# Patient Record
Sex: MALE | Race: BLACK OR AFRICAN AMERICAN | NOT HISPANIC OR LATINO | URBAN - METROPOLITAN AREA
[De-identification: names, ages, dates, MRNs, and addresses within clinical notes are randomized per-mention and may not be internally consistent; named-entity substitution may affect disease eponyms.]

---

## 2023-01-01 ENCOUNTER — APPOINTMENT (OUTPATIENT)
Dept: GENERAL RADIOLOGY | Facility: HOSPITAL | Age: 53
DRG: 917 | End: 2023-01-01
Payer: MEDICAID

## 2023-01-01 ENCOUNTER — APPOINTMENT (OUTPATIENT)
Dept: CARDIOLOGY | Facility: HOSPITAL | Age: 53
DRG: 917 | End: 2023-01-01
Payer: MEDICAID

## 2023-01-01 ENCOUNTER — APPOINTMENT (OUTPATIENT)
Dept: NUCLEAR MEDICINE | Facility: HOSPITAL | Age: 53
DRG: 917 | End: 2023-01-01
Payer: MEDICAID

## 2023-01-01 ENCOUNTER — APPOINTMENT (OUTPATIENT)
Dept: MRI IMAGING | Facility: HOSPITAL | Age: 53
DRG: 917 | End: 2023-01-01
Payer: MEDICAID

## 2023-01-01 ENCOUNTER — APPOINTMENT (OUTPATIENT)
Dept: ULTRASOUND IMAGING | Facility: HOSPITAL | Age: 53
DRG: 917 | End: 2023-01-01
Payer: MEDICAID

## 2023-01-01 ENCOUNTER — ANESTHESIA EVENT (OUTPATIENT)
Dept: PERIOP | Facility: HOSPITAL | Age: 53
DRG: 917 | End: 2023-01-01
Payer: MEDICAID

## 2023-01-01 ENCOUNTER — APPOINTMENT (OUTPATIENT)
Dept: CT IMAGING | Facility: HOSPITAL | Age: 53
DRG: 917 | End: 2023-01-01
Payer: MEDICAID

## 2023-01-01 ENCOUNTER — APPOINTMENT (OUTPATIENT)
Dept: INTERVENTIONAL RADIOLOGY/VASCULAR | Facility: HOSPITAL | Age: 53
DRG: 917 | End: 2023-01-01
Payer: MEDICAID

## 2023-01-01 ENCOUNTER — HOSPITAL ENCOUNTER (INPATIENT)
Facility: HOSPITAL | Age: 53
DRG: 917 | End: 2023-01-01
Attending: EMERGENCY MEDICINE | Admitting: INTERNAL MEDICINE
Payer: MEDICAID

## 2023-01-01 ENCOUNTER — ANESTHESIA (OUTPATIENT)
Dept: PERIOP | Facility: HOSPITAL | Age: 53
DRG: 917 | End: 2023-01-01
Payer: MEDICAID

## 2023-01-01 DIAGNOSIS — G93.1 ANOXIC BRAIN INJURY: ICD-10-CM

## 2023-01-01 DIAGNOSIS — Z86.74 HISTORY OF SUDDEN CARDIAC ARREST SUCCESSFULLY RESUSCITATED: Primary | ICD-10-CM

## 2023-01-01 DIAGNOSIS — I46.9 CARDIAC ARREST WITH SUCCESSFUL RESUSCITATION: ICD-10-CM

## 2023-01-01 DIAGNOSIS — T40.411A ACCIDENTAL FENTANYL OVERDOSE, INITIAL ENCOUNTER: ICD-10-CM

## 2023-01-01 LAB
ABO GROUP BLD: NORMAL
ABO GROUP BLD: NORMAL
ACETONE SERPL-MCNC: <.01 G/DL (ref 0–0.01)
ALBUMIN SERPL-MCNC: 1.9 G/DL (ref 3.5–5.2)
ALBUMIN SERPL-MCNC: 2.3 G/DL (ref 3.5–5.2)
ALBUMIN SERPL-MCNC: 2.4 G/DL (ref 3.5–5.2)
ALBUMIN SERPL-MCNC: 2.5 G/DL (ref 3.5–5.2)
ALBUMIN SERPL-MCNC: 2.5 G/DL (ref 3.5–5.2)
ALBUMIN SERPL-MCNC: 2.6 G/DL (ref 3.5–5.2)
ALBUMIN SERPL-MCNC: 2.6 G/DL (ref 3.5–5.2)
ALBUMIN SERPL-MCNC: 2.8 G/DL (ref 3.5–5.2)
ALBUMIN SERPL-MCNC: 2.8 G/DL (ref 3.5–5.2)
ALBUMIN SERPL-MCNC: 2.9 G/DL (ref 3.5–5.2)
ALBUMIN SERPL-MCNC: 2.9 G/DL (ref 3.5–5.2)
ALBUMIN SERPL-MCNC: 3.1 G/DL (ref 3.5–5.2)
ALBUMIN SERPL-MCNC: 3.5 G/DL (ref 3.5–5.2)
ALBUMIN SERPL-MCNC: 3.9 G/DL (ref 3.5–5.2)
ALBUMIN/GLOB SERPL: 0.7 G/DL
ALBUMIN/GLOB SERPL: 0.9 G/DL
ALBUMIN/GLOB SERPL: 1 G/DL
ALBUMIN/GLOB SERPL: 1 G/DL
ALBUMIN/GLOB SERPL: 1.1 G/DL
ALBUMIN/GLOB SERPL: 1.2 G/DL
ALBUMIN/GLOB SERPL: 1.2 G/DL
ALBUMIN/GLOB SERPL: 1.6 G/DL
ALBUMIN/GLOB SERPL: 1.8 G/DL
ALP SERPL-CCNC: 100 U/L (ref 39–117)
ALP SERPL-CCNC: 108 U/L (ref 39–117)
ALP SERPL-CCNC: 116 U/L (ref 39–117)
ALP SERPL-CCNC: 154 U/L (ref 39–117)
ALP SERPL-CCNC: 43 U/L (ref 39–117)
ALP SERPL-CCNC: 45 U/L (ref 39–117)
ALP SERPL-CCNC: 65 U/L (ref 39–117)
ALP SERPL-CCNC: 68 U/L (ref 39–117)
ALP SERPL-CCNC: 79 U/L (ref 39–117)
ALT SERPL W P-5'-P-CCNC: 115 U/L (ref 1–41)
ALT SERPL W P-5'-P-CCNC: 163 U/L (ref 1–41)
ALT SERPL W P-5'-P-CCNC: 198 U/L (ref 1–41)
ALT SERPL W P-5'-P-CCNC: 290 U/L (ref 1–41)
ALT SERPL W P-5'-P-CCNC: 385 U/L (ref 1–41)
ALT SERPL W P-5'-P-CCNC: 511 U/L (ref 1–41)
ALT SERPL W P-5'-P-CCNC: 72 U/L (ref 1–41)
ALT SERPL W P-5'-P-CCNC: 89 U/L (ref 1–41)
ALT SERPL W P-5'-P-CCNC: 93 U/L (ref 1–41)
AMPHET+METHAMPHET UR QL: NEGATIVE
ANION GAP SERPL CALCULATED.3IONS-SCNC: 10 MMOL/L (ref 5–15)
ANION GAP SERPL CALCULATED.3IONS-SCNC: 12 MMOL/L (ref 5–15)
ANION GAP SERPL CALCULATED.3IONS-SCNC: 14 MMOL/L (ref 5–15)
ANION GAP SERPL CALCULATED.3IONS-SCNC: 16 MMOL/L (ref 5–15)
ANION GAP SERPL CALCULATED.3IONS-SCNC: 16 MMOL/L (ref 5–15)
ANION GAP SERPL CALCULATED.3IONS-SCNC: 17 MMOL/L (ref 5–15)
ANION GAP SERPL CALCULATED.3IONS-SCNC: 19 MMOL/L (ref 10–20)
ANION GAP SERPL CALCULATED.3IONS-SCNC: 19 MMOL/L (ref 5–15)
ANION GAP SERPL CALCULATED.3IONS-SCNC: 8 MMOL/L (ref 5–15)
ANION GAP SERPL CALCULATED.3IONS-SCNC: 8 MMOL/L (ref 5–15)
ANION GAP SERPL CALCULATED.3IONS-SCNC: 9 MMOL/L (ref 5–15)
APAP SERPL-MCNC: <5 MCG/ML (ref 0–30)
APTT PPP: 25.6 SECONDS (ref 24–31)
APTT PPP: 39.3 SECONDS (ref 24–31)
ARTERIAL PATENCY WRIST A: ABNORMAL
ARTERIAL PATENCY WRIST A: POSITIVE
ARTERIAL PATENCY WRIST A: POSITIVE
AST SERPL-CCNC: 106 U/L (ref 1–40)
AST SERPL-CCNC: 1287 U/L (ref 1–40)
AST SERPL-CCNC: 154 U/L (ref 1–40)
AST SERPL-CCNC: 223 U/L (ref 1–40)
AST SERPL-CCNC: 380 U/L (ref 1–40)
AST SERPL-CCNC: 433 U/L (ref 1–40)
AST SERPL-CCNC: 62 U/L (ref 1–40)
AST SERPL-CCNC: 632 U/L (ref 1–40)
AST SERPL-CCNC: 85 U/L (ref 1–40)
ATMOSPHERIC PRESS: ABNORMAL MM[HG]
B PARAPERT DNA SPEC QL NAA+PROBE: NOT DETECTED
B PERT DNA SPEC QL NAA+PROBE: NOT DETECTED
BACTERIA BLD CULT: ABNORMAL
BACTERIA SPEC AEROBE CULT: ABNORMAL
BACTERIA SPEC AEROBE CULT: NORMAL
BACTERIA SPEC RESP CULT: NORMAL
BACTERIA UR QL AUTO: ABNORMAL /HPF
BARBITURATES UR QL SCN: NEGATIVE
BASE EXCESS BLDA CALC-SCNC: -10.7 MMOL/L (ref 0–3)
BASE EXCESS BLDA CALC-SCNC: -14.4 MMOL/L (ref 0–3)
BASE EXCESS BLDA CALC-SCNC: -15.4 MMOL/L (ref 0–3)
BASE EXCESS BLDA CALC-SCNC: -16.9 MMOL/L (ref 0–3)
BASE EXCESS BLDA CALC-SCNC: -17.8 MMOL/L (ref 0–3)
BASE EXCESS BLDA CALC-SCNC: -18.8 MMOL/L (ref 0–3)
BASE EXCESS BLDA CALC-SCNC: -3.9 MMOL/L (ref 0–3)
BASE EXCESS BLDA CALC-SCNC: -6.6 MMOL/L (ref 0–3)
BASE EXCESS BLDA CALC-SCNC: 0.3 MMOL/L (ref 0–3)
BASOPHILS # BLD AUTO: 0 10*3/MM3 (ref 0–0.2)
BASOPHILS # BLD MANUAL: 0.08 10*3/MM3 (ref 0–0.2)
BASOPHILS NFR BLD AUTO: 0.5 % (ref 0–1.5)
BASOPHILS NFR BLD MANUAL: 1 % (ref 0–1.5)
BDY SITE: ABNORMAL
BENZODIAZ UR QL SCN: NEGATIVE
BH BB BLOOD EXPIRATION DATE: NORMAL
BH BB BLOOD TYPE BARCODE: 6200
BH BB BLOOD TYPE BARCODE: 6200
BH BB BLOOD TYPE BARCODE: NORMAL
BH BB DISPENSE STATUS: NORMAL
BH BB PRODUCT CODE: NORMAL
BH BB UNIT NUMBER: NORMAL
BH CV ECHO MEAS - ACS: 1.7 CM
BH CV ECHO MEAS - AO MAX PG: 4.2 MMHG
BH CV ECHO MEAS - AO MEAN PG: 2 MMHG
BH CV ECHO MEAS - AO ROOT DIAM: 3.4 CM
BH CV ECHO MEAS - AO V2 MAX: 102 CM/SEC
BH CV ECHO MEAS - AO V2 VTI: 11.3 CM
BH CV ECHO MEAS - AVA(I,D): 2.7 CM2
BH CV ECHO MEAS - EDV(CUBED): 64 ML
BH CV ECHO MEAS - EDV(MOD-SP2): 90.1 ML
BH CV ECHO MEAS - EDV(MOD-SP4): 62.1 ML
BH CV ECHO MEAS - EDV(MOD-SP4): 83.7 ML
BH CV ECHO MEAS - EF(MOD-BP): 23 %
BH CV ECHO MEAS - EF(MOD-BP): 49.9 %
BH CV ECHO MEAS - EF(MOD-SP2): 51.2 %
BH CV ECHO MEAS - EF(MOD-SP4): 22.5 %
BH CV ECHO MEAS - EF(MOD-SP4): 51.4 %
BH CV ECHO MEAS - ESV(MOD-SP2): 44 ML
BH CV ECHO MEAS - ESV(MOD-SP4): 40.7 ML
BH CV ECHO MEAS - ESV(MOD-SP4): 48.1 ML
BH CV ECHO MEAS - FS: 15 %
BH CV ECHO MEAS - IVS/LVPW: 0.88 CM
BH CV ECHO MEAS - IVSD: 0.7 CM
BH CV ECHO MEAS - LA DIMENSION: 1.7 CM
BH CV ECHO MEAS - LAT PEAK E' VEL: 8.9 CM/SEC
BH CV ECHO MEAS - LV DIASTOLIC VOL/BSA (35-75): 32.6 CM2
BH CV ECHO MEAS - LV MASS(C)D: 85.8 GRAMS
BH CV ECHO MEAS - LV MAX PG: 1.89 MMHG
BH CV ECHO MEAS - LV MEAN PG: 1 MMHG
BH CV ECHO MEAS - LV SYSTOLIC VOL/BSA (12-30): 25.3 CM2
BH CV ECHO MEAS - LV V1 MAX: 68.7 CM/SEC
BH CV ECHO MEAS - LV V1 VTI: 9.6 CM
BH CV ECHO MEAS - LVIDD: 4 CM
BH CV ECHO MEAS - LVIDS: 3.4 CM
BH CV ECHO MEAS - LVOT AREA: 3.1 CM2
BH CV ECHO MEAS - LVOT DIAM: 2 CM
BH CV ECHO MEAS - LVPWD: 0.8 CM
BH CV ECHO MEAS - MED PEAK E' VEL: 6.7 CM/SEC
BH CV ECHO MEAS - MR MAX PG: 7 MMHG
BH CV ECHO MEAS - MR MAX VEL: 132 CM/SEC
BH CV ECHO MEAS - MV A DUR: 0.1 SEC
BH CV ECHO MEAS - MV A MAX VEL: 39.3 CM/SEC
BH CV ECHO MEAS - MV DEC SLOPE: 475 CM/SEC2
BH CV ECHO MEAS - MV DEC TIME: 0.09 SEC
BH CV ECHO MEAS - MV E MAX VEL: 44.5 CM/SEC
BH CV ECHO MEAS - MV E/A: 1.13
BH CV ECHO MEAS - MV MAX PG: 1.07 MMHG
BH CV ECHO MEAS - MV MEAN PG: 0 MMHG
BH CV ECHO MEAS - MV P1/2T: 24.7 MSEC
BH CV ECHO MEAS - MV V2 VTI: 8.9 CM
BH CV ECHO MEAS - MVA(P1/2T): 8.9 CM2
BH CV ECHO MEAS - MVA(VTI): 3.4 CM2
BH CV ECHO MEAS - PA V2 MAX: 72.1 CM/SEC
BH CV ECHO MEAS - PULM A REVS DUR: 0.09 SEC
BH CV ECHO MEAS - PULM A REVS VEL: 27 CM/SEC
BH CV ECHO MEAS - PULM DIAS VEL: 27.9 CM/SEC
BH CV ECHO MEAS - PULM S/D: 1.1
BH CV ECHO MEAS - PULM SYS VEL: 30.7 CM/SEC
BH CV ECHO MEAS - RAP SYSTOLE: 3 MMHG
BH CV ECHO MEAS - RVSP: 20 MMHG
BH CV ECHO MEAS - SI(MOD-SP4): 7.4 ML/M2
BH CV ECHO MEAS - SV(LVOT): 30 ML
BH CV ECHO MEAS - SV(MOD-SP2): 46.1 ML
BH CV ECHO MEAS - SV(MOD-SP4): 14 ML
BH CV ECHO MEAS - SV(MOD-SP4): 43 ML
BH CV ECHO MEAS - TAPSE (>1.6): 1.19 CM
BH CV ECHO MEAS - TR MAX PG: 17 MMHG
BH CV ECHO MEAS - TR MAX VEL: 206 CM/SEC
BH CV ECHO MEASUREMENTS AVERAGE E/E' RATIO: 5.71
BH CV UPPER ARTERIAL LEFT WBI RATIO: 1.66
BH CV UPPER ARTERIAL RIGHT WBI RATIO: 1.61
BH CV UPPER DUPLEX RIGHT AXILLARY ARTERY PSV: 47 CM/S
BH CV UPPER DUPLEX RIGHT BRACHIAL ART PSV: 78 CM/S
BH CV UPPER DUPLEX RIGHT RADIAL ART PSV: 0 CM/S
BH CV UPPER DUPLEX RIGHT SUBCLAVIAN ART PSV: 40 CM/S
BH CV UPPER DUPLEX RIGHT ULNAR ART PSV: 55 CM/S
BH CV VAS PRELIMINARY FINDINGS SCRIPTING: 1
BH CV XLRA - RV BASE: 2.3 CM
BH CV XLRA - RV LENGTH: 5.7 CM
BH CV XLRA - RV MID: 1.9 CM
BH CV XLRA - TDI S': 8.8 CM/SEC
BILIRUB SERPL-MCNC: 0.2 MG/DL (ref 0–1.2)
BILIRUB SERPL-MCNC: 0.4 MG/DL (ref 0–1.2)
BILIRUB SERPL-MCNC: 0.5 MG/DL (ref 0–1.2)
BILIRUB SERPL-MCNC: 0.6 MG/DL (ref 0–1.2)
BILIRUB SERPL-MCNC: 0.6 MG/DL (ref 0–1.2)
BILIRUB SERPL-MCNC: 0.7 MG/DL (ref 0–1.2)
BILIRUB SERPL-MCNC: 1 MG/DL (ref 0–1.2)
BILIRUB UR QL STRIP: NEGATIVE
BLD GP AB SCN SERPL QL: NEGATIVE
BOTTLE TYPE: ABNORMAL
BUN BLDA-MCNC: 11 MG/DL (ref 8–26)
BUN SERPL-MCNC: 11 MG/DL (ref 6–20)
BUN SERPL-MCNC: 13 MG/DL (ref 6–20)
BUN SERPL-MCNC: 14 MG/DL (ref 6–20)
BUN SERPL-MCNC: 14 MG/DL (ref 6–20)
BUN SERPL-MCNC: 17 MG/DL (ref 6–20)
BUN SERPL-MCNC: 18 MG/DL (ref 6–20)
BUN SERPL-MCNC: 18 MG/DL (ref 6–20)
BUN SERPL-MCNC: 20 MG/DL (ref 6–20)
BUN SERPL-MCNC: 24 MG/DL (ref 6–20)
BUN SERPL-MCNC: 26 MG/DL (ref 6–20)
BUN SERPL-MCNC: 27 MG/DL (ref 6–20)
BUN SERPL-MCNC: 31 MG/DL (ref 6–20)
BUN SERPL-MCNC: 34 MG/DL (ref 6–20)
BUN SERPL-MCNC: 34 MG/DL (ref 6–20)
BUN SERPL-MCNC: 37 MG/DL (ref 6–20)
BUN SERPL-MCNC: 40 MG/DL (ref 6–20)
BUN SERPL-MCNC: 50 MG/DL (ref 6–20)
BUN/CREAT SERPL: 6.3 (ref 7–25)
BUN/CREAT SERPL: 6.5 (ref 7–25)
BUN/CREAT SERPL: 6.5 (ref 7–25)
BUN/CREAT SERPL: 7.2 (ref 7–25)
BUN/CREAT SERPL: 7.2 (ref 7–25)
BUN/CREAT SERPL: 7.3 (ref 7–25)
BUN/CREAT SERPL: 7.4 (ref 7–25)
BUN/CREAT SERPL: 7.4 (ref 7–25)
BUN/CREAT SERPL: 7.5 (ref 7–25)
BUN/CREAT SERPL: 7.9 (ref 7–25)
BUN/CREAT SERPL: 8.1 (ref 7–25)
BUN/CREAT SERPL: 8.3 (ref 7–25)
BUN/CREAT SERPL: 8.4 (ref 7–25)
BUN/CREAT SERPL: 8.9 (ref 7–25)
BUN/CREAT SERPL: 9.2 (ref 7–25)
C DIFF GDH + TOXINS A+B STL QL IA.RAPID: NEGATIVE
C DIFF GDH + TOXINS A+B STL QL IA.RAPID: NEGATIVE
C PNEUM DNA NPH QL NAA+NON-PROBE: NOT DETECTED
CA-I BLDA-SCNC: 1.06 MMOL/L (ref 1.15–1.33)
CA-I BLDA-SCNC: 1.14 MMOL/L (ref 1.12–1.32)
CA-I BLDA-SCNC: 1.18 MMOL/L (ref 1.15–1.33)
CA-I SERPL ISE-MCNC: 0.94 MMOL/L (ref 1.2–1.3)
CA-I SERPL ISE-MCNC: 0.94 MMOL/L (ref 1.2–1.3)
CA-I SERPL ISE-MCNC: 1.03 MMOL/L (ref 1.2–1.3)
CA-I SERPL ISE-MCNC: 1.11 MMOL/L (ref 1.2–1.3)
CA-I SERPL ISE-MCNC: 1.12 MMOL/L (ref 1.2–1.3)
CA-I SERPL ISE-MCNC: 1.15 MMOL/L (ref 1.2–1.3)
CA-I SERPL ISE-MCNC: 1.17 MMOL/L (ref 1.2–1.3)
CA-I SERPL ISE-MCNC: 1.17 MMOL/L (ref 1.2–1.3)
CA-I SERPL ISE-MCNC: 1.18 MMOL/L (ref 1.2–1.3)
CA-I SERPL ISE-MCNC: 1.18 MMOL/L (ref 1.2–1.3)
CA-I SERPL ISE-MCNC: 1.25 MMOL/L (ref 1.2–1.3)
CALCIUM SPEC-SCNC: 6.9 MG/DL (ref 8.6–10.5)
CALCIUM SPEC-SCNC: 7.2 MG/DL (ref 8.6–10.5)
CALCIUM SPEC-SCNC: 7.3 MG/DL (ref 8.6–10.5)
CALCIUM SPEC-SCNC: 7.7 MG/DL (ref 8.6–10.5)
CALCIUM SPEC-SCNC: 7.9 MG/DL (ref 8.6–10.5)
CALCIUM SPEC-SCNC: 8.2 MG/DL (ref 8.6–10.5)
CALCIUM SPEC-SCNC: 8.3 MG/DL (ref 8.6–10.5)
CALCIUM SPEC-SCNC: 8.4 MG/DL (ref 8.6–10.5)
CALCIUM SPEC-SCNC: 8.5 MG/DL (ref 8.6–10.5)
CALCIUM SPEC-SCNC: 8.7 MG/DL (ref 8.6–10.5)
CALCIUM SPEC-SCNC: 9 MG/DL (ref 8.6–10.5)
CALCIUM SPEC-SCNC: 9.1 MG/DL (ref 8.6–10.5)
CALCIUM SPEC-SCNC: 9.3 MG/DL (ref 8.6–10.5)
CANNABINOIDS SERPL QL: NEGATIVE
CHLORIDE BLDA-SCNC: 101 MMOL/L (ref 98–109)
CHLORIDE SERPL-SCNC: 101 MMOL/L (ref 98–107)
CHLORIDE SERPL-SCNC: 102 MMOL/L (ref 98–107)
CHLORIDE SERPL-SCNC: 103 MMOL/L (ref 98–107)
CHLORIDE SERPL-SCNC: 103 MMOL/L (ref 98–107)
CHLORIDE SERPL-SCNC: 104 MMOL/L (ref 98–107)
CHLORIDE SERPL-SCNC: 105 MMOL/L (ref 98–107)
CHLORIDE SERPL-SCNC: 106 MMOL/L (ref 98–107)
CHLORIDE SERPL-SCNC: 106 MMOL/L (ref 98–107)
CHLORIDE SERPL-SCNC: 107 MMOL/L (ref 98–107)
CHLORIDE SERPL-SCNC: 109 MMOL/L (ref 98–107)
CHLORIDE SERPL-SCNC: 112 MMOL/L (ref 98–107)
CHLORIDE SERPL-SCNC: 113 MMOL/L (ref 98–107)
CHLORIDE SERPL-SCNC: 114 MMOL/L (ref 98–107)
CHOLEST SERPL-MCNC: 192 MG/DL (ref 0–200)
CK SERPL-CCNC: 2950 U/L (ref 20–200)
CLARITY UR: CLEAR
CO2 BLDA-SCNC: 16.8 MMOL/L (ref 22–29)
CO2 BLDA-SCNC: 18.4 MMOL/L (ref 22–29)
CO2 BLDA-SCNC: 19.3 MMOL/L (ref 22–29)
CO2 BLDA-SCNC: 21 MMOL/L (ref 22–29)
CO2 BLDA-SCNC: 21.3 MMOL/L (ref 22–29)
CO2 BLDA-SCNC: 21.7 MMOL/L (ref 22–29)
CO2 BLDA-SCNC: 25 MMOL/L (ref 24–29)
CO2 BLDA-SCNC: 25.6 MMOL/L (ref 22–29)
CO2 SERPL-SCNC: 16 MMOL/L (ref 22–29)
CO2 SERPL-SCNC: 17 MMOL/L (ref 22–29)
CO2 SERPL-SCNC: 20 MMOL/L (ref 22–29)
CO2 SERPL-SCNC: 21 MMOL/L (ref 22–29)
CO2 SERPL-SCNC: 22 MMOL/L (ref 22–29)
CO2 SERPL-SCNC: 23 MMOL/L (ref 22–29)
CO2 SERPL-SCNC: 24 MMOL/L (ref 22–29)
CO2 SERPL-SCNC: 25 MMOL/L (ref 22–29)
CO2 SERPL-SCNC: 25 MMOL/L (ref 22–29)
CO2 SERPL-SCNC: 26 MMOL/L (ref 22–29)
CO2 SERPL-SCNC: 27 MMOL/L (ref 22–29)
COCAINE UR QL: NEGATIVE
COLOR UR: YELLOW
CREAT BLDA-MCNC: 1.07 MG/DL
CREAT BLDA-MCNC: 1.35 MG/DL
CREAT BLDA-MCNC: 1.6 MG/DL (ref 0.6–1.3)
CREAT SERPL-MCNC: 1.33 MG/DL (ref 0.76–1.27)
CREAT SERPL-MCNC: 1.77 MG/DL (ref 0.76–1.27)
CREAT SERPL-MCNC: 1.94 MG/DL (ref 0.76–1.27)
CREAT SERPL-MCNC: 2.15 MG/DL (ref 0.76–1.27)
CREAT SERPL-MCNC: 2.28 MG/DL (ref 0.76–1.27)
CREAT SERPL-MCNC: 2.41 MG/DL (ref 0.76–1.27)
CREAT SERPL-MCNC: 2.7 MG/DL (ref 0.76–1.27)
CREAT SERPL-MCNC: 2.71 MG/DL (ref 0.76–1.27)
CREAT SERPL-MCNC: 3.03 MG/DL (ref 0.76–1.27)
CREAT SERPL-MCNC: 3.23 MG/DL (ref 0.76–1.27)
CREAT SERPL-MCNC: 3.61 MG/DL (ref 0.76–1.27)
CREAT SERPL-MCNC: 3.68 MG/DL (ref 0.76–1.27)
CREAT SERPL-MCNC: 4.41 MG/DL (ref 0.76–1.27)
CREAT SERPL-MCNC: 4.9 MG/DL (ref 0.76–1.27)
CREAT SERPL-MCNC: 5.39 MG/DL (ref 0.76–1.27)
CREAT SERPL-MCNC: 6.17 MG/DL (ref 0.76–1.27)
CREAT SERPL-MCNC: 6.19 MG/DL (ref 0.76–1.27)
D DIMER PPP FEU-MCNC: 35.1 MG/L (FEU) (ref 0–0.53)
D-LACTATE SERPL-SCNC: 1.8 MMOL/L (ref 0.5–2)
D-LACTATE SERPL-SCNC: 6.7 MMOL/L (ref 0.5–2)
D-LACTATE SERPL-SCNC: 6.9 MMOL/L (ref 0.2–2)
D-LACTATE SERPL-SCNC: 7.1 MMOL/L (ref 0.2–2)
D-LACTATE SERPL-SCNC: 7.7 MMOL/L (ref 0.2–2)
D-LACTATE SERPL-SCNC: 7.7 MMOL/L (ref 0.5–2)
DEPRECATED RDW RBC AUTO: 42.9 FL (ref 37–54)
DEPRECATED RDW RBC AUTO: 43.8 FL (ref 37–54)
DEPRECATED RDW RBC AUTO: 44.6 FL (ref 37–54)
DEPRECATED RDW RBC AUTO: 45.1 FL (ref 37–54)
DEPRECATED RDW RBC AUTO: 45.5 FL (ref 37–54)
DEPRECATED RDW RBC AUTO: 45.9 FL (ref 37–54)
DEPRECATED RDW RBC AUTO: 46.4 FL (ref 37–54)
DEPRECATED RDW RBC AUTO: 46.8 FL (ref 37–54)
DEPRECATED RDW RBC AUTO: 47.3 FL (ref 37–54)
DOHLE BOD BLD QL SMEAR: PRESENT
EGFRCR SERPLBLD CKD-EPI 2021: 10.1 ML/MIN/1.73
EGFRCR SERPLBLD CKD-EPI 2021: 10.1 ML/MIN/1.73
EGFRCR SERPLBLD CKD-EPI 2021: 11.9 ML/MIN/1.73
EGFRCR SERPLBLD CKD-EPI 2021: 13.4 ML/MIN/1.73
EGFRCR SERPLBLD CKD-EPI 2021: 15.2 ML/MIN/1.73
EGFRCR SERPLBLD CKD-EPI 2021: 18.8 ML/MIN/1.73
EGFRCR SERPLBLD CKD-EPI 2021: 19.3 ML/MIN/1.73
EGFRCR SERPLBLD CKD-EPI 2021: 22 ML/MIN/1.73
EGFRCR SERPLBLD CKD-EPI 2021: 23.8 ML/MIN/1.73
EGFRCR SERPLBLD CKD-EPI 2021: 27.2 ML/MIN/1.73
EGFRCR SERPLBLD CKD-EPI 2021: 27.3 ML/MIN/1.73
EGFRCR SERPLBLD CKD-EPI 2021: 31.3 ML/MIN/1.73
EGFRCR SERPLBLD CKD-EPI 2021: 33.5 ML/MIN/1.73
EGFRCR SERPLBLD CKD-EPI 2021: 35.9 ML/MIN/1.73
EGFRCR SERPLBLD CKD-EPI 2021: 40.6 ML/MIN/1.73
EGFRCR SERPLBLD CKD-EPI 2021: 45.4 ML/MIN/1.73
EGFRCR SERPLBLD CKD-EPI 2021: 51.2 ML/MIN/1.73
EGFRCR SERPLBLD CKD-EPI 2021: 62.8 ML/MIN/1.73
EGFRCR SERPLBLD CKD-EPI 2021: 63.9 ML/MIN/1.73
EGFRCR SERPLBLD CKD-EPI 2021: 83 ML/MIN/1.73
EOSINOPHIL # BLD AUTO: 0 10*3/MM3 (ref 0–0.4)
EOSINOPHIL # BLD MANUAL: 0.06 10*3/MM3 (ref 0–0.4)
EOSINOPHIL # BLD MANUAL: 0.3 10*3/MM3 (ref 0–0.4)
EOSINOPHIL # BLD MANUAL: 0.47 10*3/MM3 (ref 0–0.4)
EOSINOPHIL # BLD MANUAL: 0.55 10*3/MM3 (ref 0–0.4)
EOSINOPHIL NFR BLD AUTO: 0.5 % (ref 0.3–6.2)
EOSINOPHIL NFR BLD MANUAL: 1 % (ref 0.3–6.2)
EOSINOPHIL NFR BLD MANUAL: 2 % (ref 0.3–6.2)
EOSINOPHIL NFR BLD MANUAL: 3 % (ref 0.3–6.2)
EOSINOPHIL NFR BLD MANUAL: 4 % (ref 0.3–6.2)
ERYTHROCYTE [DISTWIDTH] IN BLOOD BY AUTOMATED COUNT: 12.9 % (ref 12.3–15.4)
ERYTHROCYTE [DISTWIDTH] IN BLOOD BY AUTOMATED COUNT: 13 % (ref 12.3–15.4)
ERYTHROCYTE [DISTWIDTH] IN BLOOD BY AUTOMATED COUNT: 13.1 % (ref 12.3–15.4)
ERYTHROCYTE [DISTWIDTH] IN BLOOD BY AUTOMATED COUNT: 13.2 % (ref 12.3–15.4)
ERYTHROCYTE [DISTWIDTH] IN BLOOD BY AUTOMATED COUNT: 13.2 % (ref 12.3–15.4)
ERYTHROCYTE [DISTWIDTH] IN BLOOD BY AUTOMATED COUNT: 13.3 % (ref 12.3–15.4)
ERYTHROCYTE [DISTWIDTH] IN BLOOD BY AUTOMATED COUNT: 13.4 % (ref 12.3–15.4)
ETHANOL BLD GC-MCNC: <.01 G/DL (ref 0–0.01)
ETHANOL UR QL: 0.26 %
FIBRINOGEN PPP-MCNC: 154 MG/DL (ref 210–450)
FIBRINOGEN PPP-MCNC: 361 MG/DL (ref 210–450)
FIBRINOGEN PPP-MCNC: 451 MG/DL (ref 210–450)
FLUAV SUBTYP SPEC NAA+PROBE: NOT DETECTED
FLUAV SUBTYP SPEC NAA+PROBE: NOT DETECTED
FLUBV RNA ISLT QL NAA+PROBE: NOT DETECTED
FLUBV RNA ISLT QL NAA+PROBE: NOT DETECTED
FSP PPP-MCNC: 40 UG/ML
GEN 5 2HR TROPONIN T REFLEX: 298 NG/L
GIANT PLATELETS: ABNORMAL
GLOBULIN UR ELPH-MCNC: 2 GM/DL
GLOBULIN UR ELPH-MCNC: 2.3 GM/DL
GLOBULIN UR ELPH-MCNC: 2.3 GM/DL
GLOBULIN UR ELPH-MCNC: 2.4 GM/DL
GLOBULIN UR ELPH-MCNC: 2.5 GM/DL
GLOBULIN UR ELPH-MCNC: 2.5 GM/DL
GLOBULIN UR ELPH-MCNC: 2.9 GM/DL
GLUCOSE BLDC GLUCOMTR-MCNC: 102 MG/DL (ref 70–105)
GLUCOSE BLDC GLUCOMTR-MCNC: 103 MG/DL (ref 70–105)
GLUCOSE BLDC GLUCOMTR-MCNC: 107 MG/DL (ref 70–105)
GLUCOSE BLDC GLUCOMTR-MCNC: 110 MG/DL (ref 70–105)
GLUCOSE BLDC GLUCOMTR-MCNC: 111 MG/DL (ref 70–105)
GLUCOSE BLDC GLUCOMTR-MCNC: 111 MG/DL (ref 70–105)
GLUCOSE BLDC GLUCOMTR-MCNC: 112 MG/DL (ref 70–105)
GLUCOSE BLDC GLUCOMTR-MCNC: 114 MG/DL (ref 70–105)
GLUCOSE BLDC GLUCOMTR-MCNC: 115 MG/DL (ref 70–105)
GLUCOSE BLDC GLUCOMTR-MCNC: 117 MG/DL (ref 70–105)
GLUCOSE BLDC GLUCOMTR-MCNC: 117 MG/DL (ref 70–105)
GLUCOSE BLDC GLUCOMTR-MCNC: 120 MG/DL (ref 70–105)
GLUCOSE BLDC GLUCOMTR-MCNC: 120 MG/DL (ref 70–105)
GLUCOSE BLDC GLUCOMTR-MCNC: 123 MG/DL (ref 70–105)
GLUCOSE BLDC GLUCOMTR-MCNC: 124 MG/DL (ref 70–105)
GLUCOSE BLDC GLUCOMTR-MCNC: 132 MG/DL (ref 70–105)
GLUCOSE BLDC GLUCOMTR-MCNC: 139 MG/DL (ref 70–105)
GLUCOSE BLDC GLUCOMTR-MCNC: 141 MG/DL (ref 70–105)
GLUCOSE BLDC GLUCOMTR-MCNC: 146 MG/DL (ref 70–105)
GLUCOSE BLDC GLUCOMTR-MCNC: 172 MG/DL (ref 74–100)
GLUCOSE BLDC GLUCOMTR-MCNC: 198 MG/DL (ref 70–105)
GLUCOSE BLDC GLUCOMTR-MCNC: 20 MG/DL (ref 70–105)
GLUCOSE BLDC GLUCOMTR-MCNC: 209 MG/DL (ref 70–105)
GLUCOSE BLDC GLUCOMTR-MCNC: 214 MG/DL (ref 70–105)
GLUCOSE BLDC GLUCOMTR-MCNC: 243 MG/DL (ref 74–100)
GLUCOSE BLDC GLUCOMTR-MCNC: 243 MG/DL (ref 74–100)
GLUCOSE BLDC GLUCOMTR-MCNC: 254 MG/DL (ref 70–105)
GLUCOSE BLDC GLUCOMTR-MCNC: 46 MG/DL (ref 70–105)
GLUCOSE BLDC GLUCOMTR-MCNC: 48 MG/DL (ref 70–105)
GLUCOSE BLDC GLUCOMTR-MCNC: 49 MG/DL (ref 70–105)
GLUCOSE BLDC GLUCOMTR-MCNC: 59 MG/DL (ref 70–105)
GLUCOSE BLDC GLUCOMTR-MCNC: 67 MG/DL (ref 70–105)
GLUCOSE BLDC GLUCOMTR-MCNC: 73 MG/DL (ref 70–105)
GLUCOSE BLDC GLUCOMTR-MCNC: 78 MG/DL (ref 70–105)
GLUCOSE BLDC GLUCOMTR-MCNC: 80 MG/DL (ref 70–105)
GLUCOSE BLDC GLUCOMTR-MCNC: 83 MG/DL (ref 70–105)
GLUCOSE BLDC GLUCOMTR-MCNC: 84 MG/DL (ref 70–105)
GLUCOSE BLDC GLUCOMTR-MCNC: 89 MG/DL (ref 70–105)
GLUCOSE BLDC GLUCOMTR-MCNC: 90 MG/DL (ref 70–105)
GLUCOSE BLDC GLUCOMTR-MCNC: 95 MG/DL (ref 70–105)
GLUCOSE BLDC GLUCOMTR-MCNC: 96 MG/DL (ref 74–100)
GLUCOSE BLDC GLUCOMTR-MCNC: 96 MG/DL (ref 74–100)
GLUCOSE BLDC GLUCOMTR-MCNC: 99 MG/DL (ref 70–105)
GLUCOSE SERPL-MCNC: 101 MG/DL (ref 65–99)
GLUCOSE SERPL-MCNC: 107 MG/DL (ref 65–99)
GLUCOSE SERPL-MCNC: 110 MG/DL (ref 65–99)
GLUCOSE SERPL-MCNC: 111 MG/DL (ref 65–99)
GLUCOSE SERPL-MCNC: 117 MG/DL (ref 65–99)
GLUCOSE SERPL-MCNC: 120 MG/DL (ref 65–99)
GLUCOSE SERPL-MCNC: 127 MG/DL (ref 65–99)
GLUCOSE SERPL-MCNC: 128 MG/DL (ref 65–99)
GLUCOSE SERPL-MCNC: 142 MG/DL (ref 65–99)
GLUCOSE SERPL-MCNC: 207 MG/DL (ref 65–99)
GLUCOSE SERPL-MCNC: 260 MG/DL (ref 65–99)
GLUCOSE SERPL-MCNC: 266 MG/DL (ref 65–99)
GLUCOSE SERPL-MCNC: 55 MG/DL (ref 65–99)
GLUCOSE SERPL-MCNC: 88 MG/DL (ref 65–99)
GLUCOSE SERPL-MCNC: 91 MG/DL (ref 65–99)
GLUCOSE SERPL-MCNC: 92 MG/DL (ref 65–99)
GLUCOSE SERPL-MCNC: 97 MG/DL (ref 65–99)
GLUCOSE UR STRIP-MCNC: NEGATIVE MG/DL
GRAM STN SPEC: ABNORMAL
GRAM STN SPEC: ABNORMAL
GRAM STN SPEC: NORMAL
HADV DNA SPEC NAA+PROBE: NOT DETECTED
HBA1C MFR BLD: 5.1 % (ref 4.8–5.6)
HBV SURFACE AG SERPL QL IA: NORMAL
HCO3 BLDA-SCNC: 14.2 MMOL/L (ref 21–28)
HCO3 BLDA-SCNC: 14.7 MMOL/L (ref 21–28)
HCO3 BLDA-SCNC: 16.1 MMOL/L (ref 21–28)
HCO3 BLDA-SCNC: 16.2 MMOL/L (ref 21–28)
HCO3 BLDA-SCNC: 16.7 MMOL/L (ref 21–28)
HCO3 BLDA-SCNC: 19.3 MMOL/L (ref 21–28)
HCO3 BLDA-SCNC: 20 MMOL/L (ref 21–28)
HCO3 BLDA-SCNC: 20.6 MMOL/L (ref 21–28)
HCO3 BLDA-SCNC: 24.5 MMOL/L (ref 21–28)
HCOV 229E RNA SPEC QL NAA+PROBE: NOT DETECTED
HCOV HKU1 RNA SPEC QL NAA+PROBE: NOT DETECTED
HCOV NL63 RNA SPEC QL NAA+PROBE: NOT DETECTED
HCOV OC43 RNA SPEC QL NAA+PROBE: NOT DETECTED
HCT VFR BLD AUTO: 24.6 % (ref 37.5–51)
HCT VFR BLD AUTO: 24.6 % (ref 37.5–51)
HCT VFR BLD AUTO: 25.4 % (ref 37.5–51)
HCT VFR BLD AUTO: 26 % (ref 37.5–51)
HCT VFR BLD AUTO: 27.1 % (ref 37.5–51)
HCT VFR BLD AUTO: 27.4 % (ref 37.5–51)
HCT VFR BLD AUTO: 27.9 % (ref 37.5–51)
HCT VFR BLD AUTO: 27.9 % (ref 37.5–51)
HCT VFR BLD AUTO: 28.3 % (ref 37.5–51)
HCT VFR BLD AUTO: 31.1 % (ref 37.5–51)
HCT VFR BLD AUTO: 34.8 % (ref 37.5–51)
HCT VFR BLD AUTO: 38.6 % (ref 37.5–51)
HCT VFR BLD AUTO: 40.7 % (ref 37.5–51)
HCT VFR BLD AUTO: 41.9 % (ref 37.5–51)
HCT VFR BLD AUTO: 42.7 % (ref 37.5–51)
HCT VFR BLD AUTO: 45.3 % (ref 37.5–51)
HCT VFR BLD AUTO: 47.6 % (ref 37.5–51)
HCT VFR BLD AUTO: 47.8 % (ref 37.5–51)
HCT VFR BLDA CALC: 40 % (ref 38–51)
HCT VFR BLDA CALC: 43 % (ref 38–51)
HCT VFR BLDA CALC: 44 % (ref 38–51)
HDLC SERPL-MCNC: 60 MG/DL (ref 40–60)
HEMODILUTION: NO
HGB BLD-MCNC: 10.7 G/DL (ref 13–17.7)
HGB BLD-MCNC: 12 G/DL (ref 13–17.7)
HGB BLD-MCNC: 13.1 G/DL (ref 13–17.7)
HGB BLD-MCNC: 13.6 G/DL (ref 13–17.7)
HGB BLD-MCNC: 14 G/DL (ref 13–17.7)
HGB BLD-MCNC: 14.6 G/DL (ref 13–17.7)
HGB BLD-MCNC: 15.5 G/DL (ref 13–17.7)
HGB BLD-MCNC: 16 G/DL (ref 13–17.7)
HGB BLD-MCNC: 16.1 G/DL (ref 13–17.7)
HGB BLD-MCNC: 8.2 G/DL (ref 13–17.7)
HGB BLD-MCNC: 8.3 G/DL (ref 13–17.7)
HGB BLD-MCNC: 8.6 G/DL (ref 13–17.7)
HGB BLD-MCNC: 8.7 G/DL (ref 13–17.7)
HGB BLD-MCNC: 9.3 G/DL (ref 13–17.7)
HGB BLD-MCNC: 9.3 G/DL (ref 13–17.7)
HGB BLD-MCNC: 9.4 G/DL (ref 13–17.7)
HGB BLD-MCNC: 9.5 G/DL (ref 13–17.7)
HGB BLD-MCNC: 9.6 G/DL (ref 13–17.7)
HGB BLDA-MCNC: 13.6 G/DL (ref 12–17)
HGB BLDA-MCNC: 14.6 G/DL (ref 12–17)
HGB BLDA-MCNC: 15.1 G/DL (ref 12–17)
HGB UR QL STRIP.AUTO: ABNORMAL
HMPV RNA NPH QL NAA+NON-PROBE: NOT DETECTED
HOLD SPECIMEN: NORMAL
HPIV1 RNA ISLT QL NAA+PROBE: NOT DETECTED
HPIV2 RNA SPEC QL NAA+PROBE: NOT DETECTED
HPIV3 RNA NPH QL NAA+PROBE: NOT DETECTED
HPIV4 P GENE NPH QL NAA+PROBE: NOT DETECTED
HYALINE CASTS UR QL AUTO: ABNORMAL /LPF
INHALED O2 CONCENTRATION: 100 %
INHALED O2 CONCENTRATION: 45 %
INHALED O2 CONCENTRATION: 60 %
INHALED O2 CONCENTRATION: 70 %
INR PPP: 1.21 (ref 0.93–1.1)
INR PPP: 1.28 (ref 0.93–1.1)
INSPIRATORY TIME: 1
ISOLATED FROM: ABNORMAL
ISOPROPANOL SERPL-MCNC: <.01 G/DL (ref 0–0.01)
KETONES UR QL STRIP: NEGATIVE
LAB AP CASE REPORT: NORMAL
LARGE PLATELETS: ABNORMAL
LDLC SERPL CALC-MCNC: 116 MG/DL (ref 0–100)
LDLC/HDLC SERPL: 1.9 {RATIO}
LEUKOCYTE ESTERASE UR QL STRIP.AUTO: NEGATIVE
LV EF 2D ECHO EST: 45 %
LYMPHOCYTES # BLD AUTO: 1.2 10*3/MM3 (ref 0.7–3.1)
LYMPHOCYTES # BLD MANUAL: 0.41 10*3/MM3 (ref 0.7–3.1)
LYMPHOCYTES # BLD MANUAL: 0.7 10*3/MM3 (ref 0.7–3.1)
LYMPHOCYTES # BLD MANUAL: 0.7 10*3/MM3 (ref 0.7–3.1)
LYMPHOCYTES # BLD MANUAL: 0.76 10*3/MM3 (ref 0.7–3.1)
LYMPHOCYTES # BLD MANUAL: 0.86 10*3/MM3 (ref 0.7–3.1)
LYMPHOCYTES # BLD MANUAL: 1.41 10*3/MM3 (ref 0.7–3.1)
LYMPHOCYTES # BLD MANUAL: 1.48 10*3/MM3 (ref 0.7–3.1)
LYMPHOCYTES # BLD MANUAL: 1.65 10*3/MM3 (ref 0.7–3.1)
LYMPHOCYTES # BLD MANUAL: 4.73 10*3/MM3 (ref 0.7–3.1)
LYMPHOCYTES NFR BLD AUTO: 43.8 % (ref 19.6–45.3)
LYMPHOCYTES NFR BLD MANUAL: 2 % (ref 5–12)
LYMPHOCYTES NFR BLD MANUAL: 2 % (ref 5–12)
LYMPHOCYTES NFR BLD MANUAL: 3 % (ref 5–12)
LYMPHOCYTES NFR BLD MANUAL: 4 % (ref 5–12)
LYMPHOCYTES NFR BLD MANUAL: 4 % (ref 5–12)
LYMPHOCYTES NFR BLD MANUAL: 5 % (ref 5–12)
LYMPHOCYTES NFR BLD MANUAL: 5 % (ref 5–12)
M PNEUMO IGG SER IA-ACNC: NOT DETECTED
MAGNESIUM SERPL-MCNC: 1.3 MG/DL (ref 1.6–2.6)
MAGNESIUM SERPL-MCNC: 1.4 MG/DL (ref 1.6–2.6)
MAGNESIUM SERPL-MCNC: 1.6 MG/DL (ref 1.6–2.6)
MAGNESIUM SERPL-MCNC: 1.7 MG/DL (ref 1.6–2.6)
MAGNESIUM SERPL-MCNC: 1.8 MG/DL (ref 1.6–2.6)
MAGNESIUM SERPL-MCNC: 1.9 MG/DL (ref 1.6–2.6)
MAGNESIUM SERPL-MCNC: 2 MG/DL (ref 1.6–2.6)
MAGNESIUM SERPL-MCNC: 2.4 MG/DL (ref 1.6–2.6)
MCH RBC QN AUTO: 31.9 PG (ref 26.6–33)
MCH RBC QN AUTO: 32 PG (ref 26.6–33)
MCH RBC QN AUTO: 32 PG (ref 26.6–33)
MCH RBC QN AUTO: 32.2 PG (ref 26.6–33)
MCH RBC QN AUTO: 32.3 PG (ref 26.6–33)
MCH RBC QN AUTO: 32.3 PG (ref 26.6–33)
MCH RBC QN AUTO: 32.6 PG (ref 26.6–33)
MCH RBC QN AUTO: 32.7 PG (ref 26.6–33)
MCH RBC QN AUTO: 32.9 PG (ref 26.6–33)
MCH RBC QN AUTO: 33 PG (ref 26.6–33)
MCH RBC QN AUTO: 33.1 PG (ref 26.6–33)
MCHC RBC AUTO-ENTMCNC: 33.4 G/DL (ref 31.5–35.7)
MCHC RBC AUTO-ENTMCNC: 33.4 G/DL (ref 31.5–35.7)
MCHC RBC AUTO-ENTMCNC: 33.5 G/DL (ref 31.5–35.7)
MCHC RBC AUTO-ENTMCNC: 33.5 G/DL (ref 31.5–35.7)
MCHC RBC AUTO-ENTMCNC: 33.8 G/DL (ref 31.5–35.7)
MCHC RBC AUTO-ENTMCNC: 33.8 G/DL (ref 31.5–35.7)
MCHC RBC AUTO-ENTMCNC: 34 G/DL (ref 31.5–35.7)
MCHC RBC AUTO-ENTMCNC: 34.1 G/DL (ref 31.5–35.7)
MCHC RBC AUTO-ENTMCNC: 34.1 G/DL (ref 31.5–35.7)
MCHC RBC AUTO-ENTMCNC: 34.2 G/DL (ref 31.5–35.7)
MCHC RBC AUTO-ENTMCNC: 34.5 G/DL (ref 31.5–35.7)
MCV RBC AUTO: 93.9 FL (ref 79–97)
MCV RBC AUTO: 94.3 FL (ref 79–97)
MCV RBC AUTO: 94.4 FL (ref 79–97)
MCV RBC AUTO: 94.7 FL (ref 79–97)
MCV RBC AUTO: 95.4 FL (ref 79–97)
MCV RBC AUTO: 95.4 FL (ref 79–97)
MCV RBC AUTO: 95.6 FL (ref 79–97)
MCV RBC AUTO: 96.6 FL (ref 79–97)
MCV RBC AUTO: 97.3 FL (ref 79–97)
MCV RBC AUTO: 97.7 FL (ref 79–97)
MCV RBC AUTO: 99 FL (ref 79–97)
METAMYELOCYTES NFR BLD MANUAL: 1 % (ref 0–0)
METAMYELOCYTES NFR BLD MANUAL: 10 % (ref 0–0)
METAMYELOCYTES NFR BLD MANUAL: 2 % (ref 0–0)
METAMYELOCYTES NFR BLD MANUAL: 4 % (ref 0–0)
METAMYELOCYTES NFR BLD MANUAL: 4 % (ref 0–0)
METHADONE UR QL SCN: NEGATIVE
METHANOL SERPL-MCNC: <.01 G/DL (ref 0–0.01)
MODALITY: ABNORMAL
MONOCYTES # BLD AUTO: 0.1 10*3/MM3 (ref 0.1–0.9)
MONOCYTES # BLD: 0.13 10*3/MM3 (ref 0.1–0.9)
MONOCYTES # BLD: 0.21 10*3/MM3 (ref 0.1–0.9)
MONOCYTES # BLD: 0.3 10*3/MM3 (ref 0.1–0.9)
MONOCYTES # BLD: 0.32 10*3/MM3 (ref 0.1–0.9)
MONOCYTES # BLD: 0.55 10*3/MM3 (ref 0.1–0.9)
MONOCYTES # BLD: 0.74 10*3/MM3 (ref 0.1–0.9)
MONOCYTES # BLD: 1.01 10*3/MM3 (ref 0.1–0.9)
MONOCYTES NFR BLD AUTO: 4.1 % (ref 5–12)
MRSA DNA SPEC QL NAA+PROBE: NORMAL
MYELOCYTES NFR BLD MANUAL: 1 % (ref 0–0)
MYELOCYTES NFR BLD MANUAL: 2 % (ref 0–0)
MYELOCYTES NFR BLD MANUAL: 3 % (ref 0–0)
MYELOCYTES NFR BLD MANUAL: 3 % (ref 0–0)
MYELOCYTES NFR BLD MANUAL: 4 % (ref 0–0)
NEUTROPHILS # BLD AUTO: 12.14 10*3/MM3 (ref 1.7–7)
NEUTROPHILS # BLD AUTO: 14.82 10*3/MM3 (ref 1.7–7)
NEUTROPHILS # BLD AUTO: 16.97 10*3/MM3 (ref 1.7–7)
NEUTROPHILS # BLD AUTO: 2.4 10*3/MM3 (ref 1.7–7)
NEUTROPHILS # BLD AUTO: 5.06 10*3/MM3 (ref 1.7–7)
NEUTROPHILS # BLD AUTO: 7.29 10*3/MM3 (ref 1.7–7)
NEUTROPHILS # BLD AUTO: 7.39 10*3/MM3 (ref 1.7–7)
NEUTROPHILS # BLD AUTO: 8.88 10*3/MM3 (ref 1.7–7)
NEUTROPHILS # BLD AUTO: 9.36 10*3/MM3 (ref 1.7–7)
NEUTROPHILS NFR BLD AUTO: 1.4 10*3/MM3 (ref 1.7–7)
NEUTROPHILS NFR BLD AUTO: 51.1 % (ref 42.7–76)
NEUTROPHILS NFR BLD MANUAL: 31 % (ref 42.7–76)
NEUTROPHILS NFR BLD MANUAL: 34 % (ref 42.7–76)
NEUTROPHILS NFR BLD MANUAL: 50 % (ref 42.7–76)
NEUTROPHILS NFR BLD MANUAL: 52 % (ref 42.7–76)
NEUTROPHILS NFR BLD MANUAL: 58 % (ref 42.7–76)
NEUTROPHILS NFR BLD MANUAL: 66 % (ref 42.7–76)
NEUTROPHILS NFR BLD MANUAL: 70 % (ref 42.7–76)
NEUTROPHILS NFR BLD MANUAL: 75 % (ref 42.7–76)
NEUTROPHILS NFR BLD MANUAL: 80 % (ref 42.7–76)
NEUTS BAND NFR BLD MANUAL: 1 % (ref 0–5)
NEUTS BAND NFR BLD MANUAL: 10 % (ref 0–5)
NEUTS BAND NFR BLD MANUAL: 18 % (ref 0–5)
NEUTS BAND NFR BLD MANUAL: 18 % (ref 0–5)
NEUTS BAND NFR BLD MANUAL: 24 % (ref 0–5)
NEUTS BAND NFR BLD MANUAL: 27 % (ref 0–5)
NEUTS BAND NFR BLD MANUAL: 33 % (ref 0–5)
NEUTS BAND NFR BLD MANUAL: 46 % (ref 0–5)
NEUTS BAND NFR BLD MANUAL: 6 % (ref 0–5)
NEUTS VAC BLD QL SMEAR: ABNORMAL
NEUTS VAC BLD QL SMEAR: ABNORMAL
NITRITE UR QL STRIP: NEGATIVE
NRBC BLD AUTO-RTO: 0.8 /100 WBC (ref 0–0.2)
NT-PROBNP SERPL-MCNC: 2100 PG/ML (ref 0–900)
NT-PROBNP SERPL-MCNC: <36 PG/ML (ref 0–900)
OPIATES UR QL: NEGATIVE
OSMOLALITY SERPL: 372 MOSM/KG (ref 275–295)
OXYCODONE UR QL SCN: NEGATIVE
PATH REPORT.FINAL DX SPEC: NORMAL
PATHOLOGY REVIEW: YES
PAW @ PEAK INSP FLOW SETTING VENT: 25 CMH2O
PAW @ PEAK INSP FLOW SETTING VENT: 25 CMH2O
PCO2 BLDA: 35 MM HG (ref 35–48)
PCO2 BLDA: 37.1 MM HG (ref 35–48)
PCO2 BLDA: 42.1 MM HG (ref 35–48)
PCO2 BLDA: 42.7 MM HG (ref 35–48)
PCO2 BLDA: 57.8 MM HG (ref 35–48)
PCO2 BLDA: 61.6 MM HG (ref 35–48)
PCO2 BLDA: 68.7 MM HG (ref 35–48)
PCO2 BLDA: 74.7 MM HG (ref 35–48)
PCO2 BLDA: 83 MM HG (ref 35–48)
PEEP RESPIRATORY: 10 CM[H2O]
PEEP RESPIRATORY: 5 CM[H2O]
PEEP RESPIRATORY: 8 CM[H2O]
PF4 HEPARIN CMPLX IGG SERPL IA: 0.17 OD (ref 0–0.4)
PH BLDA: 6.91 PH UNITS (ref 7.35–7.45)
PH BLDA: 6.94 PH UNITS (ref 7.35–7.45)
PH BLDA: 6.94 PH UNITS (ref 7.35–7.45)
PH BLDA: 7.03 PH UNITS (ref 7.35–7.45)
PH BLDA: 7.13 PH UNITS (ref 7.35–7.45)
PH BLDA: 7.14 PH UNITS (ref 7.35–7.45)
PH BLDA: 7.28 PH UNITS (ref 7.35–7.45)
PH BLDA: 7.38 PH UNITS (ref 7.35–7.45)
PH BLDA: 7.43 PH UNITS (ref 7.35–7.45)
PH UR STRIP.AUTO: 6 [PH] (ref 5–8)
PHOSPHATE SERPL-MCNC: 0.7 MG/DL (ref 2.5–4.5)
PHOSPHATE SERPL-MCNC: 0.8 MG/DL (ref 2.5–4.5)
PHOSPHATE SERPL-MCNC: 0.9 MG/DL (ref 2.5–4.5)
PHOSPHATE SERPL-MCNC: 1.6 MG/DL (ref 2.5–4.5)
PHOSPHATE SERPL-MCNC: 1.7 MG/DL (ref 2.5–4.5)
PHOSPHATE SERPL-MCNC: 1.7 MG/DL (ref 2.5–4.5)
PHOSPHATE SERPL-MCNC: 1.9 MG/DL (ref 2.5–4.5)
PHOSPHATE SERPL-MCNC: 2.6 MG/DL (ref 2.5–4.5)
PHOSPHATE SERPL-MCNC: 3.5 MG/DL (ref 2.5–4.5)
PHOSPHATE SERPL-MCNC: 3.7 MG/DL (ref 2.5–4.5)
PHOSPHATE SERPL-MCNC: 4 MG/DL (ref 2.5–4.5)
PHOSPHATE SERPL-MCNC: 4 MG/DL (ref 2.5–4.5)
PHOSPHATE SERPL-MCNC: 4.1 MG/DL (ref 2.5–4.5)
PHOSPHATE SERPL-MCNC: 4.3 MG/DL (ref 2.5–4.5)
PLAT MORPH BLD: NORMAL
PLAT MORPH BLD: NORMAL
PLATELET # BLD AUTO: 133 10*3/MM3 (ref 140–450)
PLATELET # BLD AUTO: 229 10*3/MM3 (ref 140–450)
PLATELET # BLD AUTO: 231 10*3/MM3 (ref 140–450)
PLATELET # BLD AUTO: 242 10*3/MM3 (ref 140–450)
PLATELET # BLD AUTO: 53 10*3/MM3 (ref 140–450)
PLATELET # BLD AUTO: 54 10*3/MM3 (ref 140–450)
PLATELET # BLD AUTO: 59 10*3/MM3 (ref 140–450)
PLATELET # BLD AUTO: 75 10*3/MM3 (ref 140–450)
PLATELET # BLD AUTO: 81 10*3/MM3 (ref 140–450)
PLATELET # BLD AUTO: 90 10*3/MM3 (ref 140–450)
PLATELET # BLD AUTO: 98 10*3/MM3 (ref 140–450)
PMV BLD AUTO: 6.7 FL (ref 6–12)
PMV BLD AUTO: 7 FL (ref 6–12)
PMV BLD AUTO: 7.2 FL (ref 6–12)
PMV BLD AUTO: 7.2 FL (ref 6–12)
PMV BLD AUTO: 7.7 FL (ref 6–12)
PMV BLD AUTO: 8.1 FL (ref 6–12)
PMV BLD AUTO: 8.3 FL (ref 6–12)
PMV BLD AUTO: 8.4 FL (ref 6–12)
PMV BLD AUTO: 8.6 FL (ref 6–12)
PMV BLD AUTO: 8.8 FL (ref 6–12)
PMV BLD AUTO: 9.1 FL (ref 6–12)
PO2 BLDA: 181 MM HG (ref 83–108)
PO2 BLDA: 190.9 MM HG (ref 83–108)
PO2 BLDA: 194.1 MM HG (ref 83–108)
PO2 BLDA: 226.6 MM HG (ref 83–108)
PO2 BLDA: 229.4 MM HG (ref 83–108)
PO2 BLDA: 376.7 MM HG (ref 83–108)
PO2 BLDA: 55.1 MM HG (ref 83–108)
PO2 BLDA: 69.7 MM HG (ref 83–108)
PO2 BLDA: 70.9 MM HG (ref 83–108)
POTASSIUM BLDA-SCNC: 2.9 MMOL/L (ref 3.5–4.5)
POTASSIUM BLDA-SCNC: 3 MMOL/L (ref 3.5–4.5)
POTASSIUM BLDA-SCNC: 3.7 MMOL/L (ref 3.5–4.9)
POTASSIUM SERPL-SCNC: 2.8 MMOL/L (ref 3.5–5.2)
POTASSIUM SERPL-SCNC: 2.8 MMOL/L (ref 3.5–5.2)
POTASSIUM SERPL-SCNC: 3.1 MMOL/L (ref 3.5–5.2)
POTASSIUM SERPL-SCNC: 3.1 MMOL/L (ref 3.5–5.2)
POTASSIUM SERPL-SCNC: 3.3 MMOL/L (ref 3.5–5.2)
POTASSIUM SERPL-SCNC: 3.4 MMOL/L (ref 3.5–5.2)
POTASSIUM SERPL-SCNC: 3.8 MMOL/L (ref 3.5–5.2)
POTASSIUM SERPL-SCNC: 3.9 MMOL/L (ref 3.5–5.2)
POTASSIUM SERPL-SCNC: 4.2 MMOL/L (ref 3.5–5.2)
POTASSIUM SERPL-SCNC: 4.3 MMOL/L (ref 3.5–5.2)
POTASSIUM SERPL-SCNC: 4.3 MMOL/L (ref 3.5–5.2)
POTASSIUM SERPL-SCNC: 4.4 MMOL/L (ref 3.5–5.2)
POTASSIUM SERPL-SCNC: 4.5 MMOL/L (ref 3.5–5.2)
POTASSIUM SERPL-SCNC: 4.8 MMOL/L (ref 3.5–5.2)
POTASSIUM SERPL-SCNC: 5.6 MMOL/L (ref 3.5–5.2)
POTASSIUM SERPL-SCNC: 5.7 MMOL/L (ref 3.5–5.2)
PROCALCITONIN SERPL-MCNC: 86.17 NG/ML (ref 0–0.25)
PROMYELOCYTES NFR BLD MANUAL: 1 % (ref 0–0)
PROT SERPL-MCNC: 4.7 G/DL (ref 6–8.5)
PROT SERPL-MCNC: 4.8 G/DL (ref 6–8.5)
PROT SERPL-MCNC: 4.8 G/DL (ref 6–8.5)
PROT SERPL-MCNC: 4.9 G/DL (ref 6–8.5)
PROT SERPL-MCNC: 5 G/DL (ref 6–8.5)
PROT SERPL-MCNC: 5.1 G/DL (ref 6–8.5)
PROT SERPL-MCNC: 5.4 G/DL (ref 6–8.5)
PROT SERPL-MCNC: 5.5 G/DL (ref 6–8.5)
PROT SERPL-MCNC: 6.3 G/DL (ref 6–8.5)
PROT UR QL STRIP: NEGATIVE
PROTHROMBIN TIME: 13 SECONDS (ref 9.6–11.7)
PROTHROMBIN TIME: 13.7 SECONDS (ref 9.6–11.7)
PSV: 36 CMH2O
PSV: 36 CMH2O
QT INTERVAL: 369 MS
QTC INTERVAL: 431 MS
RBC # BLD AUTO: 2.61 10*6/MM3 (ref 4.14–5.8)
RBC # BLD AUTO: 2.7 10*6/MM3 (ref 4.14–5.8)
RBC # BLD AUTO: 2.73 10*6/MM3 (ref 4.14–5.8)
RBC # BLD AUTO: 2.87 10*6/MM3 (ref 4.14–5.8)
RBC # BLD AUTO: 2.88 10*6/MM3 (ref 4.14–5.8)
RBC # BLD AUTO: 2.99 10*6/MM3 (ref 4.14–5.8)
RBC # BLD AUTO: 3.64 10*6/MM3 (ref 4.14–5.8)
RBC # BLD AUTO: 4.11 10*6/MM3 (ref 4.14–5.8)
RBC # BLD AUTO: 4.47 10*6/MM3 (ref 4.14–5.8)
RBC # BLD AUTO: 4.89 10*6/MM3 (ref 4.14–5.8)
RBC # BLD AUTO: 4.89 10*6/MM3 (ref 4.14–5.8)
RBC # UR STRIP: ABNORMAL /HPF
RBC MORPH BLD: NORMAL
REF LAB TEST METHOD: ABNORMAL
REF LAB TEST METHOD: NORMAL
REF LAB TEST METHOD: NORMAL
REF LAB TEST RESULTS: NORMAL
REF LAB TEST RESULTS: NORMAL
RESPIRATORY RATE: 24
RESPIRATORY RATE: 30
RH BLD: POSITIVE
RH BLD: POSITIVE
RHINOVIRUS RNA SPEC NAA+PROBE: NOT DETECTED
RSV RNA NPH QL NAA+NON-PROBE: NOT DETECTED
SALICYLATES SERPL-MCNC: 0.4 MG/DL
SAO2 % BLDCOA: 71.4 % (ref 94–98)
SAO2 % BLDCOA: 78.8 % (ref 94–98)
SAO2 % BLDCOA: 88 % (ref 94–98)
SAO2 % BLDCOA: 98.5 % (ref 94–98)
SAO2 % BLDCOA: 99.1 % (ref 94–98)
SAO2 % BLDCOA: 99.2 % (ref 94–98)
SAO2 % BLDCOA: 99.7 % (ref 94–98)
SAO2 % BLDCOA: 99.8 % (ref 94–98)
SAO2 % BLDCOA: 99.9 % (ref 94–98)
SARS-COV-2 RNA NPH QL NAA+NON-PROBE: NOT DETECTED
SARS-COV-2 RNA RESP QL NAA+PROBE: NOT DETECTED
SCAN SLIDE: NORMAL
SMALL PLATELETS BLD QL SMEAR: ABNORMAL
SODIUM BLD-SCNC: 140 MMOL/L (ref 138–146)
SODIUM BLD-SCNC: 143 MMOL/L (ref 138–146)
SODIUM BLD-SCNC: 147 MMOL/L (ref 138–146)
SODIUM SERPL-SCNC: 136 MMOL/L (ref 136–145)
SODIUM SERPL-SCNC: 137 MMOL/L (ref 136–145)
SODIUM SERPL-SCNC: 138 MMOL/L (ref 136–145)
SODIUM SERPL-SCNC: 139 MMOL/L (ref 136–145)
SODIUM SERPL-SCNC: 140 MMOL/L (ref 136–145)
SODIUM SERPL-SCNC: 141 MMOL/L (ref 136–145)
SODIUM SERPL-SCNC: 142 MMOL/L (ref 136–145)
SODIUM SERPL-SCNC: 144 MMOL/L (ref 136–145)
SODIUM SERPL-SCNC: 146 MMOL/L (ref 136–145)
SODIUM SERPL-SCNC: 147 MMOL/L (ref 136–145)
SODIUM SERPL-SCNC: 148 MMOL/L (ref 136–145)
SP GR UR STRIP: <=1.005 (ref 1–1.03)
SPERM URNS QL MICRO: ABNORMAL /HPF
SQUAMOUS #/AREA URNS HPF: ABNORMAL /HPF
T&S EXPIRATION DATE: NORMAL
TOXIC GRANULATION: ABNORMAL
TRIGL SERPL-MCNC: 90 MG/DL (ref 0–150)
TROPONIN T DELTA: 288 NG/L
TROPONIN T SERPL HS-MCNC: 10 NG/L
TSH SERPL DL<=0.05 MIU/L-ACNC: 1.92 UIU/ML (ref 0.27–4.2)
UNIT  ABO: NORMAL
UNIT  RH: NORMAL
UPPER ARTERIAL LEFT ARM RADIAL SYS MAX: 136
UPPER ARTERIAL LEFT ARM ULNAR SYS MAX: 130
UPPER ARTERIAL RIGHT ARM BRACHIAL SYS MAX: 82
UPPER ARTERIAL RIGHT ARM RADIAL SYS MAX: 132
UPPER ARTERIAL RIGHT ARM ULNAR SYS MAX: 130
UROBILINOGEN UR QL STRIP: ABNORMAL
VANCOMYCIN SERPL-MCNC: 16.7 MCG/ML (ref 5–40)
VARIANT LYMPHS NFR BLD MANUAL: 1 % (ref 0–5)
VARIANT LYMPHS NFR BLD MANUAL: 1 % (ref 0–5)
VARIANT LYMPHS NFR BLD MANUAL: 10 % (ref 19.6–45.3)
VARIANT LYMPHS NFR BLD MANUAL: 3 % (ref 0–5)
VARIANT LYMPHS NFR BLD MANUAL: 4 % (ref 0–5)
VARIANT LYMPHS NFR BLD MANUAL: 5 % (ref 19.6–45.3)
VARIANT LYMPHS NFR BLD MANUAL: 5 % (ref 19.6–45.3)
VARIANT LYMPHS NFR BLD MANUAL: 59 % (ref 19.6–45.3)
VARIANT LYMPHS NFR BLD MANUAL: 7 % (ref 19.6–45.3)
VARIANT LYMPHS NFR BLD MANUAL: 8 % (ref 19.6–45.3)
VARIANT LYMPHS NFR BLD MANUAL: 9 % (ref 19.6–45.3)
VENTILATOR MODE: ABNORMAL
VENTILATOR MODE: AC
VENTILATOR MODE: AC
VLDLC SERPL-MCNC: 16 MG/DL (ref 5–40)
VT ON VENT VENT: 500 ML
WBC # UR STRIP: ABNORMAL /HPF
WBC MORPH BLD: NORMAL
WBC NRBC COR # BLD: 10.7 10*3/MM3 (ref 3.4–10.8)
WBC NRBC COR # BLD: 11.7 10*3/MM3 (ref 3.4–10.8)
WBC NRBC COR # BLD: 14.8 10*3/MM3 (ref 3.4–10.8)
WBC NRBC COR # BLD: 18.3 10*3/MM3 (ref 3.4–10.8)
WBC NRBC COR # BLD: 2 10*3/MM3 (ref 3.4–10.8)
WBC NRBC COR # BLD: 2.8 10*3/MM3 (ref 3.4–10.8)
WBC NRBC COR # BLD: 20.2 10*3/MM3 (ref 3.4–10.8)
WBC NRBC COR # BLD: 6.4 10*3/MM3 (ref 3.4–10.8)
WBC NRBC COR # BLD: 7.5 10*3/MM3 (ref 3.4–10.8)
WBC NRBC COR # BLD: 8.1 10*3/MM3 (ref 3.4–10.8)
WBC NRBC COR # BLD: 8.4 10*3/MM3 (ref 3.4–10.8)
WHOLE BLOOD HOLD COAG: NORMAL
WHOLE BLOOD HOLD COAG: NORMAL
WHOLE BLOOD HOLD SPECIMEN: NORMAL
WHOLE BLOOD HOLD SPECIMEN: NORMAL

## 2023-01-01 PROCEDURE — P9047 ALBUMIN (HUMAN), 25%, 50ML: HCPCS | Performed by: NURSE PRACTITIONER

## 2023-01-01 PROCEDURE — 25010000002 METRONIDAZOLE 500 MG/100ML SOLUTION: Performed by: INTERNAL MEDICINE

## 2023-01-01 PROCEDURE — 25010000002 FOMEPIZOLE 1.5 GM/1.5ML SOLUTION 1.5 ML VIAL: Performed by: NURSE PRACTITIONER

## 2023-01-01 PROCEDURE — 80050 GENERAL HEALTH PANEL: CPT | Performed by: STUDENT IN AN ORGANIZED HEALTH CARE EDUCATION/TRAINING PROGRAM

## 2023-01-01 PROCEDURE — 94799 UNLISTED PULMONARY SVC/PX: CPT

## 2023-01-01 PROCEDURE — 25010000002 EPINEPHRINE 1 MG/10ML SOLUTION PREFILLED SYRINGE: Performed by: EMERGENCY MEDICINE

## 2023-01-01 PROCEDURE — 84132 ASSAY OF SERUM POTASSIUM: CPT | Performed by: INTERNAL MEDICINE

## 2023-01-01 PROCEDURE — 82693 ASSAY OF ETHYLENE GLYCOL: CPT | Performed by: NURSE PRACTITIONER

## 2023-01-01 PROCEDURE — 83880 ASSAY OF NATRIURETIC PEPTIDE: CPT | Performed by: INTERNAL MEDICINE

## 2023-01-01 PROCEDURE — 83036 HEMOGLOBIN GLYCOSYLATED A1C: CPT | Performed by: STUDENT IN AN ORGANIZED HEALTH CARE EDUCATION/TRAINING PROGRAM

## 2023-01-01 PROCEDURE — 76705 ECHO EXAM OF ABDOMEN: CPT

## 2023-01-01 PROCEDURE — P9035 PLATELET PHERES LEUKOREDUCED: HCPCS

## 2023-01-01 PROCEDURE — 25010000002 LEVETRIRACETAM PER 10 MG: Performed by: NURSE PRACTITIONER

## 2023-01-01 PROCEDURE — 0 DEXTROSE 5 % SOLUTION 1,000 ML FLEX CONT: Performed by: INTERNAL MEDICINE

## 2023-01-01 PROCEDURE — 02HV33Z INSERTION OF INFUSION DEVICE INTO SUPERIOR VENA CAVA, PERCUTANEOUS APPROACH: ICD-10-PCS | Performed by: INTERNAL MEDICINE

## 2023-01-01 PROCEDURE — 84100 ASSAY OF PHOSPHORUS: CPT | Performed by: INTERNAL MEDICINE

## 2023-01-01 PROCEDURE — 85018 HEMOGLOBIN: CPT | Performed by: NURSE PRACTITIONER

## 2023-01-01 PROCEDURE — 87147 CULTURE TYPE IMMUNOLOGIC: CPT | Performed by: EMERGENCY MEDICINE

## 2023-01-01 PROCEDURE — 85007 BL SMEAR W/DIFF WBC COUNT: CPT | Performed by: NURSE PRACTITIONER

## 2023-01-01 PROCEDURE — 94761 N-INVAS EAR/PLS OXIMETRY MLT: CPT

## 2023-01-01 PROCEDURE — 82330 ASSAY OF CALCIUM: CPT | Performed by: INTERNAL MEDICINE

## 2023-01-01 PROCEDURE — 94645 CONT INHLJ TX EACH ADDL HOUR: CPT

## 2023-01-01 PROCEDURE — 43246 EGD PLACE GASTROSTOMY TUBE: CPT | Performed by: STUDENT IN AN ORGANIZED HEALTH CARE EDUCATION/TRAINING PROGRAM

## 2023-01-01 PROCEDURE — 36430 TRANSFUSION BLD/BLD COMPNT: CPT

## 2023-01-01 PROCEDURE — 85018 HEMOGLOBIN: CPT

## 2023-01-01 PROCEDURE — A9539 TC99M PENTETATE: HCPCS | Performed by: INTERNAL MEDICINE

## 2023-01-01 PROCEDURE — 87040 BLOOD CULTURE FOR BACTERIA: CPT | Performed by: INTERNAL MEDICINE

## 2023-01-01 PROCEDURE — 36600 WITHDRAWAL OF ARTERIAL BLOOD: CPT

## 2023-01-01 PROCEDURE — 0B110F4 BYPASS TRACHEA TO CUTANEOUS WITH TRACHEOSTOMY DEVICE, OPEN APPROACH: ICD-10-PCS | Performed by: STUDENT IN AN ORGANIZED HEALTH CARE EDUCATION/TRAINING PROGRAM

## 2023-01-01 PROCEDURE — 94003 VENT MGMT INPAT SUBQ DAY: CPT

## 2023-01-01 PROCEDURE — 25010000002 PIPERACILLIN SOD-TAZOBACTAM PER 1 G: Performed by: STUDENT IN AN ORGANIZED HEALTH CARE EDUCATION/TRAINING PROGRAM

## 2023-01-01 PROCEDURE — 25010000002 VANCOMYCIN HCL IN NACL 1.75-0.9 GM/500ML-% SOLUTION: Performed by: INTERNAL MEDICINE

## 2023-01-01 PROCEDURE — 80307 DRUG TEST PRSMV CHEM ANLYZR: CPT | Performed by: EMERGENCY MEDICINE

## 2023-01-01 PROCEDURE — 85025 COMPLETE CBC W/AUTO DIFF WBC: CPT | Performed by: STUDENT IN AN ORGANIZED HEALTH CARE EDUCATION/TRAINING PROGRAM

## 2023-01-01 PROCEDURE — 02HV33Z INSERTION OF INFUSION DEVICE INTO SUPERIOR VENA CAVA, PERCUTANEOUS APPROACH: ICD-10-PCS

## 2023-01-01 PROCEDURE — 82330 ASSAY OF CALCIUM: CPT | Performed by: NURSE PRACTITIONER

## 2023-01-01 PROCEDURE — 83735 ASSAY OF MAGNESIUM: CPT | Performed by: STUDENT IN AN ORGANIZED HEALTH CARE EDUCATION/TRAINING PROGRAM

## 2023-01-01 PROCEDURE — 83605 ASSAY OF LACTIC ACID: CPT | Performed by: INTERNAL MEDICINE

## 2023-01-01 PROCEDURE — 25010000002 FENTANYL CITRATE (PF) 50 MCG/ML SOLUTION

## 2023-01-01 PROCEDURE — 70450 CT HEAD/BRAIN W/O DYE: CPT

## 2023-01-01 PROCEDURE — 99232 SBSQ HOSP IP/OBS MODERATE 35: CPT | Performed by: PSYCHIATRY & NEUROLOGY

## 2023-01-01 PROCEDURE — 85007 BL SMEAR W/DIFF WBC COUNT: CPT | Performed by: STUDENT IN AN ORGANIZED HEALTH CARE EDUCATION/TRAINING PROGRAM

## 2023-01-01 PROCEDURE — 4A133B1 MONITORING OF ARTERIAL PRESSURE, PERIPHERAL, PERCUTANEOUS APPROACH: ICD-10-PCS

## 2023-01-01 PROCEDURE — 82948 REAGENT STRIP/BLOOD GLUCOSE: CPT

## 2023-01-01 PROCEDURE — 87641 MR-STAPH DNA AMP PROBE: CPT | Performed by: INTERNAL MEDICINE

## 2023-01-01 PROCEDURE — 25010000002 PHENYLEPHRINE 10 MG/ML SOLUTION: Performed by: NURSE PRACTITIONER

## 2023-01-01 PROCEDURE — 80053 COMPREHEN METABOLIC PANEL: CPT | Performed by: STUDENT IN AN ORGANIZED HEALTH CARE EDUCATION/TRAINING PROGRAM

## 2023-01-01 PROCEDURE — 0JH63XZ INSERTION OF TUNNELED VASCULAR ACCESS DEVICE INTO CHEST SUBCUTANEOUS TISSUE AND FASCIA, PERCUTANEOUS APPROACH: ICD-10-PCS | Performed by: RADIOLOGY

## 2023-01-01 PROCEDURE — 86901 BLOOD TYPING SEROLOGIC RH(D): CPT | Performed by: INTERNAL MEDICINE

## 2023-01-01 PROCEDURE — C1750 CATH, HEMODIALYSIS,LONG-TERM: HCPCS

## 2023-01-01 PROCEDURE — 25010000002 VASOPRESSIN 0.2 UNIT/ML SOLUTION: Performed by: NURSE PRACTITIONER

## 2023-01-01 PROCEDURE — 74018 RADEX ABDOMEN 1 VIEW: CPT

## 2023-01-01 PROCEDURE — 0 TECHNETIUM TC 99M PENTETATE KIT: Performed by: INTERNAL MEDICINE

## 2023-01-01 PROCEDURE — 80179 DRUG ASSAY SALICYLATE: CPT | Performed by: EMERGENCY MEDICINE

## 2023-01-01 PROCEDURE — 25010000002 METOCLOPRAMIDE PER 10 MG: Performed by: INTERNAL MEDICINE

## 2023-01-01 PROCEDURE — 25010000002 POTASSIUM CHLORIDE 10 MEQ/100ML SOLUTION

## 2023-01-01 PROCEDURE — 4A133B1 MONITORING OF ARTERIAL PRESSURE, PERIPHERAL, PERCUTANEOUS APPROACH: ICD-10-PCS | Performed by: STUDENT IN AN ORGANIZED HEALTH CARE EDUCATION/TRAINING PROGRAM

## 2023-01-01 PROCEDURE — 71045 X-RAY EXAM CHEST 1 VIEW: CPT

## 2023-01-01 PROCEDURE — 25010000002 ATROPINE SULFATE: Performed by: EMERGENCY MEDICINE

## 2023-01-01 PROCEDURE — 83930 ASSAY OF BLOOD OSMOLALITY: CPT | Performed by: INTERNAL MEDICINE

## 2023-01-01 PROCEDURE — 81001 URINALYSIS AUTO W/SCOPE: CPT | Performed by: EMERGENCY MEDICINE

## 2023-01-01 PROCEDURE — 83735 ASSAY OF MAGNESIUM: CPT | Performed by: EMERGENCY MEDICINE

## 2023-01-01 PROCEDURE — C1751 CATH, INF, PER/CENT/MIDLINE: HCPCS

## 2023-01-01 PROCEDURE — 83735 ASSAY OF MAGNESIUM: CPT | Performed by: NURSE PRACTITIONER

## 2023-01-01 PROCEDURE — 83735 ASSAY OF MAGNESIUM: CPT | Performed by: INTERNAL MEDICINE

## 2023-01-01 PROCEDURE — 05HY33Z INSERTION OF INFUSION DEVICE INTO UPPER VEIN, PERCUTANEOUS APPROACH: ICD-10-PCS

## 2023-01-01 PROCEDURE — 25010000002 ALBUMIN HUMAN 25% PER 50 ML: Performed by: NURSE PRACTITIONER

## 2023-01-01 PROCEDURE — P9012 CRYOPRECIPITATE EACH UNIT: HCPCS

## 2023-01-01 PROCEDURE — 85027 COMPLETE CBC AUTOMATED: CPT | Performed by: INTERNAL MEDICINE

## 2023-01-01 PROCEDURE — 25010000002 POTASSIUM CHLORIDE PER 2 MEQ: Performed by: STUDENT IN AN ORGANIZED HEALTH CARE EDUCATION/TRAINING PROGRAM

## 2023-01-01 PROCEDURE — 25810000003 SODIUM CHLORIDE 0.9 % SOLUTION: Performed by: ANESTHESIOLOGIST ASSISTANT

## 2023-01-01 PROCEDURE — 94002 VENT MGMT INPAT INIT DAY: CPT

## 2023-01-01 PROCEDURE — 86850 RBC ANTIBODY SCREEN: CPT | Performed by: INTERNAL MEDICINE

## 2023-01-01 PROCEDURE — 80048 BASIC METABOLIC PNL TOTAL CA: CPT | Performed by: INTERNAL MEDICINE

## 2023-01-01 PROCEDURE — 93931 UPPER EXTREMITY STUDY: CPT

## 2023-01-01 PROCEDURE — 25010000002 FUROSEMIDE PER 20 MG: Performed by: STUDENT IN AN ORGANIZED HEALTH CARE EDUCATION/TRAINING PROGRAM

## 2023-01-01 PROCEDURE — 25010000002 PIPERACILLIN SOD-TAZOBACTAM PER 1 G: Performed by: INTERNAL MEDICINE

## 2023-01-01 PROCEDURE — 25810000003 SODIUM CHLORIDE 0.9 % SOLUTION: Performed by: EMERGENCY MEDICINE

## 2023-01-01 PROCEDURE — 84145 PROCALCITONIN (PCT): CPT | Performed by: INTERNAL MEDICINE

## 2023-01-01 PROCEDURE — 25810000003 SODIUM CHLORIDE 0.9 % SOLUTION: Performed by: STUDENT IN AN ORGANIZED HEALTH CARE EDUCATION/TRAINING PROGRAM

## 2023-01-01 PROCEDURE — 99222 1ST HOSP IP/OBS MODERATE 55: CPT | Performed by: STUDENT IN AN ORGANIZED HEALTH CARE EDUCATION/TRAINING PROGRAM

## 2023-01-01 PROCEDURE — 25010000002 CALCIUM GLUCONATE-NACL 1-0.675 GM/50ML-% SOLUTION: Performed by: INTERNAL MEDICINE

## 2023-01-01 PROCEDURE — 4A133J1 MONITORING OF ARTERIAL PULSE, PERIPHERAL, PERCUTANEOUS APPROACH: ICD-10-PCS | Performed by: STUDENT IN AN ORGANIZED HEALTH CARE EDUCATION/TRAINING PROGRAM

## 2023-01-01 PROCEDURE — 80143 DRUG ASSAY ACETAMINOPHEN: CPT | Performed by: EMERGENCY MEDICINE

## 2023-01-01 PROCEDURE — 25010000002 VASOPRESSIN 0.2 UNIT/ML SOLUTION: Performed by: INTERNAL MEDICINE

## 2023-01-01 PROCEDURE — 82565 ASSAY OF CREATININE: CPT

## 2023-01-01 PROCEDURE — 93922 UPR/L XTREMITY ART 2 LEVELS: CPT

## 2023-01-01 PROCEDURE — 82803 BLOOD GASES ANY COMBINATION: CPT

## 2023-01-01 PROCEDURE — 83735 ASSAY OF MAGNESIUM: CPT

## 2023-01-01 PROCEDURE — 85014 HEMATOCRIT: CPT

## 2023-01-01 PROCEDURE — 80069 RENAL FUNCTION PANEL: CPT | Performed by: INTERNAL MEDICINE

## 2023-01-01 PROCEDURE — 25810000003 SODIUM CHLORIDE 0.9 % SOLUTION 250 ML FLEX CONT: Performed by: STUDENT IN AN ORGANIZED HEALTH CARE EDUCATION/TRAINING PROGRAM

## 2023-01-01 PROCEDURE — 25010000002 CALCIUM GLUCONATE PER 10 ML

## 2023-01-01 PROCEDURE — 87324 CLOSTRIDIUM AG IA: CPT | Performed by: INTERNAL MEDICINE

## 2023-01-01 PROCEDURE — 93308 TTE F-UP OR LMTD: CPT

## 2023-01-01 PROCEDURE — 84100 ASSAY OF PHOSPHORUS: CPT

## 2023-01-01 PROCEDURE — 84484 ASSAY OF TROPONIN QUANT: CPT | Performed by: EMERGENCY MEDICINE

## 2023-01-01 PROCEDURE — C1752 CATH,HEMODIALYSIS,SHORT-TERM: HCPCS

## 2023-01-01 PROCEDURE — 5A1D90Z PERFORMANCE OF URINARY FILTRATION, CONTINUOUS, GREATER THAN 18 HOURS PER DAY: ICD-10-PCS

## 2023-01-01 PROCEDURE — 0 DEXTROSE 5 % SOLUTION 1,000 ML FLEX CONT: Performed by: STUDENT IN AN ORGANIZED HEALTH CARE EDUCATION/TRAINING PROGRAM

## 2023-01-01 PROCEDURE — 85384 FIBRINOGEN ACTIVITY: CPT | Performed by: INTERNAL MEDICINE

## 2023-01-01 PROCEDURE — 87449 NOS EACH ORGANISM AG IA: CPT | Performed by: INTERNAL MEDICINE

## 2023-01-01 PROCEDURE — 25810000003 SODIUM CHLORIDE 0.9 % SOLUTION 250 ML FLEX CONT: Performed by: INTERNAL MEDICINE

## 2023-01-01 PROCEDURE — 85014 HEMATOCRIT: CPT | Performed by: NURSE PRACTITIONER

## 2023-01-01 PROCEDURE — 82010 KETONE BODYS QUAN: CPT | Performed by: NURSE PRACTITIONER

## 2023-01-01 PROCEDURE — 85018 HEMOGLOBIN: CPT | Performed by: INTERNAL MEDICINE

## 2023-01-01 PROCEDURE — 84100 ASSAY OF PHOSPHORUS: CPT | Performed by: STUDENT IN AN ORGANIZED HEALTH CARE EDUCATION/TRAINING PROGRAM

## 2023-01-01 PROCEDURE — 25010000002 LEVETRIRACETAM PER 10 MG: Performed by: STUDENT IN AN ORGANIZED HEALTH CARE EDUCATION/TRAINING PROGRAM

## 2023-01-01 PROCEDURE — 25010000002 HEPARIN (PORCINE) PER 1000 UNITS: Performed by: STUDENT IN AN ORGANIZED HEALTH CARE EDUCATION/TRAINING PROGRAM

## 2023-01-01 PROCEDURE — 92950 HEART/LUNG RESUSCITATION CPR: CPT

## 2023-01-01 PROCEDURE — 77001 FLUOROGUIDE FOR VEIN DEVICE: CPT

## 2023-01-01 PROCEDURE — 0DH63UZ INSERTION OF FEEDING DEVICE INTO STOMACH, PERCUTANEOUS APPROACH: ICD-10-PCS | Performed by: STUDENT IN AN ORGANIZED HEALTH CARE EDUCATION/TRAINING PROGRAM

## 2023-01-01 PROCEDURE — 84600 ASSAY OF VOLATILES: CPT | Performed by: NURSE PRACTITIONER

## 2023-01-01 PROCEDURE — 82077 ASSAY SPEC XCP UR&BREATH IA: CPT | Performed by: EMERGENCY MEDICINE

## 2023-01-01 PROCEDURE — 86927 PLASMA FRESH FROZEN: CPT

## 2023-01-01 PROCEDURE — 05HN33Z INSERTION OF INFUSION DEVICE INTO LEFT INTERNAL JUGULAR VEIN, PERCUTANEOUS APPROACH: ICD-10-PCS | Performed by: RADIOLOGY

## 2023-01-01 PROCEDURE — 31600 PLANNED TRACHEOSTOMY: CPT | Performed by: STUDENT IN AN ORGANIZED HEALTH CARE EDUCATION/TRAINING PROGRAM

## 2023-01-01 PROCEDURE — 87636 SARSCOV2 & INF A&B AMP PRB: CPT | Performed by: EMERGENCY MEDICINE

## 2023-01-01 PROCEDURE — 80320 DRUG SCREEN QUANTALCOHOLS: CPT | Performed by: NURSE PRACTITIONER

## 2023-01-01 PROCEDURE — 99285 EMERGENCY DEPT VISIT HI MDM: CPT

## 2023-01-01 PROCEDURE — P9612 CATHETERIZE FOR URINE SPEC: HCPCS

## 2023-01-01 PROCEDURE — 25010000002 HEPARIN (PORCINE) PER 1000 UNITS: Performed by: INTERNAL MEDICINE

## 2023-01-01 PROCEDURE — 0202U NFCT DS 22 TRGT SARS-COV-2: CPT | Performed by: INTERNAL MEDICINE

## 2023-01-01 PROCEDURE — 25010000002 VANCOMYCIN HCL 1.25 G RECONSTITUTED SOLUTION 1 EACH VIAL: Performed by: INTERNAL MEDICINE

## 2023-01-01 PROCEDURE — P9100 PATHOGEN TEST FOR PLATELETS: HCPCS

## 2023-01-01 PROCEDURE — 85025 COMPLETE CBC W/AUTO DIFF WBC: CPT | Performed by: EMERGENCY MEDICINE

## 2023-01-01 PROCEDURE — 25010000002 AMIODARONE PER 30 MG: Performed by: EMERGENCY MEDICINE

## 2023-01-01 PROCEDURE — 80053 COMPREHEN METABOLIC PANEL: CPT | Performed by: EMERGENCY MEDICINE

## 2023-01-01 PROCEDURE — 74176 CT ABD & PELVIS W/O CONTRAST: CPT

## 2023-01-01 PROCEDURE — C1894 INTRO/SHEATH, NON-LASER: HCPCS

## 2023-01-01 PROCEDURE — 82330 ASSAY OF CALCIUM: CPT

## 2023-01-01 PROCEDURE — 94640 AIRWAY INHALATION TREATMENT: CPT

## 2023-01-01 PROCEDURE — 25010000002 EPOPROSTENOL PER 0.5 MG: Performed by: STUDENT IN AN ORGANIZED HEALTH CARE EDUCATION/TRAINING PROGRAM

## 2023-01-01 PROCEDURE — 80051 ELECTROLYTE PANEL: CPT

## 2023-01-01 PROCEDURE — 25010000002 CEFEPIME PER 500 MG: Performed by: INTERNAL MEDICINE

## 2023-01-01 PROCEDURE — 76937 US GUIDE VASCULAR ACCESS: CPT

## 2023-01-01 PROCEDURE — 25010000002 EPINEPHRINE 1 MG/ML SOLUTION: Performed by: EMERGENCY MEDICINE

## 2023-01-01 PROCEDURE — 85007 BL SMEAR W/DIFF WBC COUNT: CPT | Performed by: EMERGENCY MEDICINE

## 2023-01-01 PROCEDURE — 36558 INSERT TUNNELED CV CATH: CPT

## 2023-01-01 PROCEDURE — 78606 BRAIN IMAGE W/FLOW 4 + VIEWS: CPT

## 2023-01-01 PROCEDURE — 25010000002 POTASSIUM CHLORIDE PER 2 MEQ: Performed by: INTERNAL MEDICINE

## 2023-01-01 PROCEDURE — 25810000003 SODIUM CHLORIDE 0.9 % SOLUTION 250 ML FLEX CONT: Performed by: NURSE PRACTITIONER

## 2023-01-01 PROCEDURE — 25010000002 DOBUTAMINE PER 250 MG: Performed by: INTERNAL MEDICINE

## 2023-01-01 PROCEDURE — 63710000001 INSULIN LISPRO (HUMAN) PER 5 UNITS: Performed by: STUDENT IN AN ORGANIZED HEALTH CARE EDUCATION/TRAINING PROGRAM

## 2023-01-01 PROCEDURE — 85610 PROTHROMBIN TIME: CPT | Performed by: NURSE PRACTITIONER

## 2023-01-01 PROCEDURE — 85362 FIBRIN DEGRADATION PRODUCTS: CPT | Performed by: INTERNAL MEDICINE

## 2023-01-01 PROCEDURE — 99222 1ST HOSP IP/OBS MODERATE 55: CPT | Performed by: PSYCHIATRY & NEUROLOGY

## 2023-01-01 PROCEDURE — 83605 ASSAY OF LACTIC ACID: CPT

## 2023-01-01 PROCEDURE — 82077 ASSAY SPEC XCP UR&BREATH IA: CPT | Performed by: NURSE PRACTITIONER

## 2023-01-01 PROCEDURE — 05HY33Z INSERTION OF INFUSION DEVICE INTO UPPER VEIN, PERCUTANEOUS APPROACH: ICD-10-PCS | Performed by: INTERNAL MEDICINE

## 2023-01-01 PROCEDURE — 71275 CT ANGIOGRAPHY CHEST: CPT

## 2023-01-01 PROCEDURE — 25010000002 LIDOCAINE 1 % SOLUTION: Performed by: RADIOLOGY

## 2023-01-01 PROCEDURE — 70551 MRI BRAIN STEM W/O DYE: CPT

## 2023-01-01 PROCEDURE — 93005 ELECTROCARDIOGRAM TRACING: CPT | Performed by: EMERGENCY MEDICINE

## 2023-01-01 PROCEDURE — 82330 ASSAY OF CALCIUM: CPT | Performed by: STUDENT IN AN ORGANIZED HEALTH CARE EDUCATION/TRAINING PROGRAM

## 2023-01-01 PROCEDURE — 25010000002 PHENYLEPHRINE 10 MG/ML SOLUTION 5 ML VIAL: Performed by: STUDENT IN AN ORGANIZED HEALTH CARE EDUCATION/TRAINING PROGRAM

## 2023-01-01 PROCEDURE — 25010000002 MIDAZOLAM PER 1 MG: Performed by: STUDENT IN AN ORGANIZED HEALTH CARE EDUCATION/TRAINING PROGRAM

## 2023-01-01 PROCEDURE — 85014 HEMATOCRIT: CPT | Performed by: INTERNAL MEDICINE

## 2023-01-01 PROCEDURE — 86022 PLATELET ANTIBODIES: CPT | Performed by: INTERNAL MEDICINE

## 2023-01-01 PROCEDURE — 87205 SMEAR GRAM STAIN: CPT | Performed by: STUDENT IN AN ORGANIZED HEALTH CARE EDUCATION/TRAINING PROGRAM

## 2023-01-01 PROCEDURE — 25010000002 EPINEPHRINE 1 MG/ML SOLUTION 30 ML VIAL: Performed by: NURSE PRACTITIONER

## 2023-01-01 PROCEDURE — 25810000003 SODIUM CHLORIDE 0.9 % SOLUTION: Performed by: INTERNAL MEDICINE

## 2023-01-01 PROCEDURE — 25010000002 MAGNESIUM SULFATE 2 GM/50ML SOLUTION

## 2023-01-01 PROCEDURE — 87070 CULTURE OTHR SPECIMN AEROBIC: CPT | Performed by: INTERNAL MEDICINE

## 2023-01-01 PROCEDURE — 85610 PROTHROMBIN TIME: CPT | Performed by: INTERNAL MEDICINE

## 2023-01-01 PROCEDURE — 84100 ASSAY OF PHOSPHORUS: CPT | Performed by: NURSE PRACTITIONER

## 2023-01-01 PROCEDURE — 93308 TTE F-UP OR LMTD: CPT | Performed by: INTERNAL MEDICINE

## 2023-01-01 PROCEDURE — 99221 1ST HOSP IP/OBS SF/LOW 40: CPT | Performed by: NURSE PRACTITIONER

## 2023-01-01 PROCEDURE — 85379 FIBRIN DEGRADATION QUANT: CPT | Performed by: INTERNAL MEDICINE

## 2023-01-01 PROCEDURE — 85730 THROMBOPLASTIN TIME PARTIAL: CPT | Performed by: NURSE PRACTITIONER

## 2023-01-01 PROCEDURE — 5A1955Z RESPIRATORY VENTILATION, GREATER THAN 96 CONSECUTIVE HOURS: ICD-10-PCS

## 2023-01-01 PROCEDURE — 85730 THROMBOPLASTIN TIME PARTIAL: CPT | Performed by: INTERNAL MEDICINE

## 2023-01-01 PROCEDURE — 93306 TTE W/DOPPLER COMPLETE: CPT

## 2023-01-01 PROCEDURE — 87150 DNA/RNA AMPLIFIED PROBE: CPT | Performed by: EMERGENCY MEDICINE

## 2023-01-01 PROCEDURE — 25010000002 CALCIUM GLUCONATE 2-0.675 GM/100ML-% SOLUTION: Performed by: STUDENT IN AN ORGANIZED HEALTH CARE EDUCATION/TRAINING PROGRAM

## 2023-01-01 PROCEDURE — 25510000001 IOPAMIDOL PER 1 ML: Performed by: EMERGENCY MEDICINE

## 2023-01-01 PROCEDURE — 83880 ASSAY OF NATRIURETIC PEPTIDE: CPT | Performed by: EMERGENCY MEDICINE

## 2023-01-01 PROCEDURE — 80061 LIPID PANEL: CPT | Performed by: STUDENT IN AN ORGANIZED HEALTH CARE EDUCATION/TRAINING PROGRAM

## 2023-01-01 PROCEDURE — 82550 ASSAY OF CK (CPK): CPT

## 2023-01-01 PROCEDURE — 85384 FIBRINOGEN ACTIVITY: CPT

## 2023-01-01 PROCEDURE — 25010000002 POTASSIUM CHLORIDE 10 MEQ/100ML SOLUTION: Performed by: NURSE PRACTITIONER

## 2023-01-01 PROCEDURE — 87070 CULTURE OTHR SPECIMN AEROBIC: CPT | Performed by: STUDENT IN AN ORGANIZED HEALTH CARE EDUCATION/TRAINING PROGRAM

## 2023-01-01 PROCEDURE — 0 MIDAZOLAM 1 MG/ML 100ML NS 100 MG/100ML SOLUTION: Performed by: STUDENT IN AN ORGANIZED HEALTH CARE EDUCATION/TRAINING PROGRAM

## 2023-01-01 PROCEDURE — 80202 ASSAY OF VANCOMYCIN: CPT | Performed by: INTERNAL MEDICINE

## 2023-01-01 PROCEDURE — 93306 TTE W/DOPPLER COMPLETE: CPT | Performed by: INTERNAL MEDICINE

## 2023-01-01 PROCEDURE — 87340 HEPATITIS B SURFACE AG IA: CPT | Performed by: INTERNAL MEDICINE

## 2023-01-01 PROCEDURE — 25010000002 DOPAMINE PER 40 MG: Performed by: EMERGENCY MEDICINE

## 2023-01-01 PROCEDURE — 80047 BASIC METABLC PNL IONIZED CA: CPT

## 2023-01-01 PROCEDURE — 86900 BLOOD TYPING SEROLOGIC ABO: CPT | Performed by: INTERNAL MEDICINE

## 2023-01-01 PROCEDURE — 5A12012 PERFORMANCE OF CARDIAC OUTPUT, SINGLE, MANUAL: ICD-10-PCS

## 2023-01-01 PROCEDURE — 25010000002 AMIODARONE IN DEXTROSE 5% 360-4.14 MG/200ML-% SOLUTION: Performed by: EMERGENCY MEDICINE

## 2023-01-01 PROCEDURE — B514ZZA FLUOROSCOPY OF LEFT JUGULAR VEINS, GUIDANCE: ICD-10-PCS | Performed by: RADIOLOGY

## 2023-01-01 PROCEDURE — 25010000002 PIPERACILLIN SOD-TAZOBACTAM PER 1 G: Performed by: EMERGENCY MEDICINE

## 2023-01-01 PROCEDURE — 99231 SBSQ HOSP IP/OBS SF/LOW 25: CPT | Performed by: STUDENT IN AN ORGANIZED HEALTH CARE EDUCATION/TRAINING PROGRAM

## 2023-01-01 PROCEDURE — 85025 COMPLETE CBC W/AUTO DIFF WBC: CPT | Performed by: NURSE PRACTITIONER

## 2023-01-01 PROCEDURE — 0BH17EZ INSERTION OF ENDOTRACHEAL AIRWAY INTO TRACHEA, VIA NATURAL OR ARTIFICIAL OPENING: ICD-10-PCS | Performed by: EMERGENCY MEDICINE

## 2023-01-01 PROCEDURE — 36415 COLL VENOUS BLD VENIPUNCTURE: CPT

## 2023-01-01 PROCEDURE — 4A133J1 MONITORING OF ARTERIAL PULSE, PERIPHERAL, PERCUTANEOUS APPROACH: ICD-10-PCS

## 2023-01-01 PROCEDURE — 03HY32Z INSERTION OF MONITORING DEVICE INTO UPPER ARTERY, PERCUTANEOUS APPROACH: ICD-10-PCS

## 2023-01-01 PROCEDURE — 25810000003 SODIUM CHLORIDE 0.9 % SOLUTION: Performed by: NURSE PRACTITIONER

## 2023-01-01 PROCEDURE — 87040 BLOOD CULTURE FOR BACTERIA: CPT | Performed by: EMERGENCY MEDICINE

## 2023-01-01 RX ORDER — DEXMEDETOMIDINE HYDROCHLORIDE 4 UG/ML
.2-1.5 INJECTION, SOLUTION INTRAVENOUS
Status: DISCONTINUED | OUTPATIENT
Start: 2023-01-01 | End: 2023-01-01

## 2023-01-01 RX ORDER — POLYETHYLENE GLYCOL 3350 17 G/17G
17 POWDER, FOR SOLUTION ORAL DAILY PRN
Status: DISCONTINUED | OUTPATIENT
Start: 2023-01-01 | End: 2023-01-01

## 2023-01-01 RX ORDER — NITROGLYCERIN 0.4 MG/1
0.4 TABLET SUBLINGUAL
Status: DISCONTINUED | OUTPATIENT
Start: 2023-01-01 | End: 2023-01-01

## 2023-01-01 RX ORDER — SODIUM CHLORIDE AND POTASSIUM CHLORIDE 150; 900 MG/100ML; MG/100ML
50 INJECTION, SOLUTION INTRAVENOUS 2 TIMES DAILY
Status: DISCONTINUED | OUTPATIENT
Start: 2023-01-01 | End: 2023-01-01

## 2023-01-01 RX ORDER — ACETAMINOPHEN 325 MG/1
650 TABLET ORAL EVERY 4 HOURS PRN
Status: DISCONTINUED | OUTPATIENT
Start: 2023-01-01 | End: 2023-01-01

## 2023-01-01 RX ORDER — SODIUM CHLORIDE 0.9 % (FLUSH) 0.9 %
10 SYRINGE (ML) INJECTION AS NEEDED
Status: DISCONTINUED | OUTPATIENT
Start: 2023-01-01 | End: 2023-01-01 | Stop reason: SDUPTHER

## 2023-01-01 RX ORDER — MAGNESIUM SULFATE HEPTAHYDRATE 40 MG/ML
2 INJECTION, SOLUTION INTRAVENOUS ONCE
Status: COMPLETED | OUTPATIENT
Start: 2023-01-01 | End: 2023-01-01

## 2023-01-01 RX ORDER — SODIUM CHLORIDE 0.9 % (FLUSH) 0.9 %
10 SYRINGE (ML) INJECTION EVERY 12 HOURS SCHEDULED
Status: DISCONTINUED | OUTPATIENT
Start: 2023-01-01 | End: 2023-01-01

## 2023-01-01 RX ORDER — MIDODRINE HYDROCHLORIDE 5 MG/1
5 TABLET ORAL EVERY 8 HOURS
Status: DISCONTINUED | OUTPATIENT
Start: 2023-01-01 | End: 2023-01-01

## 2023-01-01 RX ORDER — CALCIUM CHLORIDE, MAGNESIUM CHLORIDE, DEXTROSE MONOHYDRATE, LACTIC ACID, SODIUM CHLORIDE, SODIUM BICARBONATE AND POTASSIUM CHLORIDE 5.15; 2.03; 22; 5.4; 6.46; 3.09; .157 G/L; G/L; G/L; G/L; G/L; G/L; G/L
2000 INJECTION INTRAVENOUS CONTINUOUS
Status: DISCONTINUED | OUTPATIENT
Start: 2023-01-01 | End: 2023-01-01

## 2023-01-01 RX ORDER — MIDODRINE HYDROCHLORIDE 5 MG/1
10 TABLET ORAL EVERY 8 HOURS
Status: DISCONTINUED | OUTPATIENT
Start: 2023-01-01 | End: 2023-01-01

## 2023-01-01 RX ORDER — ALPRAZOLAM 0.5 MG/1
0.5 TABLET ORAL 3 TIMES DAILY
Status: DISCONTINUED | OUTPATIENT
Start: 2023-01-01 | End: 2023-01-01

## 2023-01-01 RX ORDER — BISACODYL 10 MG
10 SUPPOSITORY, RECTAL RECTAL DAILY PRN
Status: DISCONTINUED | OUTPATIENT
Start: 2023-01-01 | End: 2023-01-01

## 2023-01-01 RX ORDER — SODIUM CHLORIDE 0.9 % (FLUSH) 0.9 %
10 SYRINGE (ML) INJECTION AS NEEDED
Status: DISCONTINUED | OUTPATIENT
Start: 2023-01-01 | End: 2023-01-01

## 2023-01-01 RX ORDER — CHLORHEXIDINE GLUCONATE ORAL RINSE 1.2 MG/ML
15 SOLUTION DENTAL EVERY 12 HOURS SCHEDULED
Status: DISCONTINUED | OUTPATIENT
Start: 2023-01-01 | End: 2023-01-01

## 2023-01-01 RX ORDER — BISACODYL 5 MG/1
5 TABLET, DELAYED RELEASE ORAL DAILY PRN
Status: DISCONTINUED | OUTPATIENT
Start: 2023-01-01 | End: 2023-01-01

## 2023-01-01 RX ORDER — HEPARIN SODIUM 1000 [USP'U]/ML
INJECTION, SOLUTION INTRAVENOUS; SUBCUTANEOUS AS NEEDED
Status: DISCONTINUED | OUTPATIENT
Start: 2023-01-01 | End: 2023-01-01

## 2023-01-01 RX ORDER — POTASSIUM CHLORIDE 1.5 G/1.58G
40 POWDER, FOR SOLUTION ORAL EVERY 4 HOURS
Status: DISCONTINUED | OUTPATIENT
Start: 2023-01-01 | End: 2023-01-01

## 2023-01-01 RX ORDER — VECURONIUM BROMIDE 1 MG/ML
INJECTION, POWDER, LYOPHILIZED, FOR SOLUTION INTRAVENOUS
Status: COMPLETED
Start: 2023-01-01 | End: 2023-01-01

## 2023-01-01 RX ORDER — FENTANYL/ROPIVACAINE/NS/PF 2-625MCG/1
15 PLASTIC BAG, INJECTION (ML) EPIDURAL ONCE
Qty: 250 ML | Refills: 0 | Status: COMPLETED | OUTPATIENT
Start: 2023-01-01 | End: 2023-01-01

## 2023-01-01 RX ORDER — SODIUM CHLORIDE 9 MG/ML
40 INJECTION, SOLUTION INTRAVENOUS AS NEEDED
Status: DISCONTINUED | OUTPATIENT
Start: 2023-01-01 | End: 2023-01-01 | Stop reason: SDUPTHER

## 2023-01-01 RX ORDER — METRONIDAZOLE 500 MG/100ML
500 INJECTION, SOLUTION INTRAVENOUS EVERY 8 HOURS
Status: DISCONTINUED | OUTPATIENT
Start: 2023-01-01 | End: 2023-01-01

## 2023-01-01 RX ORDER — AMOXICILLIN 250 MG
2 CAPSULE ORAL 2 TIMES DAILY
Status: DISCONTINUED | OUTPATIENT
Start: 2023-01-01 | End: 2023-01-01

## 2023-01-01 RX ORDER — VANCOMYCIN 1.75 GRAM/500 ML IN 0.9 % SODIUM CHLORIDE INTRAVENOUS
1750 ONCE
Status: COMPLETED | OUTPATIENT
Start: 2023-01-01 | End: 2023-01-01

## 2023-01-01 RX ORDER — MIDAZOLAM HYDROCHLORIDE 1 MG/ML
2 INJECTION INTRAMUSCULAR; INTRAVENOUS ONCE
Status: COMPLETED | OUTPATIENT
Start: 2023-01-01 | End: 2023-01-01

## 2023-01-01 RX ORDER — MIDODRINE HYDROCHLORIDE 2.5 MG/1
2.5 TABLET ORAL
Status: DISCONTINUED | OUTPATIENT
Start: 2023-01-01 | End: 2023-01-01

## 2023-01-01 RX ORDER — LEVETIRACETAM 500 MG/5ML
750 INJECTION, SOLUTION, CONCENTRATE INTRAVENOUS EVERY 12 HOURS SCHEDULED
Status: DISCONTINUED | OUTPATIENT
Start: 2023-01-01 | End: 2023-01-01

## 2023-01-01 RX ORDER — SODIUM PHOSPHATE IN 0.9 % NACL 15MMOL/100
15 PLASTIC BAG, INJECTION (ML) INTRAVENOUS ONCE
Status: COMPLETED | OUTPATIENT
Start: 2023-01-01 | End: 2023-01-01

## 2023-01-01 RX ORDER — PANTOPRAZOLE SODIUM 40 MG/10ML
40 INJECTION, POWDER, LYOPHILIZED, FOR SOLUTION INTRAVENOUS EVERY 12 HOURS SCHEDULED
Status: DISCONTINUED | OUTPATIENT
Start: 2023-01-01 | End: 2023-01-01

## 2023-01-01 RX ORDER — DOBUTAMINE HYDROCHLORIDE 100 MG/100ML
5 INJECTION INTRAVENOUS CONTINUOUS
Status: DISCONTINUED | OUTPATIENT
Start: 2023-01-01 | End: 2023-01-01

## 2023-01-01 RX ORDER — EPOPROSTENOL SODIUM 1.5 MG/1
50 INJECTION, POWDER, FOR SOLUTION INTRAVENOUS CONTINUOUS
Status: DISCONTINUED | OUTPATIENT
Start: 2023-01-01 | End: 2023-01-01

## 2023-01-01 RX ORDER — POTASSIUM CHLORIDE 29.8 MG/ML
20 INJECTION INTRAVENOUS
Status: COMPLETED | OUTPATIENT
Start: 2023-01-01 | End: 2023-01-01

## 2023-01-01 RX ORDER — FENTANYL/ROPIVACAINE/NS/PF 2-625MCG/1
15 PLASTIC BAG, INJECTION (ML) EPIDURAL ONCE
Status: COMPLETED | OUTPATIENT
Start: 2023-01-01 | End: 2023-01-01

## 2023-01-01 RX ORDER — MIDODRINE HYDROCHLORIDE 5 MG/1
5 TABLET ORAL
Status: DISCONTINUED | OUTPATIENT
Start: 2023-01-01 | End: 2023-01-01

## 2023-01-01 RX ORDER — SODIUM CHLORIDE 9 MG/ML
INJECTION, SOLUTION INTRAVENOUS CONTINUOUS PRN
Status: DISCONTINUED | OUTPATIENT
Start: 2023-01-01 | End: 2023-01-01 | Stop reason: SURG

## 2023-01-01 RX ORDER — DOPAMINE HYDROCHLORIDE 160 MG/100ML
INJECTION, SOLUTION INTRAVENOUS
Status: COMPLETED | OUTPATIENT
Start: 2023-01-01 | End: 2023-01-01

## 2023-01-01 RX ORDER — DEXTROSE MONOHYDRATE 25 G/50ML
25 INJECTION, SOLUTION INTRAVENOUS
Status: DISCONTINUED | OUTPATIENT
Start: 2023-01-01 | End: 2023-01-01

## 2023-01-01 RX ORDER — MIDAZOLAM IN NACL,ISO-OSMOT/PF 50 MG/50ML
1-10 INFUSION BOTTLE (ML) INTRAVENOUS
Status: DISCONTINUED | OUTPATIENT
Start: 2023-01-01 | End: 2023-01-01

## 2023-01-01 RX ORDER — POTASSIUM CHLORIDE 29.8 MG/ML
20 INJECTION INTRAVENOUS ONCE
Status: COMPLETED | OUTPATIENT
Start: 2023-01-01 | End: 2023-01-01

## 2023-01-01 RX ORDER — ALBUMIN (HUMAN) 12.5 G/50ML
25 SOLUTION INTRAVENOUS ONCE
Status: COMPLETED | OUTPATIENT
Start: 2023-01-01 | End: 2023-01-01

## 2023-01-01 RX ORDER — CALCIUM CHLORIDE, MAGNESIUM CHLORIDE, SODIUM CHLORIDE, SODIUM BICARBONATE, POTASSIUM CHLORIDE AND SODIUM PHOSPHATE DIBASIC DIHYDRATE 3.68; 3.05; 6.34; 3.09; .314; .187 G/L; G/L; G/L; G/L; G/L; G/L
2000 INJECTION INTRAVENOUS CONTINUOUS
Status: DISCONTINUED | OUTPATIENT
Start: 2023-01-01 | End: 2023-01-01

## 2023-01-01 RX ORDER — SODIUM CHLORIDE 0.9 % (FLUSH) 0.9 %
20 SYRINGE (ML) INJECTION AS NEEDED
Status: DISCONTINUED | OUTPATIENT
Start: 2023-01-01 | End: 2023-01-01

## 2023-01-01 RX ORDER — ONDANSETRON 4 MG/1
4 TABLET, FILM COATED ORAL EVERY 6 HOURS PRN
Status: DISCONTINUED | OUTPATIENT
Start: 2023-01-01 | End: 2023-01-01

## 2023-01-01 RX ORDER — VECURONIUM BROMIDE 1 MG/ML
10 INJECTION, POWDER, LYOPHILIZED, FOR SOLUTION INTRAVENOUS ONCE
Status: COMPLETED | OUTPATIENT
Start: 2023-01-01 | End: 2023-01-01

## 2023-01-01 RX ORDER — AMOXICILLIN 250 MG
2 CAPSULE ORAL 2 TIMES DAILY PRN
Status: DISCONTINUED | OUTPATIENT
Start: 2023-01-01 | End: 2023-01-01

## 2023-01-01 RX ORDER — INSULIN LISPRO 100 [IU]/ML
2-7 INJECTION, SOLUTION INTRAVENOUS; SUBCUTANEOUS EVERY 6 HOURS SCHEDULED
Status: DISCONTINUED | OUTPATIENT
Start: 2023-01-01 | End: 2023-01-01

## 2023-01-01 RX ORDER — LEVETIRACETAM 500 MG/5ML
1000 INJECTION, SOLUTION, CONCENTRATE INTRAVENOUS ONCE
Status: COMPLETED | OUTPATIENT
Start: 2023-01-01 | End: 2023-01-01

## 2023-01-01 RX ORDER — PANTOPRAZOLE SODIUM 40 MG/10ML
40 INJECTION, POWDER, LYOPHILIZED, FOR SOLUTION INTRAVENOUS
Status: DISCONTINUED | OUTPATIENT
Start: 2023-01-01 | End: 2023-01-01

## 2023-01-01 RX ORDER — IBUPROFEN 600 MG/1
1 TABLET ORAL
Status: DISCONTINUED | OUTPATIENT
Start: 2023-01-01 | End: 2023-01-01

## 2023-01-01 RX ORDER — NICOTINE POLACRILEX 4 MG
15 LOZENGE BUCCAL
Status: DISCONTINUED | OUTPATIENT
Start: 2023-01-01 | End: 2023-01-01

## 2023-01-01 RX ORDER — METOCLOPRAMIDE HYDROCHLORIDE 5 MG/ML
10 INJECTION INTRAMUSCULAR; INTRAVENOUS EVERY 6 HOURS
Status: DISCONTINUED | OUTPATIENT
Start: 2023-01-01 | End: 2023-01-01

## 2023-01-01 RX ORDER — DEXTROSE MONOHYDRATE 100 MG/ML
25 INJECTION, SOLUTION INTRAVENOUS CONTINUOUS
Status: DISCONTINUED | OUTPATIENT
Start: 2023-01-01 | End: 2023-01-01

## 2023-01-01 RX ORDER — VECURONIUM BROMIDE 1 MG/ML
INJECTION, POWDER, LYOPHILIZED, FOR SOLUTION INTRAVENOUS AS NEEDED
Status: DISCONTINUED | OUTPATIENT
Start: 2023-01-01 | End: 2023-01-01 | Stop reason: SURG

## 2023-01-01 RX ORDER — SODIUM CHLORIDE 9 MG/ML
40 INJECTION, SOLUTION INTRAVENOUS AS NEEDED
Status: DISCONTINUED | OUTPATIENT
Start: 2023-01-01 | End: 2023-01-01

## 2023-01-01 RX ORDER — SODIUM CHLORIDE 0.9 % (FLUSH) 0.9 %
10 SYRINGE (ML) INJECTION EVERY 12 HOURS SCHEDULED
Status: DISCONTINUED | OUTPATIENT
Start: 2023-01-01 | End: 2023-01-01 | Stop reason: SDUPTHER

## 2023-01-01 RX ORDER — ACETAMINOPHEN 650 MG/1
650 SUPPOSITORY RECTAL EVERY 4 HOURS PRN
Status: DISCONTINUED | OUTPATIENT
Start: 2023-01-01 | End: 2023-01-01

## 2023-01-01 RX ORDER — SODIUM PHOSPHATE IN 0.9 % NACL 15MMOL/100
15 PLASTIC BAG, INJECTION (ML) INTRAVENOUS ONCE
Qty: 100 ML | Refills: 0 | Status: COMPLETED | OUTPATIENT
Start: 2023-01-01 | End: 2023-01-01

## 2023-01-01 RX ORDER — POTASSIUM CHLORIDE 7.45 MG/ML
10 INJECTION INTRAVENOUS
Status: COMPLETED | OUTPATIENT
Start: 2023-01-01 | End: 2023-01-01

## 2023-01-01 RX ORDER — CALCIUM GLUCONATE 20 MG/ML
1000 INJECTION, SOLUTION INTRAVENOUS ONCE
Status: COMPLETED | OUTPATIENT
Start: 2023-01-01 | End: 2023-01-01

## 2023-01-01 RX ORDER — FUROSEMIDE 10 MG/ML
40 INJECTION INTRAMUSCULAR; INTRAVENOUS ONCE
Status: COMPLETED | OUTPATIENT
Start: 2023-01-01 | End: 2023-01-01

## 2023-01-01 RX ORDER — ONDANSETRON 2 MG/ML
4 INJECTION INTRAMUSCULAR; INTRAVENOUS EVERY 6 HOURS PRN
Status: DISCONTINUED | OUTPATIENT
Start: 2023-01-01 | End: 2023-01-01

## 2023-01-01 RX ORDER — METHOCARBAMOL 750 MG/1
750 TABLET, FILM COATED ORAL EVERY 8 HOURS PRN
Status: ON HOLD | COMMUNITY
Start: 2023-01-01

## 2023-01-01 RX ORDER — ALPRAZOLAM 0.5 MG/1
0.5 TABLET ORAL 4 TIMES DAILY PRN
Status: DISCONTINUED | OUTPATIENT
Start: 2023-01-01 | End: 2023-01-01

## 2023-01-01 RX ORDER — LIDOCAINE HYDROCHLORIDE 10 MG/ML
INJECTION, SOLUTION INFILTRATION; PERINEURAL AS NEEDED
Status: COMPLETED | OUTPATIENT
Start: 2023-01-01 | End: 2023-01-01

## 2023-01-01 RX ORDER — LANSOPRAZOLE 30 MG/1
30 TABLET, ORALLY DISINTEGRATING, DELAYED RELEASE ORAL DAILY
Status: DISCONTINUED | OUTPATIENT
Start: 2023-01-01 | End: 2023-01-01

## 2023-01-01 RX ORDER — POTASSIUM CHLORIDE 7.45 MG/ML
INJECTION INTRAVENOUS ONCE
Status: CANCELLED | OUTPATIENT
Start: 2023-01-01 | End: 2023-01-01

## 2023-01-01 RX ORDER — CALCIUM GLUCONATE 20 MG/ML
2000 INJECTION, SOLUTION INTRAVENOUS ONCE
Status: COMPLETED | OUTPATIENT
Start: 2023-01-01 | End: 2023-01-01

## 2023-01-01 RX ORDER — FENTANYL CITRATE 50 UG/ML
INJECTION, SOLUTION INTRAMUSCULAR; INTRAVENOUS
Status: COMPLETED
Start: 2023-01-01 | End: 2023-01-01

## 2023-01-01 RX ORDER — NOREPINEPHRINE BITARTRATE 0.03 MG/ML
.02-.5 INJECTION, SOLUTION INTRAVENOUS
Status: DISCONTINUED | OUTPATIENT
Start: 2023-01-01 | End: 2023-01-01

## 2023-01-01 RX ORDER — FENTANYL CITRATE-0.9 % NACL/PF 10 MCG/ML
50-300 PLASTIC BAG, INJECTION (ML) INTRAVENOUS
Status: DISCONTINUED | OUTPATIENT
Start: 2023-01-01 | End: 2023-01-01

## 2023-01-01 RX ORDER — POTASSIUM CHLORIDE 7.45 MG/ML
10 INJECTION INTRAVENOUS
Status: DISPENSED | OUTPATIENT
Start: 2023-01-01 | End: 2023-01-01

## 2023-01-01 RX ORDER — AMIODARONE HYDROCHLORIDE 50 MG/ML
INJECTION, SOLUTION INTRAVENOUS
Status: COMPLETED | OUTPATIENT
Start: 2023-01-01 | End: 2023-01-01

## 2023-01-01 RX ORDER — HEPARIN SODIUM 5000 [USP'U]/ML
5000 INJECTION, SOLUTION INTRAVENOUS; SUBCUTANEOUS EVERY 8 HOURS SCHEDULED
Status: DISCONTINUED | OUTPATIENT
Start: 2023-01-01 | End: 2023-01-01

## 2023-01-01 RX ORDER — FENTANYL CITRATE 50 UG/ML
50 INJECTION, SOLUTION INTRAMUSCULAR; INTRAVENOUS
Status: DISCONTINUED | OUTPATIENT
Start: 2023-01-01 | End: 2023-01-01

## 2023-01-01 RX ORDER — IPRATROPIUM BROMIDE AND ALBUTEROL SULFATE 2.5; .5 MG/3ML; MG/3ML
3 SOLUTION RESPIRATORY (INHALATION) EVERY 6 HOURS PRN
Status: DISCONTINUED | OUTPATIENT
Start: 2023-01-01 | End: 2023-01-01

## 2023-01-01 RX ORDER — NOREPINEPHRINE BITARTRATE/D5W 8 MG/250ML
PLASTIC BAG, INJECTION (ML) INTRAVENOUS CONTINUOUS PRN
Status: DISCONTINUED | OUTPATIENT
Start: 2023-01-01 | End: 2023-01-01 | Stop reason: SURG

## 2023-01-01 RX ADMIN — CALCIUM CHLORIDE, MAGNESIUM CHLORIDE, DEXTROSE MONOHYDRATE, LACTIC ACID, SODIUM CHLORIDE, SODIUM BICARBONATE AND POTASSIUM CHLORIDE 2000 ML/HR: 5.15; 2.03; 22; 5.4; 6.46; 3.09; .157 INJECTION INTRAVENOUS at 03:12

## 2023-01-01 RX ADMIN — VECURONIUM BROMIDE 0.6 MCG/KG/MIN: 1 INJECTION, POWDER, LYOPHILIZED, FOR SOLUTION INTRAVENOUS at 04:10

## 2023-01-01 RX ADMIN — ALPRAZOLAM 0.5 MG: 0.5 TABLET ORAL at 15:39

## 2023-01-01 RX ADMIN — LEVETIRACETAM 750 MG: 100 INJECTION, SOLUTION INTRAVENOUS at 08:33

## 2023-01-01 RX ADMIN — SODIUM BICARBONATE 50 MEQ: 84 INJECTION INTRAVENOUS at 11:28

## 2023-01-01 RX ADMIN — Medication 0.03 MCG/KG/MIN: at 05:17

## 2023-01-01 RX ADMIN — SODIUM CHLORIDE 2160 ML: 9 INJECTION, SOLUTION INTRAVENOUS at 00:24

## 2023-01-01 RX ADMIN — CEFEPIME 2000 MG: 2 INJECTION, POWDER, FOR SOLUTION INTRAVENOUS at 04:44

## 2023-01-01 RX ADMIN — PANTOPRAZOLE SODIUM 40 MG: 40 INJECTION, POWDER, LYOPHILIZED, FOR SOLUTION INTRAVENOUS at 08:35

## 2023-01-01 RX ADMIN — POTASSIUM PHOSPHATE, MONOBASIC POTASSIUM PHOSPHATE, DIBASIC 15 MMOL: 224; 236 INJECTION, SOLUTION, CONCENTRATE INTRAVENOUS at 18:41

## 2023-01-01 RX ADMIN — FOMEPIZOLE 750 MG: 1 INJECTION, SOLUTION INTRAVENOUS at 06:15

## 2023-01-01 RX ADMIN — CALCIUM CHLORIDE, MAGNESIUM CHLORIDE, SODIUM CHLORIDE, SODIUM BICARBONATE, POTASSIUM CHLORIDE AND SODIUM PHOSPHATE DIBASIC DIHYDRATE 2000 ML/HR: 3.68; 3.05; 6.34; 3.09; .314; .187 INJECTION INTRAVENOUS at 12:20

## 2023-01-01 RX ADMIN — SODIUM PHOSPHATE, MONOBASIC, MONOHYDRATE AND SODIUM PHOSPHATE, DIBASIC, ANHYDROUS 15 MMOL: 142; 276 INJECTION, SOLUTION INTRAVENOUS at 12:58

## 2023-01-01 RX ADMIN — SODIUM BICARBONATE 150 MEQ: 84 INJECTION INTRAVENOUS at 05:01

## 2023-01-01 RX ADMIN — DEXTROSE MONOHYDRATE 25 G: 25 INJECTION, SOLUTION INTRAVENOUS at 06:24

## 2023-01-01 RX ADMIN — MIDODRINE HYDROCHLORIDE 10 MG: 5 TABLET ORAL at 05:31

## 2023-01-01 RX ADMIN — Medication 10 ML: at 09:49

## 2023-01-01 RX ADMIN — CALCIUM CHLORIDE, MAGNESIUM CHLORIDE, DEXTROSE MONOHYDRATE, LACTIC ACID, SODIUM CHLORIDE, SODIUM BICARBONATE AND POTASSIUM CHLORIDE 2000 ML/HR: 5.15; 2.03; 22; 5.4; 6.46; 3.09; .157 INJECTION INTRAVENOUS at 07:51

## 2023-01-01 RX ADMIN — PANTOPRAZOLE SODIUM 40 MG: 40 INJECTION, POWDER, LYOPHILIZED, FOR SOLUTION INTRAVENOUS at 20:16

## 2023-01-01 RX ADMIN — POTASSIUM PHOSPHATE, MONOBASIC POTASSIUM PHOSPHATE, DIBASIC 15 MMOL: 224; 236 INJECTION, SOLUTION, CONCENTRATE INTRAVENOUS at 22:29

## 2023-01-01 RX ADMIN — LEVETIRACETAM 750 MG: 100 INJECTION, SOLUTION INTRAVENOUS at 11:34

## 2023-01-01 RX ADMIN — Medication 1750 MG: at 17:43

## 2023-01-01 RX ADMIN — PANTOPRAZOLE SODIUM 40 MG: 40 INJECTION, POWDER, LYOPHILIZED, FOR SOLUTION INTRAVENOUS at 20:05

## 2023-01-01 RX ADMIN — CALCIUM CHLORIDE, MAGNESIUM CHLORIDE, DEXTROSE MONOHYDRATE, LACTIC ACID, SODIUM CHLORIDE, SODIUM BICARBONATE AND POTASSIUM CHLORIDE 2000 ML/HR: 5.15; 2.03; 22; 5.4; 6.46; 3.09; .157 INJECTION INTRAVENOUS at 03:13

## 2023-01-01 RX ADMIN — Medication 10 ML: at 20:29

## 2023-01-01 RX ADMIN — METOCLOPRAMIDE 10 MG: 5 INJECTION, SOLUTION INTRAMUSCULAR; INTRAVENOUS at 16:08

## 2023-01-01 RX ADMIN — DEXTROSE MONOHYDRATE 50 ML/HR: 100 INJECTION, SOLUTION INTRAVENOUS at 07:01

## 2023-01-01 RX ADMIN — DOBUTAMINE HYDROCHLORIDE 5 MCG/KG/MIN: 100 INJECTION INTRAVENOUS at 00:21

## 2023-01-01 RX ADMIN — HEPARIN SODIUM 4500 UNITS: 1000 INJECTION INTRAVENOUS; SUBCUTANEOUS at 11:17

## 2023-01-01 RX ADMIN — METRONIDAZOLE 500 MG: 500 INJECTION, SOLUTION INTRAVENOUS at 04:45

## 2023-01-01 RX ADMIN — EPINEPHRINE 1 MG: 0.1 INJECTION, SOLUTION ENDOTRACHEAL; INTRACARDIAC; INTRAVENOUS at 23:14

## 2023-01-01 RX ADMIN — LEVETIRACETAM 750 MG: 100 INJECTION, SOLUTION INTRAVENOUS at 20:27

## 2023-01-01 RX ADMIN — DOPAMINE HYDROCHLORIDE 5 MCG/KG/MIN: 160 INJECTION, SOLUTION INTRAVENOUS at 22:32

## 2023-01-01 RX ADMIN — DOCUSATE SODIUM 50MG AND SENNOSIDES 8.6MG 2 TABLET: 8.6; 5 TABLET, FILM COATED ORAL at 21:58

## 2023-01-01 RX ADMIN — CALCIUM CHLORIDE, MAGNESIUM CHLORIDE, DEXTROSE MONOHYDRATE, LACTIC ACID, SODIUM CHLORIDE, SODIUM BICARBONATE AND POTASSIUM CHLORIDE 2000 ML/HR: 5.15; 2.03; 22; 5.4; 6.46; 3.09; .157 INJECTION INTRAVENOUS at 21:44

## 2023-01-01 RX ADMIN — CHLORHEXIDINE GLUCONATE 15 ML: 1.2 RINSE ORAL at 20:49

## 2023-01-01 RX ADMIN — Medication 10 ML: at 08:45

## 2023-01-01 RX ADMIN — ACETAMINOPHEN 650 MG: 325 TABLET, FILM COATED ORAL at 05:26

## 2023-01-01 RX ADMIN — METRONIDAZOLE 500 MG: 500 INJECTION, SOLUTION INTRAVENOUS at 20:31

## 2023-01-01 RX ADMIN — CALCIUM CHLORIDE, MAGNESIUM CHLORIDE, DEXTROSE MONOHYDRATE, LACTIC ACID, SODIUM CHLORIDE, SODIUM BICARBONATE AND POTASSIUM CHLORIDE 2000 ML/HR: 5.15; 2.03; 22; 5.4; 6.46; 3.09; .157 INJECTION INTRAVENOUS at 01:10

## 2023-01-01 RX ADMIN — POTASSIUM CHLORIDE 20 MEQ: 29.8 INJECTION, SOLUTION INTRAVENOUS at 06:17

## 2023-01-01 RX ADMIN — METOCLOPRAMIDE 10 MG: 5 INJECTION, SOLUTION INTRAMUSCULAR; INTRAVENOUS at 20:16

## 2023-01-01 RX ADMIN — Medication 10 ML: at 20:53

## 2023-01-01 RX ADMIN — CALCIUM CHLORIDE, MAGNESIUM CHLORIDE, SODIUM CHLORIDE, SODIUM BICARBONATE, POTASSIUM CHLORIDE AND SODIUM PHOSPHATE DIBASIC DIHYDRATE 2000 ML/HR: 3.68; 3.05; 6.34; 3.09; .314; .187 INJECTION INTRAVENOUS at 14:49

## 2023-01-01 RX ADMIN — EPOPROSTENOL 50 NG/KG/MIN: 1.5 INJECTION, POWDER, LYOPHILIZED, FOR SOLUTION INTRAVENOUS at 05:09

## 2023-01-01 RX ADMIN — PIPERACILLIN AND TAZOBACTAM 3.38 G: 3; .375 INJECTION, POWDER, FOR SOLUTION INTRAVENOUS at 17:40

## 2023-01-01 RX ADMIN — MIDODRINE HYDROCHLORIDE 2.5 MG: 2.5 TABLET ORAL at 08:33

## 2023-01-01 RX ADMIN — DOBUTAMINE HYDROCHLORIDE 5 MCG/KG/MIN: 100 INJECTION INTRAVENOUS at 12:51

## 2023-01-01 RX ADMIN — SODIUM PHOSPHATE, MONOBASIC, MONOHYDRATE AND SODIUM PHOSPHATE, DIBASIC, ANHYDROUS 15 MMOL: 142; 276 INJECTION, SOLUTION INTRAVENOUS at 05:26

## 2023-01-01 RX ADMIN — LANSOPRAZOLE 15 MG: 15 TABLET, ORALLY DISINTEGRATING, DELAYED RELEASE ORAL at 08:33

## 2023-01-01 RX ADMIN — DEXMEDETOMIDINE HYDROCHLORIDE 1.4 MCG/KG/HR: 4 INJECTION, SOLUTION INTRAVENOUS at 10:18

## 2023-01-01 RX ADMIN — POTASSIUM CHLORIDE 20 MEQ: 29.8 INJECTION, SOLUTION INTRAVENOUS at 04:58

## 2023-01-01 RX ADMIN — CEFEPIME 2000 MG: 2 INJECTION, POWDER, FOR SOLUTION INTRAVENOUS at 20:15

## 2023-01-01 RX ADMIN — DEXMEDETOMIDINE HYDROCHLORIDE 1.5 MCG/KG/HR: 4 INJECTION, SOLUTION INTRAVENOUS at 17:40

## 2023-01-01 RX ADMIN — INSULIN LISPRO 3 UNITS: 100 INJECTION, SOLUTION INTRAVENOUS; SUBCUTANEOUS at 02:44

## 2023-01-01 RX ADMIN — ATROPINE SULFATE 1 MG: 0.1 INJECTION PARENTERAL at 23:30

## 2023-01-01 RX ADMIN — PANTOPRAZOLE SODIUM 40 MG: 40 INJECTION, POWDER, LYOPHILIZED, FOR SOLUTION INTRAVENOUS at 08:30

## 2023-01-01 RX ADMIN — POTASSIUM CHLORIDE 10 MEQ: 7.46 INJECTION, SOLUTION INTRAVENOUS at 02:30

## 2023-01-01 RX ADMIN — DEXTROSE MONOHYDRATE 25 G: 25 INJECTION, SOLUTION INTRAVENOUS at 08:23

## 2023-01-01 RX ADMIN — CHLORHEXIDINE GLUCONATE 15 ML: 1.2 RINSE ORAL at 08:04

## 2023-01-01 RX ADMIN — DEXMEDETOMIDINE HYDROCHLORIDE 1.5 MCG/KG/HR: 4 INJECTION, SOLUTION INTRAVENOUS at 16:25

## 2023-01-01 RX ADMIN — CALCIUM CHLORIDE, MAGNESIUM CHLORIDE, DEXTROSE MONOHYDRATE, LACTIC ACID, SODIUM CHLORIDE, SODIUM BICARBONATE AND POTASSIUM CHLORIDE 2000 ML/HR: 5.15; 2.03; 22; 5.4; 6.46; 3.09; .157 INJECTION INTRAVENOUS at 05:07

## 2023-01-01 RX ADMIN — Medication 0.3 MCG/KG/MIN: at 14:33

## 2023-01-01 RX ADMIN — Medication 10 ML: at 20:25

## 2023-01-01 RX ADMIN — DOCUSATE SODIUM 50MG AND SENNOSIDES 8.6MG 2 TABLET: 8.6; 5 TABLET, FILM COATED ORAL at 09:49

## 2023-01-01 RX ADMIN — Medication 10 ML: at 09:10

## 2023-01-01 RX ADMIN — AMIODARONE HYDROCHLORIDE 300 MG: 50 INJECTION, SOLUTION INTRAVENOUS at 23:15

## 2023-01-01 RX ADMIN — MIDODRINE HYDROCHLORIDE 2.5 MG: 2.5 TABLET ORAL at 17:40

## 2023-01-01 RX ADMIN — CALCIUM GLUCONATE 2000 MG: 20 INJECTION, SOLUTION INTRAVENOUS at 02:44

## 2023-01-01 RX ADMIN — POTASSIUM CHLORIDE 20 MEQ: 29.8 INJECTION, SOLUTION INTRAVENOUS at 17:51

## 2023-01-01 RX ADMIN — MIDODRINE HYDROCHLORIDE 10 MG: 5 TABLET ORAL at 14:04

## 2023-01-01 RX ADMIN — DEXMEDETOMIDINE HYDROCHLORIDE 1.5 MCG/KG/HR: 4 INJECTION, SOLUTION INTRAVENOUS at 15:30

## 2023-01-01 RX ADMIN — METOCLOPRAMIDE 10 MG: 5 INJECTION, SOLUTION INTRAMUSCULAR; INTRAVENOUS at 08:30

## 2023-01-01 RX ADMIN — METRONIDAZOLE 500 MG: 500 INJECTION, SOLUTION INTRAVENOUS at 04:35

## 2023-01-01 RX ADMIN — DEXTROSE MONOHYDRATE 50 ML/HR: 100 INJECTION, SOLUTION INTRAVENOUS at 11:49

## 2023-01-01 RX ADMIN — VASOPRESSIN 0.06 UNITS/MIN: 0.2 INJECTION INTRAVENOUS at 03:12

## 2023-01-01 RX ADMIN — KIT FOR THE PREPARATION OF TECHNETIUM TC 99M PENTETATE 1 DOSE: 20 INJECTION, POWDER, LYOPHILIZED, FOR SOLUTION INTRAVENOUS; RESPIRATORY (INHALATION) at 13:36

## 2023-01-01 RX ADMIN — Medication 10 ML: at 21:09

## 2023-01-01 RX ADMIN — IOPAMIDOL 100 ML: 755 INJECTION, SOLUTION INTRAVENOUS at 00:15

## 2023-01-01 RX ADMIN — Medication 0.3 MCG/KG/MIN: at 03:12

## 2023-01-01 RX ADMIN — DEXMEDETOMIDINE HYDROCHLORIDE 1.4 MCG/KG/HR: 4 INJECTION, SOLUTION INTRAVENOUS at 03:12

## 2023-01-01 RX ADMIN — Medication 10 ML: at 08:16

## 2023-01-01 RX ADMIN — PANTOPRAZOLE SODIUM 40 MG: 40 INJECTION, POWDER, LYOPHILIZED, FOR SOLUTION INTRAVENOUS at 08:34

## 2023-01-01 RX ADMIN — Medication 10 ML: at 20:06

## 2023-01-01 RX ADMIN — PIPERACILLIN AND TAZOBACTAM 3.38 G: 3; .375 INJECTION, POWDER, FOR SOLUTION INTRAVENOUS at 09:07

## 2023-01-01 RX ADMIN — SODIUM BICARBONATE 150 MEQ: 84 INJECTION INTRAVENOUS at 06:44

## 2023-01-01 RX ADMIN — PIPERACILLIN AND TAZOBACTAM 4.5 G: 4; .5 INJECTION, POWDER, FOR SOLUTION INTRAVENOUS at 21:00

## 2023-01-01 RX ADMIN — Medication 10 ML: at 11:50

## 2023-01-01 RX ADMIN — CALCIUM CHLORIDE, MAGNESIUM CHLORIDE, SODIUM CHLORIDE, SODIUM BICARBONATE, POTASSIUM CHLORIDE AND SODIUM PHOSPHATE DIBASIC DIHYDRATE 2000 ML/HR: 3.68; 3.05; 6.34; 3.09; .314; .187 INJECTION INTRAVENOUS at 14:48

## 2023-01-01 RX ADMIN — PIPERACILLIN AND TAZOBACTAM 3.38 G: 3; .375 INJECTION, POWDER, FOR SOLUTION INTRAVENOUS at 05:28

## 2023-01-01 RX ADMIN — CHLORHEXIDINE GLUCONATE 15 ML: 1.2 RINSE ORAL at 20:06

## 2023-01-01 RX ADMIN — WHITE PETROLATUM 57.7 %-MINERAL OIL 31.9 % EYE OINTMENT: at 03:47

## 2023-01-01 RX ADMIN — CHLORHEXIDINE GLUCONATE 15 ML: 1.2 RINSE ORAL at 02:25

## 2023-01-01 RX ADMIN — DEXTROSE MONOHYDRATE 50 ML/HR: 100 INJECTION, SOLUTION INTRAVENOUS at 10:03

## 2023-01-01 RX ADMIN — Medication 10 ML: at 02:25

## 2023-01-01 RX ADMIN — DEXMEDETOMIDINE HYDROCHLORIDE 1.5 MCG/KG/HR: 4 INJECTION, SOLUTION INTRAVENOUS at 07:50

## 2023-01-01 RX ADMIN — DEXTROSE MONOHYDRATE 50 ML/HR: 100 INJECTION, SOLUTION INTRAVENOUS at 08:01

## 2023-01-01 RX ADMIN — POTASSIUM CHLORIDE 10 MEQ: 7.46 INJECTION, SOLUTION INTRAVENOUS at 04:11

## 2023-01-01 RX ADMIN — DEXMEDETOMIDINE HYDROCHLORIDE 1.5 MCG/KG/HR: 4 INJECTION, SOLUTION INTRAVENOUS at 06:19

## 2023-01-01 RX ADMIN — PIPERACILLIN AND TAZOBACTAM 3.38 G: 3; .375 INJECTION, POWDER, FOR SOLUTION INTRAVENOUS at 20:30

## 2023-01-01 RX ADMIN — DEXMEDETOMIDINE HYDROCHLORIDE 1.5 MCG/KG/HR: 4 INJECTION, SOLUTION INTRAVENOUS at 20:05

## 2023-01-01 RX ADMIN — CHLORHEXIDINE GLUCONATE 15 ML: 1.2 RINSE ORAL at 08:34

## 2023-01-01 RX ADMIN — CALCIUM CHLORIDE, MAGNESIUM CHLORIDE, DEXTROSE MONOHYDRATE, LACTIC ACID, SODIUM CHLORIDE, SODIUM BICARBONATE AND POTASSIUM CHLORIDE 2000 ML/HR: 5.15; 2.03; 22; 5.4; 6.46; 3.09; .157 INJECTION INTRAVENOUS at 16:48

## 2023-01-01 RX ADMIN — SODIUM CHLORIDE: 9 INJECTION, SOLUTION INTRAVENOUS at 14:05

## 2023-01-01 RX ADMIN — SODIUM BICARBONATE 150 MEQ: 84 INJECTION INTRAVENOUS at 13:51

## 2023-01-01 RX ADMIN — VASOPRESSIN 0.05 UNITS/MIN: 0.2 INJECTION INTRAVENOUS at 08:20

## 2023-01-01 RX ADMIN — POTASSIUM CHLORIDE 10 MEQ: 7.46 INJECTION, SOLUTION INTRAVENOUS at 02:28

## 2023-01-01 RX ADMIN — PIPERACILLIN AND TAZOBACTAM 3.38 G: 3; .375 INJECTION, POWDER, FOR SOLUTION INTRAVENOUS at 00:23

## 2023-01-01 RX ADMIN — CEFEPIME 2000 MG: 2 INJECTION, POWDER, FOR SOLUTION INTRAVENOUS at 12:59

## 2023-01-01 RX ADMIN — DEXMEDETOMIDINE HYDROCHLORIDE 1.5 MCG/KG/HR: 4 INJECTION, SOLUTION INTRAVENOUS at 22:14

## 2023-01-01 RX ADMIN — CHLORHEXIDINE GLUCONATE 15 ML: 1.2 RINSE ORAL at 20:25

## 2023-01-01 RX ADMIN — CHLORHEXIDINE GLUCONATE 15 ML: 1.2 RINSE ORAL at 08:30

## 2023-01-01 RX ADMIN — LANSOPRAZOLE 30 MG: 30 TABLET, ORALLY DISINTEGRATING ORAL at 09:49

## 2023-01-01 RX ADMIN — DEXTROSE MONOHYDRATE 25 G: 25 INJECTION, SOLUTION INTRAVENOUS at 13:51

## 2023-01-01 RX ADMIN — Medication 10 ML: at 09:09

## 2023-01-01 RX ADMIN — PIPERACILLIN AND TAZOBACTAM 3.38 G: 3; .375 INJECTION, POWDER, FOR SOLUTION INTRAVENOUS at 08:39

## 2023-01-01 RX ADMIN — POTASSIUM CHLORIDE 10 MEQ: 7.46 INJECTION, SOLUTION INTRAVENOUS at 05:00

## 2023-01-01 RX ADMIN — CHLORHEXIDINE GLUCONATE 15 ML: 1.2 RINSE ORAL at 20:29

## 2023-01-01 RX ADMIN — PIPERACILLIN AND TAZOBACTAM 3.38 G: 3; .375 INJECTION, POWDER, FOR SOLUTION INTRAVENOUS at 11:51

## 2023-01-01 RX ADMIN — VECURONIUM BROMIDE 10 MG: 10 INJECTION, POWDER, LYOPHILIZED, FOR SOLUTION INTRAVENOUS at 14:05

## 2023-01-01 RX ADMIN — Medication 10 ML: at 21:00

## 2023-01-01 RX ADMIN — LEVETIRACETAM 750 MG: 100 INJECTION, SOLUTION INTRAVENOUS at 20:49

## 2023-01-01 RX ADMIN — Medication 0.06 MCG/KG/MIN: at 13:20

## 2023-01-01 RX ADMIN — SODIUM BICARBONATE 150 MEQ: 84 INJECTION INTRAVENOUS at 09:06

## 2023-01-01 RX ADMIN — FENTANYL CITRATE 50 MCG: 50 INJECTION, SOLUTION INTRAMUSCULAR; INTRAVENOUS at 03:16

## 2023-01-01 RX ADMIN — DOBUTAMINE HYDROCHLORIDE 5 MCG/KG/MIN: 100 INJECTION INTRAVENOUS at 00:34

## 2023-01-01 RX ADMIN — Medication 0.5 MCG/KG/MIN: at 17:50

## 2023-01-01 RX ADMIN — MAGNESIUM SULFATE HEPTAHYDRATE 2 G: 2 INJECTION, SOLUTION INTRAVENOUS at 02:32

## 2023-01-01 RX ADMIN — POTASSIUM PHOSPHATE, MONOBASIC POTASSIUM PHOSPHATE, DIBASIC 15 MMOL: 224; 236 INJECTION, SOLUTION, CONCENTRATE INTRAVENOUS at 08:15

## 2023-01-01 RX ADMIN — WHITE PETROLATUM 57.7 %-MINERAL OIL 31.9 % EYE OINTMENT: at 08:34

## 2023-01-01 RX ADMIN — ALPRAZOLAM 0.5 MG: 0.5 TABLET ORAL at 20:29

## 2023-01-01 RX ADMIN — DOPAMINE HYDROCHLORIDE 10 MCG/KG/MIN: 160 INJECTION, SOLUTION INTRAVENOUS at 23:37

## 2023-01-01 RX ADMIN — Medication 10 ML: at 08:47

## 2023-01-01 RX ADMIN — CEFEPIME 2000 MG: 2 INJECTION, POWDER, FOR SOLUTION INTRAVENOUS at 20:12

## 2023-01-01 RX ADMIN — POTASSIUM CHLORIDE 10 MEQ: 7.46 INJECTION, SOLUTION INTRAVENOUS at 04:06

## 2023-01-01 RX ADMIN — ALBUMIN (HUMAN) 25 G: 0.25 INJECTION, SOLUTION INTRAVENOUS at 13:18

## 2023-01-01 RX ADMIN — Medication 10 ML: at 03:55

## 2023-01-01 RX ADMIN — PHENYLEPHRINE HYDROCHLORIDE 0.5 MCG/KG/MIN: 10 INJECTION INTRAVENOUS at 04:21

## 2023-01-01 RX ADMIN — DEXMEDETOMIDINE HYDROCHLORIDE 1.5 MCG/KG/HR: 4 INJECTION, SOLUTION INTRAVENOUS at 08:32

## 2023-01-01 RX ADMIN — POTASSIUM CHLORIDE 20 MEQ: 400 INJECTION, SOLUTION INTRAVENOUS at 08:04

## 2023-01-01 RX ADMIN — MIDODRINE HYDROCHLORIDE 5 MG: 5 TABLET ORAL at 05:52

## 2023-01-01 RX ADMIN — Medication 50 MCG/HR: at 03:18

## 2023-01-01 RX ADMIN — MIDODRINE HYDROCHLORIDE 10 MG: 5 TABLET ORAL at 21:36

## 2023-01-01 RX ADMIN — Medication 0.5 MCG/KG/MIN: at 10:30

## 2023-01-01 RX ADMIN — METRONIDAZOLE 500 MG: 500 INJECTION, SOLUTION INTRAVENOUS at 12:45

## 2023-01-01 RX ADMIN — MIDODRINE HYDROCHLORIDE 5 MG: 5 TABLET ORAL at 11:49

## 2023-01-01 RX ADMIN — POTASSIUM PHOSPHATE, MONOBASIC POTASSIUM PHOSPHATE, DIBASIC 20 MMOL: 224; 236 INJECTION, SOLUTION, CONCENTRATE INTRAVENOUS at 11:34

## 2023-01-01 RX ADMIN — POTASSIUM CHLORIDE 10 MEQ: 7.46 INJECTION, SOLUTION INTRAVENOUS at 00:00

## 2023-01-01 RX ADMIN — MIDAZOLAM 2 MG: 1 INJECTION INTRAMUSCULAR; INTRAVENOUS at 03:10

## 2023-01-01 RX ADMIN — MIDAZOLAM HYDROCHLORIDE 2 MG/HR: 1 INJECTION, SOLUTION INTRAVENOUS at 03:17

## 2023-01-01 RX ADMIN — EPINEPHRINE 0.07 MCG/KG/MIN: 1 INJECTION INTRAMUSCULAR; INTRAVENOUS; SUBCUTANEOUS at 17:44

## 2023-01-01 RX ADMIN — EPOPROSTENOL 50 NG/KG/MIN: 1.5 INJECTION, POWDER, LYOPHILIZED, FOR SOLUTION INTRAVENOUS at 15:50

## 2023-01-01 RX ADMIN — DEXTROSE MONOHYDRATE 25 G: 25 INJECTION, SOLUTION INTRAVENOUS at 07:52

## 2023-01-01 RX ADMIN — Medication 0.16 MCG/KG/MIN: at 03:03

## 2023-01-01 RX ADMIN — PANTOPRAZOLE SODIUM 40 MG: 40 INJECTION, POWDER, LYOPHILIZED, FOR SOLUTION INTRAVENOUS at 21:37

## 2023-01-01 RX ADMIN — LANSOPRAZOLE 30 MG: 30 TABLET, ORALLY DISINTEGRATING ORAL at 08:00

## 2023-01-01 RX ADMIN — PANTOPRAZOLE SODIUM 40 MG: 40 INJECTION, POWDER, LYOPHILIZED, FOR SOLUTION INTRAVENOUS at 20:31

## 2023-01-01 RX ADMIN — Medication 0.04 MCG/KG/MIN: at 20:27

## 2023-01-01 RX ADMIN — POTASSIUM CHLORIDE 10 MEQ: 7.46 INJECTION, SOLUTION INTRAVENOUS at 01:09

## 2023-01-01 RX ADMIN — METRONIDAZOLE 500 MG: 500 INJECTION, SOLUTION INTRAVENOUS at 12:40

## 2023-01-01 RX ADMIN — DEXMEDETOMIDINE HYDROCHLORIDE 0.2 MCG/KG/HR: 4 INJECTION, SOLUTION INTRAVENOUS at 12:36

## 2023-01-01 RX ADMIN — CHLORHEXIDINE GLUCONATE 15 ML: 1.2 RINSE ORAL at 08:47

## 2023-01-01 RX ADMIN — MIDODRINE HYDROCHLORIDE 10 MG: 5 TABLET ORAL at 06:21

## 2023-01-01 RX ADMIN — CALCIUM CHLORIDE, MAGNESIUM CHLORIDE, DEXTROSE MONOHYDRATE, LACTIC ACID, SODIUM CHLORIDE, SODIUM BICARBONATE AND POTASSIUM CHLORIDE 2000 ML/HR: 5.15; 2.03; 22; 5.4; 6.46; 3.09; .157 INJECTION INTRAVENOUS at 19:18

## 2023-01-01 RX ADMIN — CEFEPIME 2000 MG: 2 INJECTION, POWDER, FOR SOLUTION INTRAVENOUS at 20:05

## 2023-01-01 RX ADMIN — DEXMEDETOMIDINE HYDROCHLORIDE 1.5 MCG/KG/HR: 4 INJECTION, SOLUTION INTRAVENOUS at 23:42

## 2023-01-01 RX ADMIN — SODIUM BICARBONATE 100 MEQ: 84 INJECTION INTRAVENOUS at 02:38

## 2023-01-01 RX ADMIN — VECURONIUM BROMIDE 10 MG: 1 INJECTION, POWDER, LYOPHILIZED, FOR SOLUTION INTRAVENOUS at 03:20

## 2023-01-01 RX ADMIN — METOPROLOL TARTRATE 25 MG: 25 TABLET, FILM COATED ORAL at 20:05

## 2023-01-01 RX ADMIN — DOCUSATE SODIUM 50MG AND SENNOSIDES 8.6MG 2 TABLET: 8.6; 5 TABLET, FILM COATED ORAL at 08:30

## 2023-01-01 RX ADMIN — Medication 10 ML: at 08:34

## 2023-01-01 RX ADMIN — HEPARIN SODIUM 2000 UNITS: 1000 INJECTION INTRAVENOUS; SUBCUTANEOUS at 10:36

## 2023-01-01 RX ADMIN — METOPROLOL TARTRATE 25 MG: 25 TABLET, FILM COATED ORAL at 12:19

## 2023-01-01 RX ADMIN — MIDODRINE HYDROCHLORIDE 5 MG: 5 TABLET ORAL at 18:13

## 2023-01-01 RX ADMIN — LEVETIRACETAM 750 MG: 100 INJECTION, SOLUTION INTRAVENOUS at 08:20

## 2023-01-01 RX ADMIN — DEXMEDETOMIDINE HYDROCHLORIDE 1.5 MCG/KG/HR: 4 INJECTION, SOLUTION INTRAVENOUS at 01:07

## 2023-01-01 RX ADMIN — CHLORHEXIDINE GLUCONATE 15 ML: 1.2 RINSE ORAL at 21:10

## 2023-01-01 RX ADMIN — SODIUM BICARBONATE 150 MEQ: 84 INJECTION INTRAVENOUS at 13:31

## 2023-01-01 RX ADMIN — EPINEPHRINE 1 MG: 0.1 INJECTION, SOLUTION ENDOTRACHEAL; INTRACARDIAC; INTRAVENOUS at 23:36

## 2023-01-01 RX ADMIN — CHLORHEXIDINE GLUCONATE 15 ML: 1.2 RINSE ORAL at 09:49

## 2023-01-01 RX ADMIN — Medication 0.4 MCG/KG/MIN: at 04:23

## 2023-01-01 RX ADMIN — VASOPRESSIN 0.07 UNITS/MIN: 0.2 INJECTION INTRAVENOUS at 22:52

## 2023-01-01 RX ADMIN — VASOPRESSIN 0.01 UNITS/MIN: 0.2 INJECTION INTRAVENOUS at 08:06

## 2023-01-01 RX ADMIN — VANCOMYCIN HYDROCHLORIDE 1250 MG: 1.25 INJECTION, POWDER, LYOPHILIZED, FOR SOLUTION INTRAVENOUS at 07:31

## 2023-01-01 RX ADMIN — EPOPROSTENOL 50 NG/KG/MIN: 1.5 INJECTION, POWDER, LYOPHILIZED, FOR SOLUTION INTRAVENOUS at 08:55

## 2023-01-01 RX ADMIN — CEFEPIME 2000 MG: 2 INJECTION, POWDER, FOR SOLUTION INTRAVENOUS at 04:27

## 2023-01-01 RX ADMIN — METOCLOPRAMIDE 10 MG: 5 INJECTION, SOLUTION INTRAMUSCULAR; INTRAVENOUS at 02:58

## 2023-01-01 RX ADMIN — PHENYLEPHRINE HYDROCHLORIDE 5 MCG/KG/MIN: 10 INJECTION INTRAVENOUS at 11:23

## 2023-01-01 RX ADMIN — ALPRAZOLAM 0.5 MG: 0.5 TABLET ORAL at 11:49

## 2023-01-01 RX ADMIN — AMIODARONE HYDROCHLORIDE 1 MG/MIN: 1.8 INJECTION, SOLUTION INTRAVENOUS at 23:17

## 2023-01-01 RX ADMIN — FOMEPIZOLE 750 MG: 1 INJECTION, SOLUTION INTRAVENOUS at 13:51

## 2023-01-01 RX ADMIN — FOMEPIZOLE 1120 MG: 1 INJECTION, SOLUTION INTRAVENOUS at 18:24

## 2023-01-01 RX ADMIN — CHLORHEXIDINE GLUCONATE 15 ML: 1.2 RINSE ORAL at 09:09

## 2023-01-01 RX ADMIN — CALCIUM CHLORIDE, MAGNESIUM CHLORIDE, DEXTROSE MONOHYDRATE, LACTIC ACID, SODIUM CHLORIDE, SODIUM BICARBONATE AND POTASSIUM CHLORIDE 2000 ML/HR: 5.15; 2.03; 22; 5.4; 6.46; 3.09; .157 INJECTION INTRAVENOUS at 21:43

## 2023-01-01 RX ADMIN — CHLORHEXIDINE GLUCONATE 15 ML: 1.2 RINSE ORAL at 12:37

## 2023-01-01 RX ADMIN — CHLORHEXIDINE GLUCONATE 15 ML: 1.2 RINSE ORAL at 08:20

## 2023-01-01 RX ADMIN — CALCIUM GLUCONATE 3000 MG: 98 INJECTION, SOLUTION INTRAVENOUS at 21:26

## 2023-01-01 RX ADMIN — Medication 10 ML: at 11:52

## 2023-01-01 RX ADMIN — CEFEPIME 2000 MG: 2 INJECTION, POWDER, FOR SOLUTION INTRAVENOUS at 17:49

## 2023-01-01 RX ADMIN — LEVETIRACETAM 750 MG: 100 INJECTION, SOLUTION INTRAVENOUS at 21:37

## 2023-01-01 RX ADMIN — POTASSIUM CHLORIDE 20 MEQ: 400 INJECTION, SOLUTION INTRAVENOUS at 05:52

## 2023-01-01 RX ADMIN — NOREPINEPHRINE BITARTRATE 0.03 MCG/KG/MIN: 8 INJECTION, SOLUTION INTRAVENOUS at 14:06

## 2023-01-01 RX ADMIN — DEXMEDETOMIDINE HYDROCHLORIDE 1.5 MCG/KG/HR: 4 INJECTION, SOLUTION INTRAVENOUS at 05:35

## 2023-01-01 RX ADMIN — LIDOCAINE HYDROCHLORIDE 10 ML: 10 INJECTION, SOLUTION INFILTRATION; PERINEURAL at 11:03

## 2023-01-01 RX ADMIN — CALCIUM CHLORIDE, MAGNESIUM CHLORIDE, DEXTROSE MONOHYDRATE, LACTIC ACID, SODIUM CHLORIDE, SODIUM BICARBONATE AND POTASSIUM CHLORIDE 2000 ML/HR: 5.15; 2.03; 22; 5.4; 6.46; 3.09; .157 INJECTION INTRAVENOUS at 19:17

## 2023-01-01 RX ADMIN — Medication 10 ML: at 20:07

## 2023-01-01 RX ADMIN — PANTOPRAZOLE SODIUM 40 MG: 40 INJECTION, POWDER, LYOPHILIZED, FOR SOLUTION INTRAVENOUS at 20:49

## 2023-01-01 RX ADMIN — CEFEPIME 2000 MG: 2 INJECTION, POWDER, FOR SOLUTION INTRAVENOUS at 12:19

## 2023-01-01 RX ADMIN — MIDODRINE HYDROCHLORIDE 10 MG: 5 TABLET ORAL at 22:11

## 2023-01-01 RX ADMIN — Medication 10 ML: at 20:34

## 2023-01-01 RX ADMIN — ALPRAZOLAM 0.5 MG: 0.5 TABLET ORAL at 08:03

## 2023-01-01 RX ADMIN — LIDOCAINE HYDROCHLORIDE 7 ML: 10 INJECTION, SOLUTION INFILTRATION; PERINEURAL at 11:01

## 2023-01-01 RX ADMIN — VASOPRESSIN 0.05 UNITS/MIN: 0.2 INJECTION INTRAVENOUS at 16:21

## 2023-01-01 RX ADMIN — POTASSIUM CHLORIDE 20 MEQ: 29.8 INJECTION, SOLUTION INTRAVENOUS at 07:31

## 2023-01-01 RX ADMIN — PANTOPRAZOLE SODIUM 40 MG: 40 INJECTION, POWDER, LYOPHILIZED, FOR SOLUTION INTRAVENOUS at 11:34

## 2023-01-01 RX ADMIN — VASOPRESSIN 0.05 UNITS/MIN: 0.2 INJECTION INTRAVENOUS at 20:05

## 2023-01-01 RX ADMIN — LEVETIRACETAM 750 MG: 100 INJECTION, SOLUTION INTRAVENOUS at 08:34

## 2023-01-01 RX ADMIN — CEFEPIME 2000 MG: 2 INJECTION, POWDER, FOR SOLUTION INTRAVENOUS at 12:35

## 2023-01-01 RX ADMIN — CALCIUM CHLORIDE, MAGNESIUM CHLORIDE, DEXTROSE MONOHYDRATE, LACTIC ACID, SODIUM CHLORIDE, SODIUM BICARBONATE AND POTASSIUM CHLORIDE 2000 ML/HR: 5.15; 2.03; 22; 5.4; 6.46; 3.09; .157 INJECTION INTRAVENOUS at 05:06

## 2023-01-01 RX ADMIN — DOBUTAMINE HYDROCHLORIDE 5 MCG/KG/MIN: 100 INJECTION INTRAVENOUS at 13:41

## 2023-01-01 RX ADMIN — CEFEPIME 2000 MG: 2 INJECTION, POWDER, FOR SOLUTION INTRAVENOUS at 04:35

## 2023-01-01 RX ADMIN — FUROSEMIDE 40 MG: 10 INJECTION, SOLUTION INTRAMUSCULAR; INTRAVENOUS at 03:21

## 2023-01-01 RX ADMIN — Medication 10 ML: at 20:30

## 2023-01-01 RX ADMIN — CALCIUM CHLORIDE, MAGNESIUM CHLORIDE, SODIUM CHLORIDE, SODIUM BICARBONATE, POTASSIUM CHLORIDE AND SODIUM PHOSPHATE DIBASIC DIHYDRATE 2000 ML/HR: 3.68; 3.05; 6.34; 3.09; .314; .187 INJECTION INTRAVENOUS at 06:00

## 2023-01-01 RX ADMIN — POTASSIUM CHLORIDE 40 MEQ: 1.5 POWDER, FOR SOLUTION ORAL at 03:43

## 2023-01-01 RX ADMIN — EPOPROSTENOL 50 NG/KG/MIN: 1.5 INJECTION, POWDER, LYOPHILIZED, FOR SOLUTION INTRAVENOUS at 04:02

## 2023-01-01 RX ADMIN — CALCIUM CHLORIDE, MAGNESIUM CHLORIDE, SODIUM CHLORIDE, SODIUM BICARBONATE, POTASSIUM CHLORIDE AND SODIUM PHOSPHATE DIBASIC DIHYDRATE 2000 ML/HR: 3.68; 3.05; 6.34; 3.09; .314; .187 INJECTION INTRAVENOUS at 12:19

## 2023-01-01 RX ADMIN — LEVETIRACETAM 1000 MG: 100 INJECTION, SOLUTION INTRAVENOUS at 17:40

## 2023-01-01 RX ADMIN — METRONIDAZOLE 500 MG: 500 INJECTION, SOLUTION INTRAVENOUS at 21:38

## 2023-01-01 RX ADMIN — Medication 0.05 MCG/KG/MIN: at 23:38

## 2023-01-01 RX ADMIN — PHENYLEPHRINE HYDROCHLORIDE 5 MCG/KG/MIN: 10 INJECTION INTRAVENOUS at 15:37

## 2023-01-01 RX ADMIN — VASOPRESSIN 0.05 UNITS/MIN: 0.2 INJECTION INTRAVENOUS at 13:41

## 2023-01-01 RX ADMIN — PANTOPRAZOLE SODIUM 40 MG: 40 INJECTION, POWDER, LYOPHILIZED, FOR SOLUTION INTRAVENOUS at 08:20

## 2023-01-01 RX ADMIN — POTASSIUM CHLORIDE 20 MEQ: 29.8 INJECTION, SOLUTION INTRAVENOUS at 08:33

## 2023-01-01 RX ADMIN — SODIUM BICARBONATE 50 MEQ: 84 INJECTION INTRAVENOUS at 11:11

## 2023-01-01 RX ADMIN — CALCIUM GLUCONATE 1000 MG: 20 INJECTION, SOLUTION INTRAVENOUS at 17:18

## 2023-01-01 RX ADMIN — LEVETIRACETAM 750 MG: 100 INJECTION, SOLUTION INTRAVENOUS at 20:12

## 2023-01-01 RX ADMIN — EPINEPHRINE 1 MG: 0.1 INJECTION, SOLUTION ENDOTRACHEAL; INTRACARDIAC; INTRAVENOUS at 23:09

## 2023-01-01 RX ADMIN — PHENYLEPHRINE HYDROCHLORIDE 6 MCG/KG/MIN: 10 INJECTION INTRAVENOUS at 08:25

## 2023-01-01 RX ADMIN — LEVETIRACETAM 750 MG: 100 INJECTION, SOLUTION INTRAVENOUS at 08:39

## 2023-01-01 RX ADMIN — HEPARIN SODIUM 5000 UNITS: 5000 INJECTION INTRAVENOUS; SUBCUTANEOUS at 05:13

## 2023-01-01 RX ADMIN — EPOPROSTENOL 50 NG/KG/MIN: 1.5 INJECTION, POWDER, LYOPHILIZED, FOR SOLUTION INTRAVENOUS at 23:26

## 2023-01-01 RX ADMIN — MIDODRINE HYDROCHLORIDE 10 MG: 5 TABLET ORAL at 16:09

## 2023-11-03 PROBLEM — I46.9 CARDIAC ARREST WITH SUCCESSFUL RESUSCITATION: Status: ACTIVE | Noted: 2023-01-01

## 2023-11-03 PROBLEM — F12.10 MARIJUANA ABUSE: Chronic | Status: ACTIVE | Noted: 2023-01-01

## 2023-11-03 PROBLEM — R73.9 ELEVATED BLOOD SUGAR: Status: ACTIVE | Noted: 2023-01-01

## 2023-11-03 PROBLEM — F10.10 ALCOHOL ABUSE: Chronic | Status: ACTIVE | Noted: 2023-01-01

## 2023-11-03 NOTE — PROCEDURES
Insert Arterial Line    Date/Time: 11/3/2023 5:41 AM    Performed by: Arely Sarmiento APRN  Authorized by: Arely Sarmiento APRN    Universal Protocol:     Emergent situation      Patient identity confirmed:  Arm band, anonymous protocol, patient vented/unresponsive and hospital-assigned identification number    Time out: Immediately prior to the procedure a time out was called    A time out verifies correct patient, procedure, equipment, support staff and site/side marked as required:   Preparation:     Preparation: Patient was prepped and draped in usual sterile fashion    Indications:     Indications: multiple ABGs, respiratory failure and hemodynamic monitoring    Location:     Location:  Right radial  Procedure Details:     Ultrasound Guidance: yes  The ultrasound was used for evaluation of possible access sites.  Vessel patency was confirmed with the ultrasound.  Needle entry into vessel was visualized in realtime with the ultrasound.   Permanent recorded image(s) of the vascular access site will be included in the patient record.      Glenroy's test normal?: Yes      Needle gauge:  20    Seldinger technique: Seldinger technique used      Number of attempts:  1  Post-procedure:     Post-procedure:  Line sutured and dressing applied    Post-procedure CMS:  Normal and unchanged     patient tolerated the procedure well with no immediate complications  Electronically signed by EDOUARD Sr, 11/03/23, 5:42 AM EDT.

## 2023-11-03 NOTE — PROGRESS NOTES
"Pharmacy Antimicrobial Dosing Service    Subjective:  Yusuf Jin is a 52 y.o.male admitted s/p cardiac arrest with ROSC. Pharmacy has been consulted to dose Vancomycin and Cefepime for possible sepsis, PNA.    HPI: s/p cardiac arrest, intubated and unresponsive.       Assessment/Plan    1. Day #1 Vancomycin: Pulse dosing d/t renal dysfxn. Will load with vancomycin 1,750 mg IV once and assess clearance with level tomorrow morning.    2. Day #1 Cefepime: 2 IV q24h for estCrCl < 30 mL/min.    Will continue to monitor drug levels, renal function, culture and sensitivities, and patient clinical status.       Objective:  Relevant clinical data and objective history reviewed:  175.3 cm (69\")   74.9 kg (165 lb 2 oz)   Ideal body weight: 70.7 kg (155 lb 13.8 oz)  Adjusted ideal body weight: 72.4 kg (159 lb 9.1 oz)  Body mass index is 24.38 kg/m².        Results from last 7 days   Lab Units 11/03/23  1026 11/03/23  0536 11/03/23  0359   CREATININE mg/dL 2.71* 2.15* 1.35     Estimated Creatinine Clearance: 33.4 mL/min (A) (by C-G formula based on SCr of 2.71 mg/dL (H)).  I/O last 3 completed shifts:  In: 733 [I.V.:733]  Out: 150 [Urine:150]    Results from last 7 days   Lab Units 11/03/23  1026 11/03/23  0536 11/02/23  2323   WBC 10*3/mm3 2.00* 2.80* 7.50     Temperature    11/03/23 1300 11/03/23 1403 11/03/23 1509   Temp: 99 °F (37.2 °C) 99.7 °F (37.6 °C) (!) 90.5 °F (32.5 °C)     Baseline culture/source/susceptibility:  Microbiology Results (last 10 days)       Procedure Component Value - Date/Time    Respiratory Culture - Sputum, ET Suction [[id number]] Collected: 11/03/23 0243    Lab Status: Preliminary result Specimen: Sputum from ET Suction Updated: 11/03/23 0668     Gram Stain Many (4+) WBCs per low power field      Rare (1+) Epithelial cells per low power field      Few (2+) Mixed bacterial morphotypes seen on Gram Stain      Rare (1+) Yeast    COVID-19 and FLU A/B PCR - Swab, Nasopharynx [[id number]]  (Normal) " Collected: 11/02/23 2354    Lab Status: Final result Specimen: Swab from Nasopharynx Updated: 11/03/23 0024     COVID19 Not Detected     Influenza A PCR Not Detected     Influenza B PCR Not Detected    Narrative:      Fact sheet for providers: https://www.fda.gov/media/910422/download    Fact sheet for patients: https://www.fda.gov/media/096572/download    Test performed by PCR.            Jackie Parekh PharmD  11/03/23 15:52 EDT

## 2023-11-03 NOTE — CONSULTS
Nutrition Services    Patient Name:  Yusuf Jin  YOB: 1970  MRN: [id number]  Admit Date:  11/2/2023    RD consulted for tube feeding.  Patient on epi, veletri, fentanyl, versed, levo, kelle, vaso, norcuron, D5W.  Last MAP 59 mmHg.  Lactate 6.9 mmol/L (down from 7.7 on admission).  Per discussion with EDOUARD Mccord, no plans to feed at this time. Please re-consult RD if/when it is appropriate to begin nutrition support.      Electronically signed by:  Cassy Donovan RD  11/03/23 08:42 EDT

## 2023-11-03 NOTE — NURSING NOTE
Asked to evaluate picc line due to possible swelling and line being positional. CXR reviewed from this morning which shows tip of picc line in the brachiocephalic vein which would actually make the line a midline and not a picc line. IBAN Hunt notified of line status and to notify MD of malposition. Patient proned at this time. Recommend placement of central line. RN to notify MD.  Assessment of right arm shows minimal edema. Arm soft to touch. Continue to monitor for increased edema. IBAN Hunt notified.

## 2023-11-03 NOTE — ED NOTES
Per MD, he spoke with the sister who witnessed the arrest. Sister said the weed he was smoking didn't smell like the weed he was used to and pt had been drinking excessively. Sister states she had a fentanyl test kit at home and the weed tested positive for fentanyl

## 2023-11-03 NOTE — CONSULTS
Primary Care Provider: Provider, No Known     Consult requested by: Dr. Phillip    Reason for Consultation: Neurological evaluation /anoxic encephalopathy    History taken from: chart family RN    Chief complaint: Was found down       SUBJECTIVE:    History of present illness: Background per H&P: Yusuf Jin is a 52 y.o. year old male who was evaluated in room CVCU 207 at [facility]    Information is mostly the medical records  He has a friend in the room by that is not the direct witness    Also I talked to his mother who has not seen him since at least July of this year    Apparently had a witnessed arrest and as per records it looks like she had some fentanyl laced, what was reported as marijuana.  He became unresponsive  He was resuscitated and brought back    He is on multiple pressors  He has no corneal or pupillary responses    Though he is sedated to minimal degree, I stopped his paralytic and I did not see any changes    He is Dianna Coma Scale 3T    Admitting team decided that he would not be a TTM candidate because of changes on CT   we may repeat the imaging studies    No seizure-like activity            As per admitting,  Yusuf Jin is a 52 y.o. male with no reported PMH presented to the hospital for cardiac arrest, and was admitted with a principal diagnosis of Cardiac arrest with successful resuscitation.  HPI information is limited due to patient intubated and unresponsive; information obtained from ER report and chart review.  Per reported history at this time patient's sister stated he had been drinking excessively and went to smoke some marijuana.  She states the marijuana looked and smelled different than any she never seen before and after he fell to the floor she called 911.  After her brother was taken to the hospital she performed a fentanyl test kit on the marijuana and it tested 100% for fentanyl.  Patient was brought to the intensive care unit for further evaluation  and treatment.        ACP: Patient will remain full code with full intervention; attempting to determine next of kin.           - Portions of the above HPI were copied from previous encounters and edited as appropriate. PMH as detailed below.     Review of Systems   Not possible, patient essentially in coma    PATIENT HISTORY:  History reviewed. No pertinent past medical history., No past surgical history on file., History reviewed. No pertinent family history.,  ,   Prior to Admission medications    Medication Sig Start Date End Date Taking? Authorizing Provider   methocarbamol (ROBAXIN) 750 MG tablet Take 1 tablet by mouth Every 8 (Eight) Hours As Needed (pain). 10/30/23   Provider, MD Elliott    Allergies:  Patient has no allergy information on record.    Current Facility-Administered Medications   Medication Dose Route Frequency Provider Last Rate Last Admin    acetaminophen (TYLENOL) tablet 650 mg  650 mg Oral Q4H PRN Arely Sarmiento APRN        Or    acetaminophen (TYLENOL) suppository 650 mg  650 mg Rectal Q4H PRN Arely Sarmiento APRN        artificial tears ophthalmic ointment   Both Eyes PRN Arely Sarmiento APRN        sennosides-docusate (PERICOLACE) 8.6-50 MG per tablet 2 tablet  2 tablet Oral BID PRN Jed Evans APRN        And    polyethylene glycol (MIRALAX) packet 17 g  17 g Oral Daily PRN Jed Evans E APRN        And    bisacodyl (DULCOLAX) EC tablet 5 mg  5 mg Oral Daily PRN Cristina Jed E, APRN        And    bisacodyl (DULCOLAX) suppository 10 mg  10 mg Rectal Daily PRN Jed Evans, APRN        Calcium Replacement - Follow Nurse / BPA Driven Protocol   Does not apply PRN Arely Sarmiento APRN        chlorhexidine (PERIDEX) 0.12 % solution 15 mL  15 mL Mouth/Throat Q12H Arely Sarmiento APRN   15 mL at 11/03/23 1237    dextrose (D50W) (25 g/50 mL) IV injection 25 g  25 g Intravenous Q15 Min PRN Arely Sarmiento APRN   25 g at 11/03/23 0823    dextrose (GLUTOSE) oral gel 15  g  15 g Oral Q15 Min PRN Arely Sarmiento APRN        dextrose 10 % infusion  50 mL/hr Intravenous Continuous Jed Evans APRN 50 mL/hr at 11/03/23 1003 50 mL/hr at 11/03/23 1003    EPINEPHrine 10 mg in 250 mL NS infusion  0.02-0.5 mcg/kg/min Intravenous Titrated Jed Evans APRN        epoprostenol (VELETRI) 1.5mg in 50mL NS (27733 ng/mL) nebulization  50 ng/kg/min (Ideal) Inhalation Continuous Arely Sarmiento APRN 7.07 mL/hr at 11/03/23 0855 50 ng/kg/min at 11/03/23 0855    fentaNYL citrate (PF) (SUBLIMAZE) injection 50 mcg  50 mcg Intravenous Q1H PRN Arely Sarmiento APRN   50 mcg at 11/03/23 0316    glucagon (GLUCAGEN) injection 1 mg  1 mg Intramuscular Q15 Min PRN Arely Sarmiento APRN        insulin lispro (HUMALOG/ADMELOG) injection 2-7 Units  2-7 Units Subcutaneous Q6H Arely Sarmiento APRN   3 Units at 11/03/23 0244    ipratropium-albuterol (DUO-NEB) nebulizer solution 3 mL  3 mL Nebulization Q6H PRN Arely Sarmiento APRN        Magnesium Low Dose Replacement - Follow Nurse / BPA Driven Protocol   Does not apply PRN Arely Sarmiento APRN        nitroglycerin (NITROSTAT) SL tablet 0.4 mg  0.4 mg Sublingual Q5 Min PRN Arely Sarmiento APRN        norepinephrine (LEVOPHED) 16 mg in 250 mL NS infusion  0.02-0.5 mcg/kg/min Intravenous Titrated Jed Evans APRN 35.1 mL/hr at 11/03/23 1030 0.5 mcg/kg/min at 11/03/23 1030    ondansetron (ZOFRAN) tablet 4 mg  4 mg Oral Q6H PRN Arely Sarmiento APRN        Or    ondansetron (ZOFRAN) injection 4 mg  4 mg Intravenous Q6H PRN Arely Sarmiento APRN        pantoprazole (PROTONIX) injection 40 mg  40 mg Intravenous Q12H Jed Evans APRN   40 mg at 11/03/23 0835    phenylephrine (SHARLA-SYNEPHRINE) 100 mg in 250 mL NS infusion  0.5-6 mcg/kg/min Intravenous Titrated Jed Evans APRN 56.2 mL/hr at 11/03/23 1123 5 mcg/kg/min at 11/03/23 1123    Phosphorus Replacement - Follow Nurse / BPA Driven Protocol   Does not apply PRN Arely Sarmiento,  EDOUARD        Potassium Replacement - Follow Nurse / BPA Driven Protocol   Does not apply PRN Arely Sarmiento APRJODI        sodium bicarbonate 8.4 % 150 mEq in dextrose (D5W) 5 % 1,000 mL infusion (greater than 75 mEq)  150 mEq Intravenous Continuous Arely Sarmiento APRN 100 mL/hr at 11/03/23 1331 150 mEq at 11/03/23 1331    sodium chloride 0.9 % flush 10 mL  10 mL Intravenous PRN Andrew Mccauley MD        sodium chloride 0.9 % flush 10 mL  10 mL Intravenous PRN Andrew Mccauley MD        sodium chloride 0.9 % flush 10 mL  10 mL Intravenous PRN Arely Sarmiento, APRJODI        sodium chloride 0.9 % flush 10 mL  10 mL Intravenous Q12H OssunJoseer L, DO   10 mL at 11/03/23 0845    sodium chloride 0.9 % flush 10 mL  10 mL Intravenous Q12H OssunTremaineopher L, DO   10 mL at 11/03/23 0845    sodium chloride 0.9 % flush 10 mL  10 mL Intravenous Q12H OssunVictoriano L, DO   10 mL at 11/03/23 0845    sodium chloride 0.9 % flush 10 mL  10 mL Intravenous PRN OssunTremaine cortésopher L, DO        sodium chloride 0.9 % flush 20 mL  20 mL Intravenous PRN OssunTremaine cortésopher L, DO        sodium chloride 0.9 % infusion 40 mL  40 mL Intravenous PRN Arely Sarmiento, APRN        sodium chloride 0.9 % infusion 40 mL  40 mL Intravenous PRN OssunTremaine cortésopher L, DO        Vasopressin (VASOSTRICT) 0.2 UNIT/ML solution  0.01-0.1 Units/min Intravenous Continuous Jed Evans APRN 9 mL/hr at 11/03/23 0830 0.03 Units/min at 11/03/23 0830        ________________________________________________________        OBJECTIVE:  Upon today's exam, debated and sedated     Neurologic Exam  Comatose exam  PHYSICAL EXAM:    Constitutional: The patient is in no apparent distress, intubated on ventilator with sedation and paralytics. Comatose.     Head: Normocephalic, atraumatic.     Chest: Intubated on ventilator    Cardiac: Regular rate and rhythm.     Extremities:  No clubbing, cyanosis. Mild edema all  extremities    NEUROLOGICAL:    Cognition:   Comatose, no sedation  No response    Cranial nerves;  0/3 eye signs, pupils fixed at 2mm  no apparent facial asymmetry     Sensory:  No response to painful stimuli    Motor: No spontaneous movements, no withdraw to painful stimuli    Cerebellar and gait not able to be tested as pt is comatose.    [place] Coma Scale 3T  ________________________________________________________   RESULTS REVIEW:    VITAL SIGNS:   Temp:  [93.9 °F (34.4 °C)-99.7 °F (37.6 °C)] 99.7 °F (37.6 °C)  Heart Rate:  [] 130  Resp:  [16-30] 30  BP: ()/() 83/39  FiO2 (%):  [100 %] 100 %     LABS:      Lab 11/03/23  1257 11/03/23  1026 11/03/23  0536 11/03/23  0359 11/03/23  0231 11/03/23  0151 11/02/23  2345 11/02/23  2323   WBC  --  2.00* 2.80*  --   --   --   --  7.50   HEMOGLOBIN  --  16.0 16.1  --   --   --   --  13.6   HEMOGLOBIN, POC  --   --   --  15.1  --  14.6  --   --    HEMATOCRIT  --  47.8 47.6  --   --   --   --  40.7   HEMATOCRIT POC  --   --   --  44  --  43  --   --    PLATELETS  --  231 242  --   --   --   --  229   NEUTROS ABS  --   --  1.40*  --   --   --   --  2.40   LYMPHS ABS  --   --  1.20  --   --   --   --   --    MONOS ABS  --   --  0.10  --   --   --   --   --    EOS ABS  --   --  0.00  --   --   --   --   --    MCV  --  97.7* 97.3*  --   --   --   --  99.0*   LACTATE  --  7.7*  --  6.9* 6.7* 7.1* 7.7*  --    PROTIME 13.0*  --   --   --   --   --   --   --    APTT 25.6  --   --   --   --   --   --   --          Lab 11/03/23  1026 11/03/23  0536 11/03/23  0359 11/03/23  0151 11/02/23 2323 11/02/23 2321   SODIUM 146* 147*  --   --  142  --    POTASSIUM 3.1* 3.1*  --   --  3.9  --    CHLORIDE 114* 113*  --   --  102  --    CO2 16.0* 17.0*  --   --  21.0*  --    POC ANION GAP ISTAT  --   --   --   --   --  19.0   ANION GAP 16.0* 17.0*  --   --  19.0*  --    BUN 17 14  --   --  11  --    CREATININE 2.71* 2.15* 1.35 1.07 1.33* 1.60*   EGFR 27.2* 35.9* 62.8 83.0  63.9 51.2*   GLUCOSE 128* 55*  --   --  266*  --    CALCIUM 8.2* 8.3*  --   --  9.1  --    MAGNESIUM  --  1.9  --   --  2.4  --    PHOSPHORUS  --  4.0  --   --   --   --    HEMOGLOBIN A1C  --  5.10  --   --   --   --    TSH  --  1.920  --   --   --   --          Lab 11/03/23  0536 11/02/23 2323   TOTAL PROTEIN 5.5* 6.3   ALBUMIN 3.5 3.9   GLOBULIN 2.0 2.4   ALT (SGPT) 163* 93*   AST (SGOT) 433* 154*   BILIRUBIN 0.4 0.2   ALK PHOS 79 65         Lab 11/03/23  1257 11/03/23  0231 11/02/23 2323   PROBNP  --   --  <36.0   HSTROP T  --  298* 10   PROTIME 13.0*  --   --    INR 1.21*  --   --          Lab 11/03/23  0536   CHOLESTEROL 192   LDL CHOL 116*   HDL CHOL 60   TRIGLYCERIDES 90             Lab 11/03/23  1308 11/03/23  1023 11/03/23  0424   PH, ARTERIAL 7.130* 6.939* 6.943*   PCO2, ARTERIAL 57.8* 68.7* 74.7*   PO2 .0* 229.4* 69.7*   O2 SATURATION ART 99.1* 99.2* 78.8*   FIO2 100 100 100   HCO3 ART 19.3* 14.7* 16.1*   BASE EXCESS ART -10.7* -18.8* -17.8*     UA          11/2/2023    23:33   Urinalysis   Squamous Epithelial Cells, UA 0-2    Specific Gravity, UA <=1.005    Ketones, UA Negative    Blood, UA Trace    Leukocytes, UA Negative    Nitrite, UA Negative    RBC, UA None Seen    WBC, UA 0-2    Bacteria, UA None Seen        Lab Results   Component Value Date    TSH 1.920 11/03/2023     (H) 11/03/2023    HGBA1C 5.10 11/03/2023       IMAGING STUDIES:  XR Abdomen KUB    Result Date: 11/3/2023  Impression: Feeding tube terminates in the stomach. Electronically Signed: Grey Newton MD  11/3/2023 3:21 AM EDT  Workstation ID: [id number]    XR Chest 1 View    Result Date: 11/3/2023  Impression: 1.Worsening diffuse bilateral airspace disease. 2.Endotracheal tube tip 4.6 cm above the andry. 3.Right upper extremity PICC terminates in the right brachiocephalic vein. Electronically Signed: Grey Newton MD  11/3/2023 3:21 AM EDT  Workstation ID: [id number]    CT Angiogram Chest Pulmonary Embolism    Result  Date: 11/3/2023  Impression: 1.Multifocal pneumonia. 2.No evidence of pulmonary embolism. Electronically Signed: Grey Newton MD  11/3/2023 12:22 AM EDT  Workstation ID: [id number]    CT Head Without Contrast    Result Date: 11/3/2023  Impression: 1.Gray-white differentiation is present, but poor throughout the brain and cerebellum. This could be related to anoxic brain injury. This could be further evaluated with follow-up brain MRI or serial CT as indicated. 2.No acute intracranial hemorrhage. Electronically Signed: Grey Newton MD  11/3/2023 12:20 AM EDT  Workstation ID: [id number]    XR Chest 1 View    Result Date: 11/2/2023  Impression: 1.ET tube tip 5.3 cm above the andry. 2.Right apical airspace disease. Electronically Signed: Grey Newton MD  11/2/2023 11:37 PM EDT  Workstation ID: [id number]     I reviewed the patient's new clinical results.    ________________________________________________________     PROBLEM LIST:    Cardiac arrest with successful resuscitation    Marijuana abuse    Alcohol abuse    Elevated blood sugar            ASSESSMENT/PLAN:  Suspected anoxic encephalopathy    I talked to his mother over the phone and there are 3 possibilities    Some of these patients have normal recovery, and other extremities brain death and some of these patients are in between    We will have to give him time and see how things go    Continue supportive care    Nothing suggesting CNS infection seizure or status or large stroke    We may need repeat imaging studies    Family to decide    If however to make a decision on what I am seeing right now this will be a poor prognostic picture but we still have some sedation on board      I discussed the patient's findings and my recommendations with family, nursing staff, and primary care team    Chacho Sher MD  11/03/23  14:39 EDT

## 2023-11-03 NOTE — PAYOR COMM NOTE
"Yusuf Jin (52 y.o. Male)       Date of Birth   1970    Social Security Number       Address   [address] [place] [facility]    Home Phone   [phone]    MRN   [id number]       Christian   Unknown    Marital Status   Unknown                            Admission Date   23    Admission Type   Emergency    Admitting Provider   Victoriano Phillip DO    Attending Provider   Victoriano Phillip DO    Department, Room/Bed   [facility] CARDIOVASCULAR CARE UNIT,        Discharge Date       Discharge Disposition       Discharge Destination                                 Attending Provider: Victoriano Phillip DO    Allergies: Not on File    Isolation: None   Infection: None   Code Status: CPR    Ht: 175.3 cm (69\")   Wt: 74.9 kg (165 lb 2 oz)    Admission Cmt: None   Principal Problem: Cardiac arrest with successful resuscitation [I46.9]                   Active Insurance as of 2023       Primary Coverage       Payor Plan Insurance Group Employer/Plan Group    ANTHEM MEDICAID ANTHEM MEDICAID [id number]       Payor Plan Address Payor Plan Phone Number Payor Plan Fax Number Effective Dates    [address] [address] [phone]  2019 - None Entered    [facility] [id number]         Subscriber Name Subscriber Birth Date Member ID       YUSUF JIN 1970 [id number]                     Emergency Contacts        (Rel.) Home Phone Work Phone Mobile Phone    -Kristine (Sister) -- -- [phone]                 History & Physical        Arely Sarmiento APRN at 23 0149       Attestation signed by Victoriano Phillip DO at 23 0815    I have reviewed this documentation and agree.                    Critical Care History and Physical     Yusuf Jin : 1970 MRN:[id number] LOS:0 ROOM:      Reason for admission: Cardiac arrest with successful resuscitation     Assessment / Plan     Cardiopulmonary arrest " with successful resuscitation  -No TTM due to anoxic injury on initial CT scan  -Monitor and trend labs as appropriate  -Mechanical ventilation support and management  -Vasopressor support  -Pan culture    Elevated random blood sugar  -A1C  -Start SSI  -Accu-checks Q 6 hours        Code Status (Patient has no pulse and is not breathing): CPR (Attempt to Resuscitate)  Medical Interventions (Patient has pulse or is breathing): Full Support       Nutrition:   NPO Diet NPO Type: Strict NPO     DVT prophylaxis:  Medical and mechanical DVT prophylaxis orders are present.     History of Present illness     Yusuf Jin is a 52 y.o. male with no reported PMH presented to the hospital for cardiac arrest, and was admitted with a principal diagnosis of Cardiac arrest with successful resuscitation.  HPI information is limited due to patient intubated and unresponsive; information obtained from ER report and chart review.  Per reported history at this time patient's sister stated he had been drinking excessively and went to smoke some marijuana.  She states the marijuana looked and smelled different than any she never seen before and after he fell to the floor she called 911.  After her brother was taken to the hospital she performed a fentanyl test kit on the marijuana and it tested 100% for fentanyl.  Patient was brought to the intensive care unit for further evaluation and treatment.      ACP: Patient will remain full code with full intervention; attempting to determine next of kin.       Patient was seen and examined on 11/03/23 at 01:49 EDT .    Subjective / Review of systems     Review of Systems   Unable to perform ROS: Acuity of condition        Past Medical/Surgical/Social/Family History & Allergies     No past medical history on file.   No past surgical history on file.   Social History     Socioeconomic History    Marital status: Unknown      No family history on file.   Not on File   Social Determinants of  Health     Tobacco Use: Not on file   Alcohol Use: Not on file   Financial Resource Strain: Not on file   Food Insecurity: Not on file   Transportation Needs: Not on file   Physical Activity: Not on file   Stress: Not on file   Social Connections: Unknown (11/3/2023)    Family and Community Support     Help with Day-to-Day Activities: Not on file     Lonely or Isolated: Not on file   Interpersonal Safety: Unknown (11/3/2023)    Abuse Screen     Unsafe at Home or Work/School: unable to answer (comment required)     Feels Threatened by Someone?: unable to answer (comment required)     Does Anyone Keep You from Contacting Others or Doint Things Outside the Home?: unable to answer (comment required)     Physical Sign of Abuse Present: no   Depression: Not on file   Housing Stability: Unknown (11/3/2023)    Housing Stability     Current Living Arrangements: Not on file     Potentially Unsafe Housing Conditions: Not on file   Utilities: Not on file   Health Literacy: Unknown (11/3/2023)    Education     Help with school or training?: Not on file     Preferred Language: Not on file   Employment: Unknown (11/3/2023)    Employment     Do you want help finding or keeping work or a job?: Not on file   Disabilities: Unknown (11/3/2023)    Disabilities     Concentrating, Remembering, or Making Decisions Difficulty: Not on file     Doing Errands Independently Difficulty: Not on file        Home Medications     Prior to Admission medications    Not on File        Objective / Physical Exam     Vital signs:  Temp: 93.9 °F (34.4 °C)  BP: 118/55  Heart Rate: 115  Resp: 24  SpO2: 97 %  Weight: 74.9 kg (165 lb 2 oz)    Admission Weight: Weight: 72 kg (158 lb 11.7 oz)    Physical Exam  Vitals and nursing note reviewed.   Constitutional:       Appearance: Normal appearance.   HENT:      Head: Normocephalic.      Nose: Nose normal.      Mouth/Throat:      Mouth: Mucous membranes are moist.      Pharynx: Oropharynx is clear.   Eyes:       Comments: Fixed and dilated   Cardiovascular:      Rate and Rhythm: Normal rate and regular rhythm.      Pulses: Normal pulses.      Heart sounds: Normal heart sounds.   Pulmonary:      Comments: ET tube in place  Abdominal:      General: Bowel sounds are normal.      Palpations: Abdomen is soft.   Musculoskeletal:         General: Normal range of motion.      Cervical back: Normal range of motion.   Skin:     General: Skin is warm and dry.      Capillary Refill: Capillary refill takes 2 to 3 seconds.   Neurological:      Comments: Status postcardiac arrest, nonresponsive, intubated   Psychiatric:      Comments: Status postcardiac arrest, nonresponsive, intubated          Labs     Results from last 7 days   Lab Units 11/02/23  2323   WBC 10*3/mm3 7.50   HEMATOCRIT % 40.7   PLATELETS 10*3/mm3 229      Results from last 7 days   Lab Units 11/02/23  2323   SODIUM mmol/L 142   POTASSIUM mmol/L 3.9   CHLORIDE mmol/L 102   CO2 mmol/L 21.0*   BUN mg/dL 11   CREATININE mg/dL 1.33*        Imaging     CT Angiogram Chest Pulmonary Embolism    Result Date: 11/3/2023  Impression: 1.Multifocal pneumonia. 2.No evidence of pulmonary embolism. Electronically Signed: Grey Newton MD  11/3/2023 12:22 AM EDT  Workstation ID: [id number]    CT Head Without Contrast    Result Date: 11/3/2023  Impression: 1.Gray-white differentiation is present, but poor throughout the brain and cerebellum. This could be related to anoxic brain injury. This could be further evaluated with follow-up brain MRI or serial CT as indicated. 2.No acute intracranial hemorrhage. Electronically Signed: Grey Newton MD  11/3/2023 12:20 AM EDT  Workstation ID: [id number]    XR Chest 1 View    Result Date: 11/2/2023  Impression: 1.ET tube tip 5.3 cm above the andry. 2.Right apical airspace disease. Electronically Signed: Grey Newton MD  11/2/2023 11:37 PM EDT  Workstation ID: [id number]       Chest X ray: My independent assessment showed no infiltrates or  effusions    EKG: My independent evaluation showed sinus rhythm, no ST -T changes    Current Medications     Scheduled Meds:  chlorhexidine, 15 mL, Mouth/Throat, Q12H  heparin (porcine), 5,000 Units, Subcutaneous, Q8H  insulin lispro, 2-7 Units, Subcutaneous, Q6H  pantoprazole, 40 mg, Intravenous, Q24H  piperacillin-tazobactam, 3.375 g, Intravenous, Q8H  senna-docusate sodium, 2 tablet, Oral, BID  sodium chloride, 10 mL, Intravenous, Q12H         Continuous Infusions:  EPINEPHrine, 0.02-0.5 mcg/kg/min  phenylephrine, 0.5-6 mcg/kg/min       Patient continues to be critically ill, remains at risk of clinical deterioration or death and needed high complexity decision making. I have spent a total of 50 minutes providing critical care services to this patient including but not limited to: review of labs/ microbiology/imaging/medications, serial monitoring of vital signs, adjusting ventilator settings as needed, review of other consultant's notes, review of events in the last 24 hrs, monitoring input/output, review of treatment plan with bedside nurse, RT and other treatment team, management of life support and nutrition needs. I also spoke with family about the plan of care and answered all questions.    Time spent in performing separately billable procedures and updating family is not included in the critical care time.       EDOUARD Sr   Critical Care  11/03/23   01:49 EDT       Electronically signed by Victoriano Phillip DO at 11/03/23 0815          Emergency Department Notes        Gwen Aaron RN at 11/03/23 0045          Patient belongings sent up to ICU include- claudia Ibarra wallet with patients ID and SS card. Brown boots. Socks. Pants and underwear. Shirt. Belt. Keys.     Electronically signed by Gwen Aaron, RN at 11/03/23 0644       Gwen Aaron, RN at 11/03/23 0044          Per MD, he spoke with the sister who witnessed the arrest. Sister said the weed he was smoking didn't smell  like the weed he was used to and pt had been drinking excessively. Sister states she had a fentanyl test kit at home and the weed tested positive for fentanyl    Electronically signed by Gwen Aaron, RN at 11/03/23 0046       Gwen Aaron, RN at 11/03/23 0043          Nursing report ED to floor  Yusuf Jin  52 y.o.  male    HPI:   Chief Complaint   Patient presents with    Cardiac Arrest       Admitting doctor:   Victoriano Phillip DO    Admitting diagnosis:   The primary encounter diagnosis was History of sudden cardiac arrest successfully resuscitated. Diagnoses of Anoxic brain injury and Accidental fentanyl overdose, initial encounter were also pertinent to this visit.    Code status:   Current Code Status       Date Active Code Status Order ID Comments User Context       Not on file            Allergies:   Patient has no allergy information on record.    Isolation:  No active isolations     Fall Risk:  Fall Risk Assessment was completed, and patient is at moderate risk for falls.   Predictive Model Details         11 (Low) Factor Value    Calculated 11/3/2023 00:43 Age 52    Risk of Fall Model Musculoskeletal Assessment WDL     Active Peripheral IV Present     Imaging order in this encounter Present     Respiratory Rate 25     Number of Distinct Medication Classes administered 7     Diastolic BP 48     Magnesium 2.4 mg/dL     Drug Use Not Asked     Skin Assessment WDL     Ugo Scale not on file     Peripheral Vascular Assessment WDL     Tobacco Use Not Asked     ALT 93 U/L     Financial Class Medicaid     Clinically Relevant Sex Not Female     Number of administrations of Ulcer Drugs 1     Albumin 3.9 g/dL     Gastrointestinal Assessment WDL     Calcium 9.1 mg/dL     Creatinine 1.33 mg/dL     Chloride 102 mmol/L     Cardiac Assessment WDL     Days after Admission 0.056     Potassium 3.9 mmol/L     Total Bilirubin 0.2 mg/dL         Weight:       11/02/23  0228   Weight: 72 kg (158 lb 11.7 oz)        Intake and Output  No intake or output data in the 24 hours ending 11/03/23 0043    Diet:        Most recent vitals:   Vitals:    11/03/23 0021 11/03/23 0024 11/03/23 0027 11/03/23 0030   BP:    105/48   Pulse: 108 108 108 108   Resp:       Temp:       TempSrc:       SpO2: 99% 98% 97% 99%   Weight:       Height:           Active LDAs/IV Access:   Lines, Drains & Airways       Active LDAs       Name Placement date Placement time Site Days    Peripheral IV 11/02/23 2306 Anterior;Right Forearm 11/02/23 2306  Forearm  less than 1    Peripheral IV 11/03/23 0014 Anterior;Left;Upper Arm 11/03/23 0014  Arm  less than 1    Peripheral IV 11/03/23 0014 Anterior;Distal;Left Forearm 11/03/23 0014  Forearm  less than 1    ETT  11/02/23 2307  -- less than 1                    Skin Condition:   Skin Assessments (last day)       Date/Time Device Skin Pressure Protection    11/02/23 2328 skin-to-device areas padded             Labs (abnormal labs have a star):   Labs Reviewed   URINALYSIS W/ CULTURE IF INDICATED - Abnormal; Notable for the following components:       Result Value    Blood, UA Trace (*)     All other components within normal limits    Narrative:     In absence of clinical symptoms, the presence of pyuria, bacteria, and/or nitrites on the urinalysis result does not correlate with infection.   COMPREHENSIVE METABOLIC PANEL - Abnormal; Notable for the following components:    Glucose 266 (*)     Creatinine 1.33 (*)     CO2 21.0 (*)     ALT (SGPT) 93 (*)     AST (SGOT) 154 (*)     Anion Gap 19.0 (*)     All other components within normal limits    Narrative:     GFR Normal >60  Chronic Kidney Disease <60  Kidney Failure <15     CBC WITH AUTO DIFFERENTIAL - Abnormal; Notable for the following components:    RBC 4.11 (*)     MCV 99.0 (*)     MCH 33.1 (*)     All other components within normal limits   URINALYSIS, MICROSCOPIC ONLY - Abnormal; Notable for the following components:    Sperm, UA Occasional (*)     All  other components within normal limits   POC LACTATE - Abnormal; Notable for the following components:    Lactate 7.7 (*)     All other components within normal limits   COVID-19 AND FLU A/B, NP SWAB IN TRANSPORT MEDIA 8-12 HR TAT - Normal    Narrative:     Fact sheet for providers: https://www.fda.gov/media/482807/download    Fact sheet for patients: https://www.fda.gov/media/856978/download    Test performed by PCR.   TROPONIN - Normal    Narrative:     High Sensitive Troponin T Reference Range:  <10.0 ng/L- Negative Female for AMI  <15.0 ng/L- Negative Male for AMI  >=10 - Abnormal Female indicating possible myocardial injury.  >=15 - Abnormal Male indicating possible myocardial injury.   Clinicians would have to utilize clinical acumen, EKG, Troponin, and serial changes to determine if it is an Acute Myocardial Infarction or myocardial injury due to an underlying chronic condition.        BNP (IN-HOUSE) - Normal    Narrative:     This assay is used as an aid in the diagnosis of individuals suspected of having heart failure. It can be used as an aid in the diagnosis of acute decompensated heart failure (ADHF) in patients presenting with signs and symptoms of ADHF to the emergency department (ED). In addition, NT-proBNP of <300 pg/mL indicates ADHF is not likely.    Age Range Result Interpretation  NT-proBNP Concentration (pg/mL:      <50             Positive            >450                   Gray                 300-450                    Negative             <300    50-75           Positive            >900                  Gray                300-900                  Negative            <300      >75             Positive            >1800                  Gray                300-1800                  Negative            <300   MAGNESIUM - Normal   ACETAMINOPHEN LEVEL - Normal    Narrative:     Acetaminophen Therapeutic Range  5-20 ug/mL      Hours after ingestion            Toxic Value    4 Hours                            150 ug/mL    8 Hours                            70 ug/mL   12 Hours                            40 ug/mL   16 Hours                            20 ug/mL    These values apply to a single ingestion only.    URINE DRUG SCREEN - Normal    Narrative:     Negative Thresholds Per Drugs Screened:    Amphetamines                 500 ng/ml  Barbiturates                 200 ng/ml  Benzodiazepines              100 ng/ml  Cocaine                      300 ng/ml  Methadone                    300 ng/ml  Opiates                      300 ng/ml  Oxycodone                    100 ng/ml  THC                           50 ng/ml    The Normal Value for all drugs tested is negative. This report includes final unconfirmed screening results to be used for medical treatment purposes only. Unconfirmed results must not be used for non-medical purposes such as employment or legal testing. Clinical consideration should be applied to any drug of abuse test, particularly when unconfirmed results are used.          All urine drugs of abuse requests without chain of custody are for medical screening purposes only.  False positives are possible.     SALICYLATE LEVEL - Normal   BLOOD CULTURE   BLOOD CULTURE   RAINBOW DRAW    Narrative:     The following orders were created for panel order [place] Draw.  Procedure                               Abnormality         Status                     ---------                               -----------         ------                     Green Top (Gel)[[id number]]                                  Final result               Lavender Top[[id number]]                                     Final result               Gold Top - SST[[id number]]                                   Final result               Light Blue Top[[id number]]                                   Final result                 Please view results for these tests on the individual orders.   BLOOD GAS, ARTERIAL   RAINBOW DRAW    Narrative:     The following  orders were created for panel order [place] Draw.  Procedure                               Abnormality         Status                     ---------                               -----------         ------                     Green Top (Gel)[[id number]]                                  In process                 Lavender Top[[id number]]                                                                Gold Top - SST[[id number]]                                                              Light Blue Top[[id number]]                                                                Please view results for these tests on the individual orders.   ETHANOL   BLOOD GAS, ARTERIAL   LACTIC ACID, REFLEX   SCAN SLIDE   HIGH SENSITIVITIY TROPONIN T 2HR   MANUAL DIFFERENTIAL   POC LACTATE   GREEN TOP   LAVENDER TOP   GOLD TOP - SST   LIGHT BLUE TOP   CBC AND DIFFERENTIAL    Narrative:     The following orders were created for panel order CBC & Differential.  Procedure                               Abnormality         Status                     ---------                               -----------         ------                     CBC Auto Differential[[id number]]        Abnormal            Preliminary result         Scan Slide[[id number]]                                       In process                   Please view results for these tests on the individual orders.   GREEN TOP   LAVENDER TOP   GOLD TOP - SST   LIGHT BLUE TOP       LOC:  patient intubated and unresponsive    Telemetry:  Critical Care    Cardiac Monitoring Ordered: yes    EKG:   ECG 12 Lead Other; cardiac arrest   Preliminary Result   HEART RATE= 82  bpm   RR Interval= 732  ms   SC Interval=   ms   P Horizontal Axis=   deg   P Front Axis=   deg   QRSD Interval= 106  ms   QT Interval= 369  ms   QTcB= 431  ms   QRS Axis= 72  deg   T Wave Axis= -64  deg   - ABNORMAL ECG -   Atrial fibrillation   Repol abnrm, severe global ischemia (LM/MVD)   No previous ECG available for comparison    Electronically Signed By:    Date and Time of Study: 2023-11-02 23:21:07          Medications Given in the ED:   Medications   sodium chloride 0.9 % flush 10 mL (has no administration in time range)   sodium chloride 0.9 % flush 10 mL (has no administration in time range)   piperacillin-tazobactam (ZOSYN) IVPB 3.375 g in 100 mL NS (CD) (3.375 g Intravenous New Bag 11/3/23 0023)   DOPamine 400 mg in 250 mL D5W infusion (15 mcg/kg/min × 72 kg Intravenous Rate/Dose Change 11/3/23 0042)   EPINEPHrine (ADRENALIN) injection (1 mg Intravenous Given 11/2/23 2336)   EPINEPHrine 5 mg in 250 mL NS infusion (0.05 mcg/kg/min × 72 kg Intravenous Given 11/2/23 2338)   sodium chloride 0.9 % bolus 2,160 mL (2,160 mL Intravenous New Bag 11/3/23 0024)   EPINEPHrine (ADRENALIN) injection (1 mg Intravenous Given 11/2/23 2314)   iopamidol (ISOVUE-370) 76 % injection 100 mL (100 mL Intravenous Given 11/3/23 0015)   amiodarone (CORDARONE) injection (300 mg Intravenous Given 11/2/23 2315)   amiodarone 360 mg in 200 mL D5W infusion (0 mg/min Intravenous Stopped 11/3/23 0042)   atropine sulfate injection (1 mg Intravenous Given 11/2/23 2330)   DOPamine 400 mg in 250 mL D5W infusion (5 mcg/kg/min × 72 kg Intravenous New Bag 11/2/23 2232)       Imaging results:  CT Angiogram Chest Pulmonary Embolism    Result Date: 11/3/2023  Impression: 1.Multifocal pneumonia. 2.No evidence of pulmonary embolism. Electronically Signed: Grey Newton MD  11/3/2023 12:22 AM EDT  Workstation ID: [id number]    CT Head Without Contrast    Result Date: 11/3/2023  Impression: 1.Gray-white differentiation is present, but poor throughout the brain and cerebellum. This could be related to anoxic brain injury. This could be further evaluated with follow-up brain MRI or serial CT as indicated. 2.No acute intracranial hemorrhage. Electronically Signed: Grey Newton MD  11/3/2023 12:20 AM EDT  Workstation ID: [id number]    XR Chest 1 View    Result Date:  11/2/2023  Impression: 1.ET tube tip 5.3 cm above the andry. 2.Right apical airspace disease. Electronically Signed: Grey Newton MD  11/2/2023 11:37 PM EDT  Workstation ID: [id number]     Social issues:   Social History     Socioeconomic History    Marital status: Unknown       NIH Stroke Scale:  Interval: (not recorded)  1a. Level of Consciousness: (not recorded)  1b. LOC Questions: (not recorded)  1c. LOC Commands: (not recorded)  2. Best Gaze: (not recorded)  3. Visual: (not recorded)  4. Facial Palsy: (not recorded)  5a. Motor Arm, Left: (not recorded)  5b. Motor Arm, Right: (not recorded)  6a. Motor Leg, Left: (not recorded)  6b. Motor Leg, Right: (not recorded)  7. Limb Ataxia: (not recorded)  8. Sensory: (not recorded)  9. Best Language: (not recorded)  10. Dysarthria: (not recorded)  11. Extinction and Inattention (formerly Neglect): (not recorded)    Total (NIH Stroke Scale): (not recorded)     Additional notable assessment information:unresponsive and intubated     Nursing report ED to floor:  IBAN Joyce RN   11/03/23 00:43 EDT     Electronically signed by Gwen Aaron RN at 11/03/23 0044       Andrew Mccauley MD at 11/02/23 2350        Procedure Orders    1. Intubation [[id number]] ordered by Andrew Mccauley MD                 Subjective   History of Present Illness  51 yo male transferred from [facility] s/p full arrest.  Patient reportedly was smoking THC and collapsed. Per EMS, this was related by a young female on the scene.  No family is available.  Patient has never been to this facility before.  BLS crew responded to scene.  No shock was advised, patient was in asystole at presentation.  Patient was intubated by me after arrival at 2305, ACLS followed, ROSC at 2311.  Patient then lost pulse with Vtach at 2313.  ROSC again 2315.  Patient then became bradycardic and lost pulse again 2335.  After one cycle of CPR, ROSC again.      Review of Systems   Unable to perform ROS:  Patient unresponsive       No past medical history on file.    Not on File    No past surgical history on file.    No family history on file.    Social History     Socioeconomic History    Marital status: Unknown           Objective   Physical Exam  Constitutional:       Comments: Unresponsive 53 yo male, GCS 3T   HENT:      Head: Normocephalic and atraumatic.   Eyes:      Comments: Pupils dilated, nonreactive bilaterally   Cardiovascular:      Comments: irregular  Pulmonary:      Comments: No spontaneous respiratory effort, clear via vent bilaterally  Abdominal:      Comments: nondistended   Musculoskeletal:      Comments: No extremity edema   Skin:     General: Skin is warm and dry.         Intubation    Date/Time: 11/2/2023 11:56 PM    Performed by: Andrew Mccauley MD  Authorized by: Andrew Mccauley MD    Consent:     Consent obtained:  Emergent situation  Pre-procedure details:     Induction agents:  None    Paralytics:  None  Procedure details:     Preoxygenation:  Bag valve mask    CPR in progress: yes      Number of attempts:  1  Successful intubation attempt details:     Intubation method:  Oral    Intubation technique: video assisted      Laryngoscope blade:  Mac 4    Tube size (mm):  7.5    Tube type:  Cuffed    Tube visualized through cords: yes    Placement assessment:     ETT at teeth/gumline (cm):  24    Tube secured with:  ETT madera    Breath sounds:  Absent over the epigastrium and equal    Placement verification: chest rise, colorimetric ETCO2, CXR verification, direct visualization and tube exhalation      CXR findings:  Appropriate position  Post-procedure details:     Procedure completion:  Tolerated            ED Course                                           Medical Decision Making  Resuscitated arrest stable for the time being stable on pressors.  Etiology unclear.  Differential would include arrhythmia versus MI versus drug overdose.  Initial toxicology is negative.  EKG without  STEMI.    Case discussed with sister, Kristine, who stated she was with her brother tonight who had been drinking alcohol and smoking THC.  Kristine tested the THC after her brother was transferred to the hospital and it was positive for fentanyl.    Critical Care Time 45 minutes.    Problems Addressed:  Anoxic brain injury: acute illness or injury  History of sudden cardiac arrest successfully resuscitated: acute illness or injury    Amount and/or Complexity of Data Reviewed  Labs: ordered.  Radiology: ordered.  ECG/medicine tests: ordered and independent interpretation performed.     Details: EKG:  irregular, rate 82, no stemi    Risk  Prescription drug management.        Final diagnoses:   History of sudden cardiac arrest successfully resuscitated   Anoxic brain injury   Accidental fentanyl overdose, initial encounter       ED Disposition  ED Disposition       ED Disposition   Decision to Admit    Condition   --    Comment   Level of Care: Critical Care [6]   Admitting Physician: LAURA MORA [[id number]]                 No follow-up provider specified.       Medication List      No changes were made to your prescriptions during this visit.            Andrew Mccauley MD  11/03/23 0041      Electronically signed by Andrew Mccauley MD at 11/03/23 0041          Operative/Procedure Notes (last 48 hours)        Arely Sarmiento APRN at 11/03/23 0541        Procedure Orders    1. Insert Arterial Line [[id number]] ordered by Arely Sarmiento APRN               Post-procedure Diagnoses    1. Anoxic brain injury [G93.1]    2. Accidental fentanyl overdose, initial encounter [T40.411A]    3. Cardiac arrest with successful resuscitation [I46.9]                 Insert Arterial Line    Date/Time: 11/3/2023 5:41 AM    Performed by: Arely Sarmiento APRN  Authorized by: Arely Sarmiento APRN    Universal Protocol:     Emergent situation      Patient identity confirmed:  Arm band, anonymous protocol, patient  vented/unresponsive and hospital-assigned identification number    Time out: Immediately prior to the procedure a time out was called    A time out verifies correct patient, procedure, equipment, support staff and site/side marked as required:   Preparation:     Preparation: Patient was prepped and draped in usual sterile fashion    Indications:     Indications: multiple ABGs, respiratory failure and hemodynamic monitoring    Location:     Location:  Right radial  Procedure Details:     Ultrasound Guidance: yes  The ultrasound was used for evaluation of possible access sites.  Vessel patency was confirmed with the ultrasound.  Needle entry into vessel was visualized in realtime with the ultrasound.   Permanent recorded image(s) of the vascular access site will be included in the patient record.      Glenroy's test normal?: Yes      Needle gauge:  20    Seldinger technique: Seldinger technique used      Number of attempts:  1  Post-procedure:     Post-procedure:  Line sutured and dressing applied    Post-procedure CMS:  Normal and unchanged     patient tolerated the procedure well with no immediate complications  Electronically signed by EDOUARD Sr, 11/03/23, 5:42 AM EDT.      Electronically signed by Arely Sarmiento APRN at 11/03/23 0542       Physician Progress Notes (last 48 hours)  Notes from 11/01/23 0934 through 11/03/23 0934   No notes of this type exist for this encounter.       Consult Notes (last 48 hours)  Notes from 11/01/23 0934 through 11/03/23 0934   No notes of this type exist for this encounter.

## 2023-11-03 NOTE — CONSULTS
Nephrology Consult Note                                                [facility]      Patient Identification:  Name: Yusuf Jin  Age: 52 y.o.  Sex: male  :  1970  MRN: [id number]               Requesting Physician: Victoriano Phillip DO  Reason for Consultation: management recommendations      History of Present Illness:     Unfortunate 52-year-old  male patient with history of cardiac arrest alcoholism drug abuse marijuana currently on 100% oxygen prone position with sepsis hypotension requiring 4 pressors on bicarb drip with severe acidosis and worsening kidney function prompting renal consultation  Problem List:    Cardiac arrest with successful resuscitation    Marijuana abuse    Alcohol abuse    Elevated blood sugar     Past Medical History:  History reviewed. No pertinent past medical history.  Past Surgical History:  No past surgical history on file.   Home Meds:  No medications prior to admission.     Current Meds:     Current Facility-Administered Medications:     acetaminophen (TYLENOL) tablet 650 mg, 650 mg, Oral, Q4H PRN **OR** acetaminophen (TYLENOL) suppository 650 mg, 650 mg, Rectal, Q4H PRN, Arely Sarmiento, EDOUARD    artificial tears ophthalmic ointment, , Both Eyes, Q4H, Arely Sarmiento APRN, Given at 23 0834    artificial tears ophthalmic ointment, , Both Eyes, PRN, Arely Sarmiento, APRJODI    sennosides-docusate (PERICOLACE) 8.6-50 MG per tablet 2 tablet, 2 tablet, Oral, BID PRN **AND** polyethylene glycol (MIRALAX) packet 17 g, 17 g, Oral, Daily PRN **AND** bisacodyl (DULCOLAX) EC tablet 5 mg, 5 mg, Oral, Daily PRN **AND** bisacodyl (DULCOLAX) suppository 10 mg, 10 mg, Rectal, Daily PRN, Jed Evans APRN    Calcium Replacement - Follow Nurse / BPA Driven Protocol, , Does not apply, PRN, Arely Sarmiento APRJODI    chlorhexidine (PERIDEX) 0.12 % solution 15 mL, 15 mL, Mouth/Throat, Q12H, Arely Sarmiento APRN, 15 mL at  11/03/23 0225    dextrose (D50W) (25 g/50 mL) IV injection 25 g, 25 g, Intravenous, Q15 Min PRN, Arely Sarmiento APRN, 25 g at 11/03/23 0823    dextrose (GLUTOSE) oral gel 15 g, 15 g, Oral, Q15 Min PRN, Arely Sarmiento APRN    dextrose 10 % infusion, 50 mL/hr, Intravenous, Continuous, Jed Evans APRN    EPINEPHrine 10 mg in 250 mL NS infusion, 0.02-0.5 mcg/kg/min, Intravenous, Titrated, Jed Evans APRJODI    epoprostenol (VELETRI) 1.5mg in 50mL NS (65731 ng/mL) nebulization, 50 ng/kg/min (Ideal), Inhalation, Continuous, Arely Sarmiento APRN, Last Rate: 7.07 mL/hr at 11/03/23 0855, 50 ng/kg/min at 11/03/23 0855    fentaNYL 2500 mcg in 250 mL NS infusion,  mcg/hr, Intravenous, Titrated, Arely Sarmiento APRN, Last Rate: 5 mL/hr at 11/03/23 0318, 50 mcg/hr at 11/03/23 0318    fentaNYL citrate (PF) (SUBLIMAZE) injection 50 mcg, 50 mcg, Intravenous, Q1H PRN, Arely Sarmiento APRN, 50 mcg at 11/03/23 0316    glucagon (GLUCAGEN) injection 1 mg, 1 mg, Intramuscular, Q15 Min PRN, Arely Sarmiento APRN    insulin lispro (HUMALOG/ADMELOG) injection 2-7 Units, 2-7 Units, Subcutaneous, Q6H, Arely Sarmienot APRN, 3 Units at 11/03/23 0244    ipratropium-albuterol (DUO-NEB) nebulizer solution 3 mL, 3 mL, Nebulization, Q6H PRN, Arely Sarmiento APRN    Magnesium Low Dose Replacement - Follow Nurse / BPA Driven Protocol, , Does not apply, PRN, Arely Sarmiento APRJODI    midazolam (VERSED) 100 mg in 100mL NS infusion, 1-10 mg/hr, Intravenous, Titrated, Arely Sarmiento APRN, Last Rate: 2 mL/hr at 11/03/23 0317, 2 mg/hr at 11/03/23 0317    nitroglycerin (NITROSTAT) SL tablet 0.4 mg, 0.4 mg, Sublingual, Q5 Min PRN, Arely Sarmiento APRN    norepinephrine (LEVOPHED) 16 mg in 250 mL NS infusion, 0.02-0.5 mcg/kg/min, Intravenous, Titrated, Jed Evans APRN    ondansetron (ZOFRAN) tablet 4 mg, 4 mg, Oral, Q6H PRN **OR** ondansetron (ZOFRAN) injection 4 mg, 4 mg, Intravenous, Q6H PRN, Arely Sarmiento APRN     pantoprazole (PROTONIX) injection 40 mg, 40 mg, Intravenous, Q12H, Jed Evans APRN, 40 mg at 11/03/23 0835    phenylephrine (SHARLA-SYNEPHRINE) 100 mg in 250 mL NS infusion, 0.5-6 mcg/kg/min, Intravenous, Titrated, Jed Evans, APRN    Phosphorus Replacement - Follow Nurse / BPA Driven Protocol, , Does not apply, PRN, Arely Sarmiento APRJODI    piperacillin-tazobactam (ZOSYN) IVPB 3.375 g in 100 mL NS (CD), 3.375 g, Intravenous, Q8H, Arely Sarmiento APRN, 3.375 g at 11/03/23 0528    Potassium Replacement - Follow Nurse / BPA Driven Protocol, , Does not apply, PRN, Arely Sarmiento APRJODI    sodium bicarbonate 8.4 % 150 mEq in dextrose (D5W) 5 % 1,000 mL infusion (greater than 75 mEq), 150 mEq, Intravenous, Continuous, Arely Sarmiento APRJODI, Last Rate: 100 mL/hr at 11/03/23 0501, 150 mEq at 11/03/23 0501    sodium chloride 0.9 % flush 10 mL, 10 mL, Intravenous, PRN, Andrew Mccauley MD    sodium chloride 0.9 % flush 10 mL, 10 mL, Intravenous, PRN, Andrew Mccauley MD    sodium chloride 0.9 % flush 10 mL, 10 mL, Intravenous, Q12H, Arely Sarmiento, APRN, 10 mL at 11/03/23 0845    sodium chloride 0.9 % flush 10 mL, 10 mL, Intravenous, PRN, Arely Sarmiento APRN    sodium chloride 0.9 % flush 10 mL, 10 mL, Intravenous, Q12H, Victoriano Phillip, DO, 10 mL at 11/03/23 0845    sodium chloride 0.9 % flush 10 mL, 10 mL, Intravenous, Q12H, Victoriano Phillip, DO, 10 mL at 11/03/23 0845    sodium chloride 0.9 % flush 10 mL, 10 mL, Intravenous, Q12H, Victoriano Phillip, DO, 10 mL at 11/03/23 0845    sodium chloride 0.9 % flush 10 mL, 10 mL, Intravenous, PRN, Victoriano Phillip, DO    sodium chloride 0.9 % flush 20 mL, 20 mL, Intravenous, PRN, Victoriano Phillip, DO    sodium chloride 0.9 % infusion 40 mL, 40 mL, Intravenous, PRN, Arely Sarmiento, APRN    sodium chloride 0.9 % infusion 40 mL, 40 mL, Intravenous, PRN, Victoriano Phillip, DO    Vasopressin (VASOSTRICT) 0.2 UNIT/ML solution, 0.01-0.1  "Units/min, Intravenous, Continuous, Jed Evans APRN, Last Rate: 9 mL/hr at 11/03/23 0830, 0.03 Units/min at 11/03/23 0830    vecuronium (NORCURON) 100 mg in sodium chloride 0.9 % 100 mL infusion, 0.6-1.7 mcg/kg/min, Intravenous, Titrated, Arely Sarmiento APRN, Last Rate: 2.7 mL/hr at 11/03/23 0410, 0.6 mcg/kg/min at 11/03/23 0410    vecuronium (NORCURON) bolus from bag 1 mg/mL 7.49 mg, 100 mcg/kg, Intravenous, Once, Arely Sarmiento APRN    Allergies:  Not on File  Social History:   Social History     Tobacco Use    Smoking status: Not on file    Smokeless tobacco: Not on file   Substance Use Topics    Alcohol use: Not on file      Family History:  History reviewed. No pertinent family history.     Review of Systems   unobtainable    Objective:  Vitals:   /64   Pulse 105   Temp 93.9 °F (34.4 °C) (Rectal)   Resp (!) 30   Ht 175.3 cm (69\")   Wt 74.9 kg (165 lb 2 oz)   SpO2 95%   BMI 24.38 kg/m²   I/O:     Intake/Output Summary (Last 24 hours) at 11/3/2023 0900  Last data filed at 11/3/2023 0617  Gross per 24 hour   Intake 733 ml   Output 150 ml   Net 583 ml       Exam:  General Appearance:   on the vent multiple pressors prone position  Head:  Normocephalic, without obvious abnormality, atraumatic  Eyes:   unable to examine prone position   Neck:   no adenopathy;      Lungs:  Decreased BS occasion ronchi  Heart:   prone position  Abdomen:   prone position  Extremities: trace edema  Pulses: 2+ and symmetric all extremities  Skin:  No rashes or lesions  Data Review:  All labs (24hrs):   Recent Results (from the past 24 hour(s))   ECG 12 Lead Other; cardiac arrest    Collection Time: 11/02/23 11:21 PM   Result Value Ref Range    QT Interval 369 ms    QTC Interval 431 ms   Green Top (Gel)    Collection Time: 11/02/23 11:23 PM   Result Value Ref Range    Extra Tube Hold for add-ons.    Lavender Top    Collection Time: 11/02/23 11:23 PM   Result Value Ref Range    Extra Tube hold for add-on    Gold Top " - SST    Collection Time: 11/02/23 11:23 PM   Result Value Ref Range    Extra Tube Hold for add-ons.    Light Blue Top    Collection Time: 11/02/23 11:23 PM   Result Value Ref Range    Extra Tube Hold for add-ons.    High Sensitivity Troponin T    Collection Time: 11/02/23 11:23 PM    Specimen: Blood   Result Value Ref Range    HS Troponin T 10 <15 ng/L   BNP    Collection Time: 11/02/23 11:23 PM    Specimen: Blood   Result Value Ref Range    proBNP <36.0 0.0 - 900.0 pg/mL   Comprehensive Metabolic Panel    Collection Time: 11/02/23 11:23 PM    Specimen: Blood   Result Value Ref Range    Glucose 266 (H) 65 - 99 mg/dL    BUN 11 6 - 20 mg/dL    Creatinine 1.33 (H) 0.76 - 1.27 mg/dL    Sodium 142 136 - 145 mmol/L    Potassium 3.9 3.5 - 5.2 mmol/L    Chloride 102 98 - 107 mmol/L    CO2 21.0 (L) 22.0 - 29.0 mmol/L    Calcium 9.1 8.6 - 10.5 mg/dL    Total Protein 6.3 6.0 - 8.5 g/dL    Albumin 3.9 3.5 - 5.2 g/dL    ALT (SGPT) 93 (H) 1 - 41 U/L    AST (SGOT) 154 (H) 1 - 40 U/L    Alkaline Phosphatase 65 39 - 117 U/L    Total Bilirubin 0.2 0.0 - 1.2 mg/dL    Globulin 2.4 gm/dL    A/G Ratio 1.6 g/dL    BUN/Creatinine Ratio 8.3 7.0 - 25.0    Anion Gap 19.0 (H) 5.0 - 15.0 mmol/L    eGFR 63.9 >60.0 mL/min/1.73   CBC Auto Differential    Collection Time: 11/02/23 11:23 PM    Specimen: Blood   Result Value Ref Range    WBC 7.50 3.40 - 10.80 10*3/mm3    RBC 4.11 (L) 4.14 - 5.80 10*6/mm3    Hemoglobin 13.6 13.0 - 17.7 g/dL    Hematocrit 40.7 37.5 - 51.0 %    MCV 99.0 (H) 79.0 - 97.0 fL    MCH 33.1 (H) 26.6 - 33.0 pg    MCHC 33.4 31.5 - 35.7 g/dL    RDW 13.1 12.3 - 15.4 %    RDW-SD 45.9 37.0 - 54.0 fl    MPV 7.2 6.0 - 12.0 fL    Platelets 229 140 - 450 10*3/mm3   Magnesium    Collection Time: 11/02/23 11:23 PM    Specimen: Blood   Result Value Ref Range    Magnesium 2.4 1.6 - 2.6 mg/dL   Acetaminophen Level    Collection Time: 11/02/23 11:23 PM    Specimen: Blood   Result Value Ref Range    Acetaminophen <5.0 0.0 - 30.0 mcg/mL    Ethanol    Collection Time: 11/02/23 11:23 PM    Specimen: Blood   Result Value Ref Range    Ethanol % 0.256 %   Salicylate Level    Collection Time: 11/02/23 11:23 PM    Specimen: Blood   Result Value Ref Range    Salicylate 0.4 <=30.0 mg/dL   Scan Slide    Collection Time: 11/02/23 11:23 PM    Specimen: Blood   Result Value Ref Range    Scan Slide     Manual Differential    Collection Time: 11/02/23 11:23 PM    Specimen: Blood   Result Value Ref Range    Neutrophil % 31.0 (L) 42.7 - 76.0 %    Lymphocyte % 59.0 (H) 19.6 - 45.3 %    Monocyte % 4.0 (L) 5.0 - 12.0 %    Bands %  1.0 0.0 - 5.0 %    Myelocyte % 1.0 (H) 0.0 - 0.0 %    Atypical Lymphocyte % 4.0 0.0 - 5.0 %    Neutrophils Absolute 2.40 1.70 - 7.00 10*3/mm3    Lymphocytes Absolute 4.73 (H) 0.70 - 3.10 10*3/mm3    Monocytes Absolute 0.30 0.10 - 0.90 10*3/mm3    RBC Morphology Normal Normal    WBC Morphology Normal Normal    Platelet Morphology Normal Normal   Urinalysis With Culture If Indicated - Urine, Catheter    Collection Time: 11/02/23 11:33 PM    Specimen: Urine, Catheter   Result Value Ref Range    Color, UA Yellow Yellow, Straw    Appearance, UA Clear Clear    pH, UA 6.0 5.0 - 8.0    Specific Gravity, UA <=1.005 1.005 - 1.030    Glucose, UA Negative Negative    Ketones, UA Negative Negative    Bilirubin, UA Negative Negative    Blood, UA Trace (A) Negative    Protein, UA Negative Negative    Leuk Esterase, UA Negative Negative    Nitrite, UA Negative Negative    Urobilinogen, UA 0.2 E.U./dL 0.2 - 1.0 E.U./dL   Urinalysis, Microscopic Only - Urine, Catheter    Collection Time: 11/02/23 11:33 PM    Specimen: Urine, Catheter   Result Value Ref Range    RBC, UA None Seen None Seen, 0-2 /HPF    WBC, UA 0-2 None Seen, 0-2 /HPF    Bacteria, UA None Seen None Seen /HPF    Squamous Epithelial Cells, UA 0-2 None Seen, 0-2 /HPF    Hyaline Casts, UA 0-2 None Seen /LPF    Sperm, UA Occasional (A) None Seen /HPF    Methodology Manual Light Microscopy    Blood  Gas, Arterial -    Collection Time: 11/02/23 11:45 PM    Specimen: Arterial Blood   Result Value Ref Range    Site Right Radial     Glenroy's Test Positive     pH, Arterial 6.913 (C) 7.350 - 7.450 pH units    pCO2, Arterial 83.0 (C) 35.0 - 48.0 mm Hg    pO2, Arterial 194.1 (H) 83.0 - 108.0 mm Hg    HCO3, Arterial 16.7 (L) 21.0 - 28.0 mmol/L    Base Excess, Arterial -16.9 (L) 0.0 - 3.0 mmol/L    O2 Saturation, Arterial 98.5 (H) 94.0 - 98.0 %    CO2 Content 19.3 (L) 22 - 29 mmol/L    Barometric Pressure for Blood Gas      Modality Adult Vent     FIO2 100 %    Ventilator Mode AC     Hemodilution No    POC Lactate    Collection Time: 11/02/23 11:45 PM    Specimen: Arterial Blood   Result Value Ref Range    Lactate 7.7 (C) 0.2 - 2.0 mmol/L   Urine Drug Screen - Urine, Clean Catch    Collection Time: 11/02/23 11:47 PM    Specimen: Urine, Clean Catch   Result Value Ref Range    Amphet/Methamphet, Screen Negative Negative    Barbiturates Screen, Urine Negative Negative    Benzodiazepine Screen, Urine Negative Negative    Cocaine Screen, Urine Negative Negative    Opiate Screen Negative Negative    THC, Screen, Urine Negative Negative    Methadone Screen, Urine Negative Negative    Oxycodone Screen, Urine Negative Negative   COVID-19 and FLU A/B PCR - Swab, Nasopharynx    Collection Time: 11/02/23 11:54 PM    Specimen: Nasopharynx; Swab   Result Value Ref Range    COVID19 Not Detected Not Detected - Ref. Range    Influenza A PCR Not Detected Not Detected    Influenza B PCR Not Detected Not Detected   Green Top (Gel)    Collection Time: 11/03/23 12:11 AM   Result Value Ref Range    Extra Tube d    POC Creatinine    Collection Time: 11/03/23  1:51 AM    Specimen: Arterial Blood   Result Value Ref Range    Creatinine 1.07 mg/dL    eGFR 83.0 >60.0 mL/min/1.73   Blood Gas, Arterial -    Collection Time: 11/03/23  1:51 AM    Specimen: Arterial Blood   Result Value Ref Range    Site Left Radial     Glenroy's Test Positive     pH,  Arterial 7.137 (C) 7.350 - 7.450 pH units    pCO2, Arterial 42.1 35.0 - 48.0 mm Hg    pO2, Arterial 70.9 (L) 83.0 - 108.0 mm Hg    HCO3, Arterial 14.2 (L) 21.0 - 28.0 mmol/L    Base Excess, Arterial -14.4 (L) 0.0 - 3.0 mmol/L    O2 Saturation, Arterial 88.0 (L) 94.0 - 98.0 %    Barometric Pressure for Blood Gas      Modality Adult Vent     FIO2 60 %    Ventilator Mode AC     Set Tidal Volume 500     PEEP 5     Hemodilution No     Respiratory Rate 24    POCT Electrolytes +HGB +HCT    Collection Time: 11/03/23  1:51 AM    Specimen: Arterial Blood   Result Value Ref Range    Sodium 143 138 - 146 mmol/L    POC Potassium 2.9 (L) 3.5 - 4.5 mmol/L    Ionized Calcium 1.06 (L) 1.15 - 1.33 mmol/L    Glucose 243 (H) 74 - 100 mg/dL    Hematocrit 43 38 - 51 %    Hemoglobin 14.6 12.0 - 17.0 g/dL   POC Lactate    Collection Time: 11/03/23  1:51 AM    Specimen: Arterial Blood   Result Value Ref Range    Lactate 7.1 (C) 0.2 - 2.0 mmol/L   POC Glucose Once    Collection Time: 11/03/23  1:51 AM    Specimen: Arterial Blood   Result Value Ref Range    Glucose 243 (H) 74 - 100 mg/dL   STAT Lactic Acid, Reflex    Collection Time: 11/03/23  2:31 AM    Specimen: Blood   Result Value Ref Range    Lactate 6.7 (C) 0.5 - 2.0 mmol/L   High Sensitivity Troponin T 2Hr    Collection Time: 11/03/23  2:31 AM    Specimen: Blood   Result Value Ref Range    HS Troponin T 298 (C) <15 ng/L    Troponin T Delta 288 (C) >=-4 - <+4 ng/L   Respiratory Culture - Sputum, ET Suction    Collection Time: 11/03/23  2:43 AM    Specimen: ET Suction; Sputum   Result Value Ref Range    Gram Stain Many (4+) WBCs per low power field     Gram Stain Rare (1+) Epithelial cells per low power field     Gram Stain       Few (2+) Mixed bacterial morphotypes seen on Gram Stain    Gram Stain Rare (1+) Yeast    POC Creatinine    Collection Time: 11/03/23  3:59 AM    Specimen: Arterial Blood   Result Value Ref Range    Creatinine 1.35 mg/dL    eGFR 62.8 >60.0 mL/min/1.73   Blood  Gas, Arterial -    Collection Time: 11/03/23  3:59 AM    Specimen: Arterial Blood   Result Value Ref Range    Site Left Brachial     Glenroy's Test N/A     pH, Arterial 7.028 (C) 7.350 - 7.450 pH units    pCO2, Arterial 61.6 (H) 35.0 - 48.0 mm Hg    pO2, Arterial 55.1 (L) 83.0 - 108.0 mm Hg    HCO3, Arterial 16.2 (L) 21.0 - 28.0 mmol/L    Base Excess, Arterial -15.4 (L) 0.0 - 3.0 mmol/L    O2 Saturation, Arterial 71.4 (L) 94.0 - 98.0 %    Barometric Pressure for Blood Gas      Modality Adult Vent     FIO2 100 %    Ventilator Mode PC     PEEP 10     PSV 36 cmH2O    Hemodilution No     Respiratory Rate 24     Inspiratory Time 1    POCT Electrolytes +HGB +HCT    Collection Time: 11/03/23  3:59 AM    Specimen: Arterial Blood   Result Value Ref Range    Sodium 147 (H) 138 - 146 mmol/L    POC Potassium 3.0 (L) 3.5 - 4.5 mmol/L    Ionized Calcium 1.18 1.15 - 1.33 mmol/L    Glucose 96 74 - 100 mg/dL    Hematocrit 44 38 - 51 %    Hemoglobin 15.1 12.0 - 17.0 g/dL   POC Lactate    Collection Time: 11/03/23  3:59 AM    Specimen: Arterial Blood   Result Value Ref Range    Lactate 6.9 (C) 0.2 - 2.0 mmol/L   POC Glucose Once    Collection Time: 11/03/23  3:59 AM    Specimen: Arterial Blood   Result Value Ref Range    Glucose 96 74 - 100 mg/dL   Blood Gas, Arterial -    Collection Time: 11/03/23  4:24 AM    Specimen: Arterial Blood   Result Value Ref Range    Site Arterial Line     Glenroy's Test N/A     pH, Arterial 6.943 (C) 7.350 - 7.450 pH units    pCO2, Arterial 74.7 (C) 35.0 - 48.0 mm Hg    pO2, Arterial 69.7 (L) 83.0 - 108.0 mm Hg    HCO3, Arterial 16.1 (L) 21.0 - 28.0 mmol/L    Base Excess, Arterial -17.8 (L) 0.0 - 3.0 mmol/L    O2 Saturation, Arterial 78.8 (L) 94.0 - 98.0 %    CO2 Content 18.4 (L) 22 - 29 mmol/L    Barometric Pressure for Blood Gas      Modality Adult Vent     FIO2 100 %    Ventilator Mode PC     PEEP 10     PSV 36 cmH2O    Hemodilution No     Respiratory Rate 24     Inspiratory Time 1    Magnesium     Collection Time: 11/03/23  5:36 AM    Specimen: Blood   Result Value Ref Range    Magnesium 1.9 1.6 - 2.6 mg/dL   Phosphorus    Collection Time: 11/03/23  5:36 AM    Specimen: Blood   Result Value Ref Range    Phosphorus 4.0 2.5 - 4.5 mg/dL   Comprehensive Metabolic Panel    Collection Time: 11/03/23  5:36 AM    Specimen: Blood   Result Value Ref Range    Glucose 55 (L) 65 - 99 mg/dL    BUN 14 6 - 20 mg/dL    Creatinine 2.15 (H) 0.76 - 1.27 mg/dL    Sodium 147 (H) 136 - 145 mmol/L    Potassium 3.1 (L) 3.5 - 5.2 mmol/L    Chloride 113 (H) 98 - 107 mmol/L    CO2 17.0 (L) 22.0 - 29.0 mmol/L    Calcium 8.3 (L) 8.6 - 10.5 mg/dL    Total Protein 5.5 (L) 6.0 - 8.5 g/dL    Albumin 3.5 3.5 - 5.2 g/dL    ALT (SGPT) 163 (H) 1 - 41 U/L    AST (SGOT) 433 (H) 1 - 40 U/L    Alkaline Phosphatase 79 39 - 117 U/L    Total Bilirubin 0.4 0.0 - 1.2 mg/dL    Globulin 2.0 gm/dL    A/G Ratio 1.8 g/dL    BUN/Creatinine Ratio 6.5 (L) 7.0 - 25.0    Anion Gap 17.0 (H) 5.0 - 15.0 mmol/L    eGFR 35.9 (L) >60.0 mL/min/1.73   TSH    Collection Time: 11/03/23  5:36 AM    Specimen: Blood   Result Value Ref Range    TSH 1.920 0.270 - 4.200 uIU/mL   CBC Auto Differential    Collection Time: 11/03/23  5:36 AM    Specimen: Blood   Result Value Ref Range    WBC 2.80 (L) 3.40 - 10.80 10*3/mm3    RBC 4.89 4.14 - 5.80 10*6/mm3    Hemoglobin 16.1 13.0 - 17.7 g/dL    Hematocrit 47.6 37.5 - 51.0 %    MCV 97.3 (H) 79.0 - 97.0 fL    MCH 32.9 26.6 - 33.0 pg    MCHC 33.8 31.5 - 35.7 g/dL    RDW 13.0 12.3 - 15.4 %    RDW-SD 46.8 37.0 - 54.0 fl    MPV 6.7 6.0 - 12.0 fL    Platelets 242 140 - 450 10*3/mm3    Neutrophil % 51.1 42.7 - 76.0 %    Lymphocyte % 43.8 19.6 - 45.3 %    Monocyte % 4.1 (L) 5.0 - 12.0 %    Eosinophil % 0.5 0.3 - 6.2 %    Basophil % 0.5 0.0 - 1.5 %    Neutrophils, Absolute 1.40 (L) 1.70 - 7.00 10*3/mm3    Lymphocytes, Absolute 1.20 0.70 - 3.10 10*3/mm3    Monocytes, Absolute 0.10 0.10 - 0.90 10*3/mm3    Eosinophils, Absolute 0.00 0.00 - 0.40  10*3/mm3    Basophils, Absolute 0.00 0.00 - 0.20 10*3/mm3    nRBC 0.8 (H) 0.0 - 0.2 /100 WBC   Hemoglobin A1c    Collection Time: 11/03/23  5:36 AM    Specimen: Blood   Result Value Ref Range    Hemoglobin A1C 5.10 4.80 - 5.60 %   POC Glucose Once    Collection Time: 11/03/23  6:20 AM    Specimen: Blood   Result Value Ref Range    Glucose 48 (C) 70 - 105 mg/dL   POC Glucose Once    Collection Time: 11/03/23  6:39 AM    Specimen: Blood   Result Value Ref Range    Glucose 198 (H) 70 - 105 mg/dL   POC Glucose Once    Collection Time: 11/03/23  8:20 AM    Specimen: Blood   Result Value Ref Range    Glucose 73 70 - 105 mg/dL   POC Glucose Once    Collection Time: 11/03/23  8:40 AM    Specimen: Blood   Result Value Ref Range    Glucose 209 (H) 70 - 105 mg/dL       Current Facility-Administered Medications:     acetaminophen (TYLENOL) tablet 650 mg, 650 mg, Oral, Q4H PRN **OR** acetaminophen (TYLENOL) suppository 650 mg, 650 mg, Rectal, Q4H PRN, Arely Sarmiento APRN    artificial tears ophthalmic ointment, , Both Eyes, Q4H, Arely Sarmiento APRN, Given at 11/03/23 0834    artificial tears ophthalmic ointment, , Both Eyes, PRN, Arely Sarmiento APRN    sennosides-docusate (PERICOLACE) 8.6-50 MG per tablet 2 tablet, 2 tablet, Oral, BID PRN **AND** polyethylene glycol (MIRALAX) packet 17 g, 17 g, Oral, Daily PRN **AND** bisacodyl (DULCOLAX) EC tablet 5 mg, 5 mg, Oral, Daily PRN **AND** bisacodyl (DULCOLAX) suppository 10 mg, 10 mg, Rectal, Daily PRN, Jed Evans APRN    Calcium Replacement - Follow Nurse / BPA Driven Protocol, , Does not apply, PRN, Arely Sarmiento APRN    chlorhexidine (PERIDEX) 0.12 % solution 15 mL, 15 mL, Mouth/Throat, Q12H, Arely Sarmiento APRN, 15 mL at 11/03/23 0225    dextrose (D50W) (25 g/50 mL) IV injection 25 g, 25 g, Intravenous, Q15 Min PRN, Arely Sarmiento APRN, 25 g at 11/03/23 0823    dextrose (GLUTOSE) oral gel 15 g, 15 g, Oral, Q15 Min PRN, Arely Sarmiento APRN    dextrose  10 % infusion, 50 mL/hr, Intravenous, Continuous, Jed Evans APRN    EPINEPHrine 10 mg in 250 mL NS infusion, 0.02-0.5 mcg/kg/min, Intravenous, Titrated, Jed Evans APRN    epoprostenol (VELETRI) 1.5mg in 50mL NS (29303 ng/mL) nebulization, 50 ng/kg/min (Ideal), Inhalation, Continuous, Arely Sarmiento APRN, Last Rate: 7.07 mL/hr at 11/03/23 0855, 50 ng/kg/min at 11/03/23 0855    fentaNYL 2500 mcg in 250 mL NS infusion,  mcg/hr, Intravenous, Titrated, Arely Sarmiento APRN, Last Rate: 5 mL/hr at 11/03/23 0318, 50 mcg/hr at 11/03/23 0318    fentaNYL citrate (PF) (SUBLIMAZE) injection 50 mcg, 50 mcg, Intravenous, Q1H PRN, Arely Sarmiento APRN, 50 mcg at 11/03/23 0316    glucagon (GLUCAGEN) injection 1 mg, 1 mg, Intramuscular, Q15 Min PRN, Arely Sarmiento APRN    insulin lispro (HUMALOG/ADMELOG) injection 2-7 Units, 2-7 Units, Subcutaneous, Q6H, Arely Sarmiento APRN, 3 Units at 11/03/23 0244    ipratropium-albuterol (DUO-NEB) nebulizer solution 3 mL, 3 mL, Nebulization, Q6H PRN, Arely Sarmiento APRN    Magnesium Low Dose Replacement - Follow Nurse / BPA Driven Protocol, , Does not apply, PRN, Arely Sarmiento APRN    midazolam (VERSED) 100 mg in 100mL NS infusion, 1-10 mg/hr, Intravenous, Titrated, Arely Sarmiento APRN, Last Rate: 2 mL/hr at 11/03/23 0317, 2 mg/hr at 11/03/23 0317    nitroglycerin (NITROSTAT) SL tablet 0.4 mg, 0.4 mg, Sublingual, Q5 Min PRN, Arely Sarmiento APRN    norepinephrine (LEVOPHED) 16 mg in 250 mL NS infusion, 0.02-0.5 mcg/kg/min, Intravenous, Titrated, Jed Evans APRN    ondansetron (ZOFRAN) tablet 4 mg, 4 mg, Oral, Q6H PRN **OR** ondansetron (ZOFRAN) injection 4 mg, 4 mg, Intravenous, Q6H PRN, Arely Sarmiento APRN    pantoprazole (PROTONIX) injection 40 mg, 40 mg, Intravenous, Q12H, Jed Evans APRN, 40 mg at 11/03/23 0835    phenylephrine (SHARLA-SYNEPHRINE) 100 mg in 250 mL NS infusion, 0.5-6 mcg/kg/min, Intravenous, Titrated, Jed Evans,  APRN    Phosphorus Replacement - Follow Nurse / BPA Driven Protocol, , Does not apply, PRN, Torsten Arely, APRN    piperacillin-tazobactam (ZOSYN) IVPB 3.375 g in 100 mL NS (CD), 3.375 g, Intravenous, Q8H, Arely Sarmiento, APRN, 3.375 g at 11/03/23 0528    Potassium Replacement - Follow Nurse / BPA Driven Protocol, , Does not apply, PRN, Sarmiento Arely, APRN    sodium bicarbonate 8.4 % 150 mEq in dextrose (D5W) 5 % 1,000 mL infusion (greater than 75 mEq), 150 mEq, Intravenous, Continuous, Torsten Arely, APRN, Last Rate: 100 mL/hr at 11/03/23 0501, 150 mEq at 11/03/23 0501    sodium chloride 0.9 % flush 10 mL, 10 mL, Intravenous, PRN, Andrew Mccauley MD    sodium chloride 0.9 % flush 10 mL, 10 mL, Intravenous, PRN, Andrew Mccauley MD    sodium chloride 0.9 % flush 10 mL, 10 mL, Intravenous, Q12H, Sarm[place], APRN, 10 mL at 11/03/23 0845    sodium chloride 0.9 % flush 10 mL, 10 mL, Intravenous, PRN, Torsten Arely, APRN    sodium chloride 0.9 % flush 10 mL, 10 mL, Intravenous, Q12H, Victoriano Phillip L, DO, 10 mL at 11/03/23 0845    sodium chloride 0.9 % flush 10 mL, 10 mL, Intravenous, Q12H, Jose Philliper L, DO, 10 mL at 11/03/23 0845    sodium chloride 0.9 % flush 10 mL, 10 mL, Intravenous, Q12H, [place], Tremaineopher L, DO, 10 mL at 11/03/23 0845    sodium chloride 0.9 % flush 10 mL, 10 mL, Intravenous, PRN, Victoriano Phillip L, DO    sodium chloride 0.9 % flush 20 mL, 20 mL, Intravenous, PRN, Tremaine Phillipopher L, DO    sodium chloride 0.9 % infusion 40 mL, 40 mL, Intravenous, PRN, Sarmiento Arely, APRN    sodium chloride 0.9 % infusion 40 mL, 40 mL, Intravenous, PRN, Victoriano Phillip,     Vasopressin (VASOSTRICT) 0.2 UNIT/ML solution, 0.01-0.1 Units/min, Intravenous, Continuous, Jed Evans APRN, Last Rate: 9 mL/hr at 11/03/23 0830, 0.03 Units/min at 11/03/23 0830    vecuronium (NORCURON) 100 mg in sodium chloride 0.9 % 100 mL infusion, 0.6-1.7 mcg/kg/min, Intravenous,  Titrated, Arely Sarmiento APRN, Last Rate: 2.7 mL/hr at 11/03/23 0410, 0.6 mcg/kg/min at 11/03/23 0410    vecuronium (NORCURON) bolus from bag 1 mg/mL 7.49 mg, 100 mcg/kg, Intravenous, Once, Arely Sarmiento APRN    Assessment:   Acute kidney injury   Severe metabolic/ respiratory acidosis   Cardiopulmonary arrest   ARDS   Possible anoxic brain injury   Hyperglycemia   Elevated troponin   Hypokalemia   Hypernatremia   Lactic acidosis  Recommendations:   Patient with acute kidney injury sepsis hypotension status post cardiac arrest with CT scan picture consistent with possible anoxic brain injury   Now with metabolic acidosis respiratory acidosis electrolyte disturbances bicarb drip started at 5:00 a.m. repeat pH 5 hours later still acidotic as 6.9   Increased bicarb drip to 150 cc an hour 2 amps of sodium bicarb boluses ordered   Patient is in a very difficult situation being in a prone position unable to put any IV line per intensivist   With a picture of anoxic brain injury he has a very poor prognosis   Will discuss with intensivist it seems like further resuscitation efforts may not be helpful and might be futile   And it sounds like no matter what we will do the patient will pass away   Continue bicarb drip  Recheck labs In few hours   Will follow closely   CCT time 32 minutes         Genesis Cunha MD  11/3/2023  09:00 EDT

## 2023-11-03 NOTE — PROGRESS NOTES
New Flolan syringed installed.  Assisted with changing patients position while being proned.  Airway secured and assessed.

## 2023-11-03 NOTE — ED NOTES
Nursing report ED to floor  Yusuf Jin  52 y.o.  male    HPI:   Chief Complaint   Patient presents with    Cardiac Arrest       Admitting doctor:   Victoriano Phillip DO    Admitting diagnosis:   The primary encounter diagnosis was History of sudden cardiac arrest successfully resuscitated. Diagnoses of Anoxic brain injury and Accidental fentanyl overdose, initial encounter were also pertinent to this visit.    Code status:   Current Code Status       Date Active Code Status Order ID Comments User Context       Not on file            Allergies:   Patient has no allergy information on record.    Isolation:  No active isolations     Fall Risk:  Fall Risk Assessment was completed, and patient is at moderate risk for falls.   Predictive Model Details         11 (Low) Factor Value    Calculated 11/3/2023 00:43 Age 52    Risk of Fall Model Musculoskeletal Assessment WDL     Active Peripheral IV Present     Imaging order in this encounter Present     Respiratory Rate 25     Number of Distinct Medication Classes administered 7     Diastolic BP 48     Magnesium 2.4 mg/dL     Drug Use Not Asked     Skin Assessment WDL     Ugo Scale not on file     Peripheral Vascular Assessment WDL     Tobacco Use Not Asked     ALT 93 U/L     Financial Class Medicaid     Clinically Relevant Sex Not Female     Number of administrations of Ulcer Drugs 1     Albumin 3.9 g/dL     Gastrointestinal Assessment WDL     Calcium 9.1 mg/dL     Creatinine 1.33 mg/dL     Chloride 102 mmol/L     Cardiac Assessment WDL     Days after Admission 0.056     Potassium 3.9 mmol/L     Total Bilirubin 0.2 mg/dL         Weight:       11/02/23  2345   Weight: 72 kg (158 lb 11.7 oz)       Intake and Output  No intake or output data in the 24 hours ending 11/03/23 0043    Diet:        Most recent vitals:   Vitals:    11/03/23 0021 11/03/23 0024 11/03/23 0027 11/03/23 0030   BP:    105/48   Pulse: 108 108 108 108   Resp:       Temp:       TempSrc:        SpO2: 99% 98% 97% 99%   Weight:       Height:           Active LDAs/IV Access:   Lines, Drains & Airways       Active LDAs       Name Placement date Placement time Site Days    Peripheral IV 11/02/23 2306 Anterior;Right Forearm 11/02/23 2306  Forearm  less than 1    Peripheral IV 11/03/23 0014 Anterior;Left;Upper Arm 11/03/23  0014  Arm  less than 1    Peripheral IV 11/03/23 0014 Anterior;Distal;Left Forearm 11/03/23 0014  Forearm  less than 1    ETT  11/02/23 2307  -- less than 1                    Skin Condition:   Skin Assessments (last day)       Date/Time Device Skin Pressure Protection    11/02/23 2328 skin-to-device areas padded             Labs (abnormal labs have a star):   Labs Reviewed   URINALYSIS W/ CULTURE IF INDICATED - Abnormal; Notable for the following components:       Result Value    Blood, UA Trace (*)     All other components within normal limits    Narrative:     In absence of clinical symptoms, the presence of pyuria, bacteria, and/or nitrites on the urinalysis result does not correlate with infection.   COMPREHENSIVE METABOLIC PANEL - Abnormal; Notable for the following components:    Glucose 266 (*)     Creatinine 1.33 (*)     CO2 21.0 (*)     ALT (SGPT) 93 (*)     AST (SGOT) 154 (*)     Anion Gap 19.0 (*)     All other components within normal limits    Narrative:     GFR Normal >60  Chronic Kidney Disease <60  Kidney Failure <15     CBC WITH AUTO DIFFERENTIAL - Abnormal; Notable for the following components:    RBC 4.11 (*)     MCV 99.0 (*)     MCH 33.1 (*)     All other components within normal limits   URINALYSIS, MICROSCOPIC ONLY - Abnormal; Notable for the following components:    Sperm, UA Occasional (*)     All other components within normal limits   POC LACTATE - Abnormal; Notable for the following components:    Lactate 7.7 (*)     All other components within normal limits   COVID-19 AND FLU A/B, NP SWAB IN TRANSPORT MEDIA 8-12 HR TAT - Normal    Narrative:     Fact sheet for  providers: https://www.fda.gov/media/467191/download    Fact sheet for patients: https://www.fda.gov/media/690992/download    Test performed by PCR.   TROPONIN - Normal    Narrative:     High Sensitive Troponin T Reference Range:  <10.0 ng/L- Negative Female for AMI  <15.0 ng/L- Negative Male for AMI  >=10 - Abnormal Female indicating possible myocardial injury.  >=15 - Abnormal Male indicating possible myocardial injury.   Clinicians would have to utilize clinical acumen, EKG, Troponin, and serial changes to determine if it is an Acute Myocardial Infarction or myocardial injury due to an underlying chronic condition.        BNP (IN-HOUSE) - Normal    Narrative:     This assay is used as an aid in the diagnosis of individuals suspected of having heart failure. It can be used as an aid in the diagnosis of acute decompensated heart failure (ADHF) in patients presenting with signs and symptoms of ADHF to the emergency department (ED). In addition, NT-proBNP of <300 pg/mL indicates ADHF is not likely.    Age Range Result Interpretation  NT-proBNP Concentration (pg/mL:      <50             Positive            >450                   Gray                 300-450                    Negative             <300    50-75           Positive            >900                  Gray                300-900                  Negative            <300      >75             Positive            >1800                  Gray                300-1800                  Negative            <300   MAGNESIUM - Normal   ACETAMINOPHEN LEVEL - Normal    Narrative:     Acetaminophen Therapeutic Range  5-20 ug/mL      Hours after ingestion            Toxic Value    4 Hours                           150 ug/mL    8 Hours                            70 ug/mL   12 Hours                            40 ug/mL   16 Hours                            20 ug/mL    These values apply to a single ingestion only.    URINE DRUG SCREEN - Normal    Narrative:     Negative  Thresholds Per Drugs Screened:    Amphetamines                 500 ng/ml  Barbiturates                 200 ng/ml  Benzodiazepines              100 ng/ml  Cocaine                      300 ng/ml  Methadone                    300 ng/ml  Opiates                      300 ng/ml  Oxycodone                    100 ng/ml  THC                           50 ng/ml    The Normal Value for all drugs tested is negative. This report includes final unconfirmed screening results to be used for medical treatment purposes only. Unconfirmed results must not be used for non-medical purposes such as employment or legal testing. Clinical consideration should be applied to any drug of abuse test, particularly when unconfirmed results are used.          All urine drugs of abuse requests without chain of custody are for medical screening purposes only.  False positives are possible.     SALICYLATE LEVEL - Normal   BLOOD CULTURE   BLOOD CULTURE   RAINBOW DRAW    Narrative:     The following orders were created for panel order Simpson Draw.  Procedure                               Abnormality         Status                     ---------                               -----------         ------                     Green Top (Gel)[[id number]]                                  Final result               Lavender Top[[id number]]                                     Final result               Gold Top - SST[[id number]]                                   Final result               Light Blue Top[[id number]]                                   Final result                 Please view results for these tests on the individual orders.   BLOOD GAS, ARTERIAL   RAINBOW DRAW    Narrative:     The following orders were created for panel order Simpson Draw.  Procedure                               Abnormality         Status                     ---------                               -----------         ------                     Green Top (Gel)[[id number]]                                   In process                 Lavender Top[[id number]]                                                                Gold Top - SST[[id number]]                                                              Light Blue Top[[id number]]                                                                Please view results for these tests on the individual orders.   ETHANOL   BLOOD GAS, ARTERIAL   LACTIC ACID, REFLEX   SCAN SLIDE   HIGH SENSITIVITIY TROPONIN T 2HR   MANUAL DIFFERENTIAL   POC LACTATE   GREEN TOP   LAVENDER TOP   GOLD TOP - SST   LIGHT BLUE TOP   CBC AND DIFFERENTIAL    Narrative:     The following orders were created for panel order CBC & Differential.  Procedure                               Abnormality         Status                     ---------                               -----------         ------                     CBC Auto Differential[[id number]]        Abnormal            Preliminary result         Scan Slide[[id number]]                                       In process                   Please view results for these tests on the individual orders.   GREEN TOP   LAVENDER TOP   GOLD TOP - SST   LIGHT BLUE TOP       LOC:  patient intubated and unresponsive    Telemetry:  Critical Care    Cardiac Monitoring Ordered: yes    EKG:   ECG 12 Lead Other; cardiac arrest   Preliminary Result   HEART RATE= 82  bpm   RR Interval= 732  ms   VA Interval=   ms   P Horizontal Axis=   deg   P Front Axis=   deg   QRSD Interval= 106  ms   QT Interval= 369  ms   QTcB= 431  ms   QRS Axis= 72  deg   T Wave Axis= -64  deg   - ABNORMAL ECG -   Atrial fibrillation   Repol abnrm, severe global ischemia (LM/MVD)   No previous ECG available for comparison   Electronically Signed By:    Date and Time of Study: 2023-11-02 23:21:07          Medications Given in the ED:   Medications   sodium chloride 0.9 % flush 10 mL (has no administration in time range)   sodium chloride 0.9 % flush 10 mL (has no administration in time  range)   piperacillin-tazobactam (ZOSYN) IVPB 3.375 g in 100 mL NS (CD) (3.375 g Intravenous New Bag 11/3/23 0023)   DOPamine 400 mg in 250 mL D5W infusion (15 mcg/kg/min × 72 kg Intravenous Rate/Dose Change 11/3/23 0042)   EPINEPHrine (ADRENALIN) injection (1 mg Intravenous Given 11/2/23 2336)   EPINEPHrine 5 mg in 250 mL NS infusion (0.05 mcg/kg/min × 72 kg Intravenous Given 11/2/23 2338)   sodium chloride 0.9 % bolus 2,160 mL (2,160 mL Intravenous New Bag 11/3/23 0024)   EPINEPHrine (ADRENALIN) injection (1 mg Intravenous Given 11/2/23 2314)   iopamidol (ISOVUE-370) 76 % injection 100 mL (100 mL Intravenous Given 11/3/23 0015)   amiodarone (CORDARONE) injection (300 mg Intravenous Given 11/2/23 2315)   amiodarone 360 mg in 200 mL D5W infusion (0 mg/min Intravenous Stopped 11/3/23 0042)   atropine sulfate injection (1 mg Intravenous Given 11/2/23 2330)   DOPamine 400 mg in 250 mL D5W infusion (5 mcg/kg/min × 72 kg Intravenous New Bag 11/2/23 2232)       Imaging results:  CT Angiogram Chest Pulmonary Embolism    Result Date: 11/3/2023  Impression: 1.Multifocal pneumonia. 2.No evidence of pulmonary embolism. Electronically Signed: Grey Newton MD  11/3/2023 12:22 AM EDT  Workstation ID: [id number]    CT Head Without Contrast    Result Date: 11/3/2023  Impression: 1.Gray-white differentiation is present, but poor throughout the brain and cerebellum. This could be related to anoxic brain injury. This could be further evaluated with follow-up brain MRI or serial CT as indicated. 2.No acute intracranial hemorrhage. Electronically Signed: Grey Newton MD  11/3/2023 12:20 AM EDT  Workstation ID: [id number]    XR Chest 1 View    Result Date: 11/2/2023  Impression: 1.ET tube tip 5.3 cm above the andry. 2.Right apical airspace disease. Electronically Signed: Grey Newton MD  11/2/2023 11:37 PM EDT  Workstation ID: [id number]     Social issues:   Social History     Socioeconomic History    Marital status: Unknown       NIH  Stroke Scale:  Interval: (not recorded)  1a. Level of Consciousness: (not recorded)  1b. LOC Questions: (not recorded)  1c. LOC Commands: (not recorded)  2. Best Gaze: (not recorded)  3. Visual: (not recorded)  4. Facial Palsy: (not recorded)  5a. Motor Arm, Left: (not recorded)  5b. Motor Arm, Right: (not recorded)  6a. Motor Leg, Left: (not recorded)  6b. Motor Leg, Right: (not recorded)  7. Limb Ataxia: (not recorded)  8. Sensory: (not recorded)  9. Best Language: (not recorded)  10. Dysarthria: (not recorded)  11. Extinction and Inattention (formerly Neglect): (not recorded)    Total (NIH Stroke Scale): (not recorded)     Additional notable assessment information:unresponsive and intubated     Nursing report ED to floor:  IBAN Joyce RN   11/03/23 00:43 EDT

## 2023-11-03 NOTE — CASE MANAGEMENT/SOCIAL WORK
Case Management/Social Work    Patient Name:  Yusuf Jin  YOB: 1970  MRN: [id number]  Admit Date:  11/2/2023      Social Determinants of Health     Tobacco Use: Not on file   Alcohol Use: Unknown (11/3/2023)    AUDIT-C     Frequency of Alcohol Consumption: Patient refused     Average Number of Drinks: Patient refused     Frequency of Binge Drinking: Patient refused   Financial Resource Strain: Unknown (11/3/2023)    Overall Financial Resource Strain (CARDIA)     Difficulty of Paying Living Expenses: Patient refused   Food Insecurity: No Food Insecurity (11/3/2023)    Hunger Vital Sign     Worried About Running Out of Food in the Last Year: Never true     Ran Out of Food in the Last Year: Never true   Transportation Needs: No Transportation Needs (11/3/2023)    PRAPARE - Transportation     Lack of Transportation (Medical): No     Lack of Transportation (Non-Medical): No   Physical Activity: Unknown (11/3/2023)    Exercise Vital Sign     Days of Exercise per Week: Patient refused     Minutes of Exercise per Session: Patient refused   Stress: Not on file   Social Connections: Unknown (11/3/2023)    Family and Community Support     Help with Day-to-Day Activities: I get all the help I need     Lonely or Isolated: Patient refused   Interpersonal Safety: Unknown (11/3/2023)    Abuse Screen     Unsafe at Home or Work/School: other (see comments)     Feels Threatened by Someone?: other (see comments)     Does Anyone Keep You from Contacting Others or Doint Things Outside the Home?: other (see comments)     Physical Sign of Abuse Present: no   Depression: Not at risk (11/3/2023)    PHQ-2     PHQ-2 Score: 0   Housing Stability: Not At Risk (11/3/2023)    Housing Stability     Current Living Arrangements: apartment     Potentially Unsafe Housing Conditions: none   Utilities: Not At Risk (11/3/2023)    [facility] Utilities     Threatened with loss of utilities: No   Health Literacy: Unknown (11/3/2023)     Education     Help with school or training?: Patient refused     Preferred Language: English   Employment: Unknown (11/3/2023)    Employment     Do you want help finding or keeping work or a job?: Patient refused   Disabilities: Not At Risk (11/3/2023)    Disabilities     Concentrating, Remembering, or Making Decisions Difficulty: no     Doing Errands Independently Difficulty: no             Electronically signed by:  Pauline Rm RN  11/03/23 11:57 EDT    Office Phone: [phone]  Office Cell:     [phone]

## 2023-11-03 NOTE — PROGRESS NOTES
Expiratory filter changed at this time.  Patient placed in prone position.  ETT ties placed and padded while in prone position.

## 2023-11-03 NOTE — CONSULTS
PICC Line Insertion Procedure Note    Procedure: Insertion of #5 FR/16G PICC    Indications:  Pt./Dr. Preference    Active Time Out:  Correct patient: Yes  Correct procedure: Yes  Correct site: Yes  Verified with: IBAN Joyce    Procedure Details:  Informed consent was obtained for the procedure.  Risk include, but are not limited to infection, air embolism, catheter tip moving, catheter blockage and phlebitis.     Maximum sterile technique was used including antiseptics, cap, gloves, gown, hand hygiene, mask, and sheet.    Ultrasound Guidance: Yes    #5 FR/16G PICC inserted to the R Upper Arm vein per hospital protocol by Moreno Bess RN.   Non-pulsatile blood return: yes    Lot #: [id number]   Expiration date: 2024-09-30    Complications:  none    Findings:  Catheter inserted to 38 cm, with 0 cm exposed.   Mid upper arm circumference is 36 cm.   Catheter was flushed with 30 cc NS and sterile dressing applied.  Patient tolerated procedure well.  PICC tip verified by:       [x] Sapiens 3cg       [] Chest X-ray    Recommendations:  Verbal and/or written Care/Maintenance instructions provided to patient.   Primary nurse notified.    Victoriano Bess RN  11/03/23  02:43 EDT

## 2023-11-03 NOTE — CASE MANAGEMENT/SOCIAL WORK
Discharge Planning Assessment   Tao     Patient Name: Yusuf Jin  MRN: [id number]  Today's Date: 11/3/2023    Admit Date: 11/2/2023    Plan: DC Plan: Pending clinica course and goals of care decisions. Lives alone in apartment. Distant relatives assist with needs. Reported to be estranged from immediate family members. CSW following. Palliative Following.   Discharge Needs Assessment       Row Name 11/03/23 1312       Living Environment    People in Home alone    Current Living Arrangements apartment    Potentially Unsafe Housing Conditions none    Primary Care Provided by self    Provides Primary Care For no one    Family Caregiver if Needed other (see comments)  Cousin Odessa says she and her dad both watch over patient and assist him with his needs    Quality of Family Relationships non-existent;other (see comments)  Odessa reports he is estranged from family with exception of his sister.    Able to Return to Prior Arrangements yes       Resource/Environmental Concerns    Resource/Environmental Concerns none    Transportation Concerns none       Transition Planning    Patient/Family Anticipates Transition to other (see comments)  unsure at this time    Patient/Family Anticipated Services at Transition other (see comments)  unsure at this time    Transportation Anticipated family or friend will provide       Discharge Needs Assessment    Readmission Within the Last 30 Days no previous admission in last 30 days    Equipment Currently Used at Home none    Concerns to be Addressed substance/tobacco abuse/use;discharge planning    Anticipated Changes Related to Illness none    Equipment Needed After Discharge other (see comments)  CM will continue to monitor for needs    Provided Post Acute Provider List? N/A    Provided Post Acute Provider Quality & Resource List? N/A    Current Discharge Risk substance use/abuse;lives alone                   Discharge Plan       Row Name 11/03/23 2238       Plan    Plan  [place] Plan: Pending clinica course and goals of care decisions. Lives alone in apartment. Distant relatives assist with needs. Reported to be estranged from immediate family members. CSW following. Palliative Following.    Provided Post Acute Provider List? N/A    Provided Post Acute Provider Quality & Resource List? N/A    Plan Comments CM spoke with patient's allege cousin Odessa Beckford at bedside to Obtain family contact information. Odessa states she and her father Zurdo Jackson look after patient and he works for them. She states she owns the apartment building he lives in and was notified by a neighbor Sherie this morning of patient leaving in an ambulance last night so she came to the hospital. Odessa is actively working to obtain family contact information. Odessa states patient has been estranged from his family off and on. He does have a relationship with his sister Bree Riggins. Odessa was able to povide basic information to complete admission assessment. Unsure of patient needs of services at discharge at this time. If patient is able to recover then Odessa or Zurdo can provide transportation at [place]. PCP and pharmacy unable to be confirmed. Odessa denies any difficulty affording medications at this time. Odessa denies any additional needs for services or DME at this time. Odessa was later able to contact a relative who had information for patient's sister Bree Riggins [phone]. Patient's mother's name is Huong Jin [phone]. CM gave information to Dr. Phillip and he contacted family. CM reached out to patient's mother to attempt to get information for patient's adult son. Huong states his son's name is Yusuf Interiano [phone]. CM attempted to make contact with Yusuf Interiano but no answer. CM left  with return contact information and informed nursing. SAMREEN updated CSW Archie LEE of information collected. Archie VELOZ informed her of another daughter and ex-wife. Daughter is  Tina Jin [phone]. See CSW note for ex wife information. CM will attempt to confirm report of daughter before making contact.CM spoke with intensivist, nursing, and NP to obtain clinical upates in morning rounds. Plans for goals of care discussion when decision maker determined. Patient in prone position with a GCS of 3 without any sedation.CM will continue to follow for any further needs and adjust discharge plan accordingly. DC Barriers: NOK determination, Cardiopulmonary monitoring, Vent 100/10, Picc, arterial line, NGT, Norcuron gtt, IV abx, critical labs, pending u/s of liver, coagulopathy monitoring, and critical labs.             Demographic Summary       Row Name 11/03/23 1310       General Information    Admission Type inpatient    Arrived From emergency department;home    Referral Source admission list    Reason for Consult discharge planning    Preferred Language English       Contact Information    Permission Granted to Share Info With                    Functional Status       Row Name 11/03/23 1311       Functional Status    Usual Activity Tolerance excellent    Current Activity Tolerance other (see comments)  GEORGE    Functional Status Comments All admission information obtained from Odessa Beckford (alleged cousin) at bedside. Patient intubated and unresponsive.       Functional Status, IADL    Medications independent    Meal Preparation independent    Housekeeping independent    Laundry independent    Shopping independent       Mental Status    General Appearance WDL WDL       Mental Status Summary    Recent Changes in Mental Status/Cognitive Functioning no changes       Employment/    Employment Status self-employed    Current or Previous Occupation not applicable           Current or Previous  Service none               Pauline Rm, RN    Office Phone: [phone]  Office Cell:     [phone]

## 2023-11-03 NOTE — ED NOTES
Patient belongings sent up to ICU include- claudia sampson with patients ID and SS card. Brown boots. Socks. Pants and underwear. Shirt. Belt. Keys.

## 2023-11-03 NOTE — H&P
Critical Care History and Physical     Yusuf Jin : 1970 MRN:[id number] LOS:0 ROOM: [facility]/     Reason for admission: Cardiac arrest with successful resuscitation     Assessment / Plan     Cardiopulmonary arrest with successful resuscitation  -No TTM due to anoxic injury on initial CT scan  -Monitor and trend labs as appropriate  -Mechanical ventilation support and management  -Vasopressor support  -Pan culture    Elevated random blood sugar  -A1C  -Start SSI  -Accu-checks Q 6 hours        Code Status (Patient has no pulse and is not breathing): CPR (Attempt to Resuscitate)  Medical Interventions (Patient has pulse or is breathing): Full Support       Nutrition:   NPO Diet NPO Type: Strict NPO     DVT prophylaxis:  Medical and mechanical DVT prophylaxis orders are present.     History of Present illness     Yusuf Jin is a 52 y.o. male with no reported PMH presented to the hospital for cardiac arrest, and was admitted with a principal diagnosis of Cardiac arrest with successful resuscitation.  HPI information is limited due to patient intubated and unresponsive; information obtained from ER report and chart review.  Per reported history at this time patient's sister stated he had been drinking excessively and went to smoke some marijuana.  She states the marijuana looked and smelled different than any she never seen before and after he fell to the floor she called [phone].  After her brother was taken to the hospital she performed a fentanyl test kit on the marijuana and it tested 100% for fentanyl.  Patient was brought to the intensive care unit for further evaluation and treatment.      ACP: Patient will remain full code with full intervention; attempting to determine next of kin.       Patient was seen and examined on 23 at 01:49 EDT .    Subjective / Review of systems     Review of Systems   Unable to perform ROS: Acuity of condition        Past Medical/Surgical/Social/Family History &  Allergies     No past medical history on file.   No past surgical history on file.   Social History     Socioeconomic History    Marital status: Unknown      No family history on file.   Not on File   Social Determinants of Health     Tobacco Use: Not on file   Alcohol Use: Not on file   Financial Resource Strain: Not on file   Food Insecurity: Not on file   Transportation Needs: Not on file   Physical Activity: Not on file   Stress: Not on file   Social Connections: Unknown (11/3/2023)    Family and Community Support     Help with Day-to-Day Activities: Not on file     Lonely or Isolated: Not on file   Interpersonal Safety: Unknown (11/3/2023)    Abuse Screen     Unsafe at Home or Work/School: unable to answer (comment required)     Feels Threatened by Someone?: unable to answer (comment required)     Does Anyone Keep You from Contacting Others or Doint Things Outside the Home?: unable to answer (comment required)     Physical Sign of Abuse Present: no   Depression: Not on file   Housing Stability: Unknown (11/3/2023)    Housing Stability     Current Living Arrangements: Not on file     Potentially Unsafe Housing Conditions: Not on file   Utilities: Not on file   Health Literacy: Unknown (11/3/2023)    Education     Help with school or training?: Not on file     Preferred Language: Not on file   Employment: Unknown (11/3/2023)    Employment     Do you want help finding or keeping work or a job?: Not on file   Disabilities: Unknown (11/3/2023)    Disabilities     Concentrating, Remembering, or Making Decisions Difficulty: Not on file     Doing Errands Independently Difficulty: Not on file        Home Medications     Prior to Admission medications    Not on File        Objective / Physical Exam     Vital signs:  Temp: 93.9 °F (34.4 °C)  BP: 118/55  Heart Rate: 115  Resp: 24  SpO2: 97 %  Weight: 74.9 kg (165 lb 2 oz)    Admission Weight: Weight: 72 kg (158 lb 11.7 oz)    Physical Exam  Vitals and nursing note  reviewed.   Constitutional:       Appearance: Normal appearance.   HENT:      Head: Normocephalic.      Nose: Nose normal.      Mouth/Throat:      Mouth: Mucous membranes are moist.      Pharynx: Oropharynx is clear.   Eyes:      Comments: Fixed and dilated   Cardiovascular:      Rate and Rhythm: Normal rate and regular rhythm.      Pulses: Normal pulses.      Heart sounds: Normal heart sounds.   Pulmonary:      Comments: ET tube in place  Abdominal:      General: Bowel sounds are normal.      Palpations: Abdomen is soft.   Musculoskeletal:         General: Normal range of motion.      Cervical back: Normal range of motion.   Skin:     General: Skin is warm and dry.      Capillary Refill: Capillary refill takes 2 to 3 seconds.   Neurological:      Comments: Status postcardiac arrest, nonresponsive, intubated   Psychiatric:      Comments: Status postcardiac arrest, nonresponsive, intubated          Labs     Results from last 7 days   Lab Units 11/02/23  2323   WBC 10*3/mm3 7.50   HEMATOCRIT % 40.7   PLATELETS 10*3/mm3 229      Results from last 7 days   Lab Units 11/02/23  2323   SODIUM mmol/L 142   POTASSIUM mmol/L 3.9   CHLORIDE mmol/L 102   CO2 mmol/L 21.0*   BUN mg/dL 11   CREATININE mg/dL 1.33*        Imaging     CT Angiogram Chest Pulmonary Embolism    Result Date: 11/3/2023  Impression: 1.Multifocal pneumonia. 2.No evidence of pulmonary embolism. Electronically Signed: Grey Newton MD  11/3/2023 12:22 AM EDT  Workstation ID: [id number]    CT Head Without Contrast    Result Date: 11/3/2023  Impression: 1.Gray-white differentiation is present, but poor throughout the brain and cerebellum. This could be related to anoxic brain injury. This could be further evaluated with follow-up brain MRI or serial CT as indicated. 2.No acute intracranial hemorrhage. Electronically Signed: Grey Newton MD  11/3/2023 12:20 AM EDT  Workstation ID: [id number]    XR Chest 1 View    Result Date: 11/2/2023  Impression: 1.ET tube tip  5.3 cm above the andry. 2.Right apical airspace disease. Electronically Signed: Grey Newton MD  11/2/2023 11:37 PM EDT  Workstation ID: [id number]       Chest X ray: My independent assessment showed no infiltrates or effusions    EKG: My independent evaluation showed sinus rhythm, no ST -T changes    Current Medications     Scheduled Meds:  chlorhexidine, 15 mL, Mouth/Throat, Q12H  heparin (porcine), 5,000 Units, Subcutaneous, Q8H  insulin lispro, 2-7 Units, Subcutaneous, Q6H  pantoprazole, 40 mg, Intravenous, Q24H  piperacillin-tazobactam, 3.375 g, Intravenous, Q8H  senna-docusate sodium, 2 tablet, Oral, BID  sodium chloride, 10 mL, Intravenous, Q12H         Continuous Infusions:  EPINEPHrine, 0.02-0.5 mcg/kg/min  phenylephrine, 0.5-6 mcg/kg/min       Patient continues to be critically ill, remains at risk of clinical deterioration or death and needed high complexity decision making. I have spent a total of 50 minutes providing critical care services to this patient including but not limited to: review of labs/ microbiology/imaging/medications, serial monitoring of vital signs, adjusting ventilator settings as needed, review of other consultant's notes, review of events in the last 24 hrs, monitoring input/output, review of treatment plan with bedside nurse, RT and other treatment team, management of life support and nutrition needs. I also spoke with family about the plan of care and answered all questions.    Time spent in performing separately billable procedures and updating family is not included in the critical care time.       EDOUARD Sr   Critical Care  11/03/23   01:49 EDT

## 2023-11-03 NOTE — CASE MANAGEMENT/SOCIAL WORK
Social Work Assessment   Tao     Patient Name: Yusuf Jin  MRN: [id number]  Today's Date: 11/3/2023    Admit Date: 11/2/2023     Discharge Plan       Row Name 11/03/23 1256       Plan    Plan Comments TABITHA contacted [place] [employer] to locate possible NOK. The number listed in Pt chart is a neighbor of the Pt (Sherie). [employer] stated Possible NOK: a daughter, Tina QUEZADA [phone] and an ex-wife [phone] Indra LU following.        WILL Terry, MSW    Phone: [phone]  Fax: [phone]  Email: [email or web address]

## 2023-11-03 NOTE — PROGRESS NOTES
Patient was supine for a short period of timeto have lines placed.  Patient now placed back prone.  Airway secure.

## 2023-11-03 NOTE — ED PROVIDER NOTES
Subjective   History of Present Illness  53 yo male transferred from [facility] s/p full arrest.  Patient reportedly was smoking THC and collapsed. Per EMS, this was related by a young female on the scene.  No family is available.  Patient has never been to this facility before.  BLS crew responded to scene.  No shock was advised, patient was in asystole at presentation.  Patient was intubated by me after arrival at 2305, ACLS followed, ROSC at 2311.  Patient then lost pulse with Vtach at 2313.  ROSC again 2315.  Patient then became bradycardic and lost pulse again 2335.  After one cycle of CPR, ROSC again.      Review of Systems   Unable to perform ROS: Patient unresponsive       No past medical history on file.    Not on File    No past surgical history on file.    No family history on file.    Social History     Socioeconomic History    Marital status: Unknown           Objective   Physical Exam  Constitutional:       Comments: Unresponsive 53 yo male, GCS 3T   HENT:      Head: Normocephalic and atraumatic.   Eyes:      Comments: Pupils dilated, nonreactive bilaterally   Cardiovascular:      Comments: irregular  Pulmonary:      Comments: No spontaneous respiratory effort, clear via vent bilaterally  Abdominal:      Comments: nondistended   Musculoskeletal:      Comments: No extremity edema   Skin:     General: Skin is warm and dry.         Intubation    Date/Time: 11/2/2023 11:56 PM    Performed by: Andrew Mccauley MD  Authorized by: Andrew Mccauley MD    Consent:     Consent obtained:  Emergent situation  Pre-procedure details:     Induction agents:  None    Paralytics:  None  Procedure details:     Preoxygenation:  Bag valve mask    CPR in progress: yes      Number of attempts:  1  Successful intubation attempt details:     Intubation method:  Oral    Intubation technique: video assisted      Laryngoscope blade:  Mac 4    Tube size (mm):  7.5    Tube type:  Cuffed    Tube visualized through cords: yes     Placement assessment:     ETT at teeth/gumline (cm):  24    Tube secured with:  ETT madera    Breath sounds:  Absent over the epigastrium and equal    Placement verification: chest rise, colorimetric ETCO2, CXR verification, direct visualization and tube exhalation      CXR findings:  Appropriate position  Post-procedure details:     Procedure completion:  Tolerated             ED Course                                           Medical Decision Making  Resuscitated arrest stable for the time being stable on pressors.  Etiology unclear.  Differential would include arrhythmia versus MI versus drug overdose.  Initial toxicology is negative.  EKG without STEMI.    Case discussed with sister, Kristine, who stated she was with her brother tonight who had been drinking alcohol and smoking THC.  Kristine tested the THC after her brother was transferred to the hospital and it was positive for fentanyl.    Critical Care Time 45 minutes.    Problems Addressed:  Anoxic brain injury: acute illness or injury  History of sudden cardiac arrest successfully resuscitated: acute illness or injury    Amount and/or Complexity of Data Reviewed  Labs: ordered.  Radiology: ordered.  ECG/medicine tests: ordered and independent interpretation performed.     Details: EKG:  irregular, rate 82, no stemi    Risk  Prescription drug management.        Final diagnoses:   History of sudden cardiac arrest successfully resuscitated   Anoxic brain injury   Accidental fentanyl overdose, initial encounter       ED Disposition  ED Disposition       ED Disposition   Decision to Admit    Condition   --    Comment   Level of Care: Critical Care [6]   Admitting Physician: LAURA MORA [[id number]]                 No follow-up provider specified.       Medication List      No changes were made to your prescriptions during this visit.            Andrew Mccauley MD  11/03/23 0041

## 2023-11-04 NOTE — PROGRESS NOTES
Nephrology  Progress Note                                        [facility]    Patient Identification    Name: Yusuf Jin  Age: 52 y.o.  Sex: male  :  1970  MRN: [id number]      DATE OF SERVICE:  2023        Subective    CRRT  Vent  Supine  Pressors now only 2   Fio2 60%     Objective   Scheduled Meds:cefepime, 2,000 mg, Intravenous, Q24H  chlorhexidine, 15 mL, Mouth/Throat, Q12H  fomepizole (ANTIZOL) 750 mg in sodium chloride 0.9 % 100 mL IVPB, 10 mg/kg, Intravenous, Q8H  insulin lispro, 2-7 Units, Subcutaneous, Q6H  levETIRAcetam, 750 mg, Intravenous, Q12H  pantoprazole, 40 mg, Intravenous, Q12H  sodium chloride, 10 mL, Intravenous, Q12H  sodium chloride, 10 mL, Intravenous, Q12H  sodium chloride, 10 mL, Intravenous, Q12H  vancomycin, 1,250 mg, Intravenous, Once          Continuous Infusions:dextrose, 50 mL/hr, Last Rate: Stopped (23)  EPINEPHrine, 0.02-0.5 mcg/kg/min, Last Rate: Stopped (23)  epoprostenol, 50 ng/kg/min (Ideal), Last Rate: 50 ng/kg/min (23 050)  norepinephrine, 0.02-0.5 mcg/kg/min, Last Rate: 0.4 mcg/kg/min (23)  Pharmacy to Dose Cefepime,   Pharmacy to dose vancomycin,   phenylephrine, 0.5-6 mcg/kg/min, Last Rate: Stopped (23)  Phoxillum BK4/2.5, 2,000 mL/hr  Phoxillum BK4/2.5, 2,000 mL/hr  Phoxillum BK4/2.5, 2,000 mL/hr, Last Rate: 2,000 mL/hr (23 0600)  sodium bicarbonate, 150 mEq, Last Rate: 150 mEq (23 1331)  vasopressin, 0.01-0.1 Units/min, Last Rate: 0.03 Units/min (23 0830)        PRN Meds:  acetaminophen **OR** acetaminophen    artificial tears    senna-docusate sodium **AND** polyethylene glycol **AND** bisacodyl **AND** bisacodyl    Calcium Replacement - Follow Nurse / BPA Driven Protocol    dextrose    dextrose    fentaNYL citrate (PF)    glucagon  "(human recombinant)    heparin (porcine)    ipratropium-albuterol    Magnesium Standard Dose Replacement - Follow Nurse / BPA Driven Protocol    nitroglycerin    ondansetron **OR** ondansetron    Pharmacy to Dose Cefepime    Pharmacy to dose vancomycin    Phosphorus Replacement - Follow Nurse / BPA Driven Protocol    Potassium Replacement - Follow Nurse / BPA Driven Protocol    sodium chloride    sodium chloride    sodium chloride    sodium chloride    sodium chloride    sodium chloride    sodium chloride    Vancomycin Pharmacy Intermittent/Pulse Dosing     Exam:  /85   Pulse (!) 121   Temp 97.3 °F (36.3 °C) (Temporal)   Resp 28   Ht 175.3 cm (69\")   Wt 74.9 kg (165 lb 2 oz)   SpO2 100%   BMI 24.38 kg/m²     Intake/Output last 3 shifts:  I/O last 3 completed shifts:  In: 6466.7 [I.V.:6233.7; Blood:233]  Out: 3260 [Urine:300; Emesis/NG output:1100; Stool:1000]    Intake/Output this shift:  No intake/output data recorded.    Physical exam:  General Appearance:  vent presors CRRT  Head:  Normocephalic, without obvious abnormality, atraumatic  Eyes: conjunctiva/corneas clear     Neck:  Supple,  no adenopathy;      Lungs:  Decreased BS occasion ronchi  Heart:  Regular rate and rhythm, S1 and S2 normal  Abdomen:  Soft, non-tender, bowel sounds active   Extremities: trace edema  Pulses: 2+ and symmetric all extremities  Skin:  No rashes or lesions       Data Review:  All labs (24hrs):   Recent Results (from the past 24 hour(s))   POC Glucose Once    Collection Time: 11/03/23  8:20 AM    Specimen: Blood   Result Value Ref Range    Glucose 73 70 - 105 mg/dL   POC Glucose Once    Collection Time: 11/03/23  8:40 AM    Specimen: Blood   Result Value Ref Range    Glucose 209 (H) 70 - 105 mg/dL   POC Glucose Once    Collection Time: 11/03/23 10:01 AM    Specimen: Blood   Result Value Ref Range    Glucose 107 (H) 70 - 105 mg/dL   Blood Gas, Arterial -    Collection Time: 11/03/23 10:23 AM    Specimen: Arterial Blood "   Result Value Ref Range    Site Arterial Line     Glenroy's Test N/A     pH, Arterial 6.939 (C) 7.350 - 7.450 pH units    pCO2, Arterial 68.7 (H) 35.0 - 48.0 mm Hg    pO2, Arterial 229.4 (H) 83.0 - 108.0 mm Hg    HCO3, Arterial 14.7 (L) 21.0 - 28.0 mmol/L    Base Excess, Arterial -18.8 (L) 0.0 - 3.0 mmol/L    O2 Saturation, Arterial 99.2 (H) 94.0 - 98.0 %    CO2 Content 16.8 (L) 22 - 29 mmol/L    Barometric Pressure for Blood Gas      Modality Adult Vent     FIO2 100 %    Ventilator Mode PC     PEEP 10     Hemodilution No     Respiratory Rate 30    Lactic Acid, Plasma    Collection Time: 11/03/23 10:26 AM    Specimen: Blood   Result Value Ref Range    Lactate 7.7 (C) 0.5 - 2.0 mmol/L   Basic Metabolic Panel    Collection Time: 11/03/23 10:26 AM    Specimen: Blood   Result Value Ref Range    Glucose 128 (H) 65 - 99 mg/dL    BUN 17 6 - 20 mg/dL    Creatinine 2.71 (H) 0.76 - 1.27 mg/dL    Sodium 146 (H) 136 - 145 mmol/L    Potassium 3.1 (L) 3.5 - 5.2 mmol/L    Chloride 114 (H) 98 - 107 mmol/L    CO2 16.0 (L) 22.0 - 29.0 mmol/L    Calcium 8.2 (L) 8.6 - 10.5 mg/dL    BUN/Creatinine Ratio 6.3 (L) 7.0 - 25.0    Anion Gap 16.0 (H) 5.0 - 15.0 mmol/L    eGFR 27.2 (L) >60.0 mL/min/1.73   CBC (No Diff)    Collection Time: 11/03/23 10:26 AM    Specimen: Blood   Result Value Ref Range    WBC 2.00 (L) 3.40 - 10.80 10*3/mm3    RBC 4.89 4.14 - 5.80 10*6/mm3    Hemoglobin 16.0 13.0 - 17.7 g/dL    Hematocrit 47.8 37.5 - 51.0 %    MCV 97.7 (H) 79.0 - 97.0 fL    MCH 32.7 26.6 - 33.0 pg    MCHC 33.5 31.5 - 35.7 g/dL    RDW 13.0 12.3 - 15.4 %    RDW-SD 47.3 37.0 - 54.0 fl    MPV 7.0 6.0 - 12.0 fL    Platelets 231 140 - 450 10*3/mm3   Peripheral Blood Smear    Collection Time: 11/03/23 10:26 AM    Specimen: Blood   Result Value Ref Range    Pathology Review Yes    ABO / Rh    Collection Time: 11/03/23 10:26 AM    Specimen: Blood   Result Value Ref Range    ABO Type O     RH type Positive    POC Glucose Once    Collection Time: 11/03/23  11:11 AM    Specimen: Blood   Result Value Ref Range    Glucose 132 (H) 70 - 105 mg/dL   Fibrinogen    Collection Time: 11/03/23 12:57 PM    Specimen: Blood   Result Value Ref Range    Fibrinogen 154 (L) 210 - 450 mg/dL   D-dimer, Quantitative    Collection Time: 11/03/23 12:57 PM    Specimen: Blood   Result Value Ref Range    D-Dimer, Quantitative 35.10 (H) 0.00 - 0.53 mg/L (FEU)   Protime-INR    Collection Time: 11/03/23 12:57 PM    Specimen: Blood   Result Value Ref Range    Protime 13.0 (H) 9.6 - 11.7 Seconds    INR 1.21 (H) 0.93 - 1.10   aPTT    Collection Time: 11/03/23 12:57 PM    Specimen: Blood   Result Value Ref Range    PTT 25.6 24.0 - 31.0 seconds   Blood Gas, Arterial -    Collection Time: 11/03/23  1:08 PM    Specimen: Arterial Blood   Result Value Ref Range    Site Arterial Line     Glenroy's Test N/A     pH, Arterial 7.130 (C) 7.350 - 7.450 pH units    pCO2, Arterial 57.8 (H) 35.0 - 48.0 mm Hg    pO2, Arterial 181.0 (H) 83.0 - 108.0 mm Hg    HCO3, Arterial 19.3 (L) 21.0 - 28.0 mmol/L    Base Excess, Arterial -10.7 (L) 0.0 - 3.0 mmol/L    O2 Saturation, Arterial 99.1 (H) 94.0 - 98.0 %    CO2 Content 21.0 (L) 22 - 29 mmol/L    Barometric Pressure for Blood Gas      Modality Adult Vent     FIO2 100 %    Ventilator Mode PC     PEEP 10     Hemodilution No     Respiratory Rate 30    POC Glucose Once    Collection Time: 11/03/23  1:08 PM    Specimen: Arterial Blood   Result Value Ref Range    Glucose 172 (H) 74 - 100 mg/dL   Blood Gas, Arterial -    Collection Time: 11/03/23  5:18 PM    Specimen: Arterial Blood   Result Value Ref Range    Site Arterial Line     Glenroy's Test N/A     pH, Arterial 7.279 (L) 7.350 - 7.450 pH units    pCO2, Arterial 42.7 35.0 - 48.0 mm Hg    pO2, Arterial 376.7 (H) 83.0 - 108.0 mm Hg    HCO3, Arterial 20.0 (L) 21.0 - 28.0 mmol/L    Base Excess, Arterial -6.6 (L) 0.0 - 3.0 mmol/L    O2 Saturation, Arterial 99.9 (H) 94.0 - 98.0 %    CO2 Content 21.3 (L) 22 - 29 mmol/L    Barometric  Pressure for Blood Gas      Modality Adult Vent     FIO2 100 %    Ventilator Mode PC     PEEP 10     Hemodilution No     Respiratory Rate 30    POC Glucose Once    Collection Time: 11/03/23  5:30 PM    Specimen: Blood   Result Value Ref Range    Glucose 146 (H) 70 - 105 mg/dL   Hemoglobin & Hematocrit, Blood    Collection Time: 11/03/23  5:56 PM    Specimen: Blood   Result Value Ref Range    Hemoglobin 15.5 13.0 - 17.7 g/dL    Hematocrit 45.3 37.5 - 51.0 %   Calcium, Ionized    Collection Time: 11/03/23  5:56 PM    Specimen: Blood   Result Value Ref Range    Ionized Calcium 1.11 (L) 1.20 - 1.30 mmol/L   MRSA Screen, PCR (Inpatient) - Swab, Nares    Collection Time: 11/03/23  6:28 PM    Specimen: Nares; Swab   Result Value Ref Range    MRSA PCR No MRSA Detected No MRSA Detected   Respiratory Panel PCR w/COVID-19(SARS-CoV-2) [facility] In-House, NP Swab in UTM/VTM, 3-4 HR TAT - Swab, Nasopharynx    Collection Time: 11/03/23  6:28 PM    Specimen: Nasopharynx; Swab   Result Value Ref Range    ADENOVIRUS, PCR Not Detected Not Detected    Coronavirus 229E Not Detected Not Detected    Coronavirus HKU1 Not Detected Not Detected    Coronavirus NL63 Not Detected Not Detected    Coronavirus OC43 Not Detected Not Detected    COVID19 Not Detected Not Detected - Ref. Range    Human Metapneumovirus Not Detected Not Detected    Human Rhinovirus/Enterovirus Not Detected Not Detected    Influenza A PCR Not Detected Not Detected    Influenza B PCR Not Detected Not Detected    Parainfluenza Virus 1 Not Detected Not Detected    Parainfluenza Virus 2 Not Detected Not Detected    Parainfluenza Virus 3 Not Detected Not Detected    Parainfluenza Virus 4 Not Detected Not Detected    RSV, PCR Not Detected Not Detected    Bordetella pertussis pcr Not Detected Not Detected    Bordetella parapertussis PCR Not Detected Not Detected    Chlamydophila pneumoniae PCR Not Detected Not Detected    Mycoplasma pneumo by PCR Not Detected  Not Detected   Prepare Cryoprecipitate 5 Pack, 2 Units    Collection Time: 11/03/23  7:40 PM   Result Value Ref Range    Product Code D3825L61     Unit Number [id number]     UNIT  ABO A     UNIT  RH POS     Dispense Status IS     Blood Expiration Date 147440075815     Blood Type Barcode      Product Code N8690C76     Unit Number [id number]     UNIT  ABO A     UNIT  RH POS     Dispense Status IS     Blood Expiration Date 627571871227     Blood Type Barcode 6200    POC Glucose Once    Collection Time: 11/03/23  8:28 PM    Specimen: Blood   Result Value Ref Range    Glucose 214 (H) 70 - 105 mg/dL   Lavender Top    Collection Time: 11/03/23  8:32 PM   Result Value Ref Range    Extra Tube hold for add-on    Gold Top - SST    Collection Time: 11/03/23  8:32 PM   Result Value Ref Range    Extra Tube Hold for add-ons.    Light Blue Top    Collection Time: 11/03/23  8:32 PM   Result Value Ref Range    Extra Tube Hold for add-ons.    Hemoglobin & Hematocrit, Blood    Collection Time: 11/03/23  8:32 PM    Specimen: Blood   Result Value Ref Range    Hemoglobin 14.0 13.0 - 17.7 g/dL    Hematocrit 41.9 37.5 - 51.0 %   Renal Function Panel    Collection Time: 11/03/23  8:32 PM    Specimen: Blood   Result Value Ref Range    Glucose 260 (H) 65 - 99 mg/dL    BUN 26 (H) 6 - 20 mg/dL    Creatinine 3.61 (H) 0.76 - 1.27 mg/dL    Sodium 148 (H) 136 - 145 mmol/L    Potassium 2.8 (L) 3.5 - 5.2 mmol/L    Chloride 112 (H) 98 - 107 mmol/L    CO2 20.0 (L) 22.0 - 29.0 mmol/L    Calcium 7.3 (L) 8.6 - 10.5 mg/dL    Albumin 2.6 (L) 3.5 - 5.2 g/dL    Phosphorus 1.6 (C) 2.5 - 4.5 mg/dL    Anion Gap 16.0 (H) 5.0 - 15.0 mmol/L    BUN/Creatinine Ratio 7.2 7.0 - 25.0    eGFR 19.3 (L) >60.0 mL/min/1.73   Magnesium    Collection Time: 11/03/23  8:32 PM    Specimen: Blood   Result Value Ref Range    Magnesium 1.3 (L) 1.6 - 2.6 mg/dL   Fibrinogen    Collection Time: 11/04/23  2:00 AM    Specimen: Blood   Result Value Ref Range    Fibrinogen 361 210  - 450 mg/dL   POC Glucose Once    Collection Time: 11/04/23  2:15 AM    Specimen: Blood   Result Value Ref Range    Glucose 90 70 - 105 mg/dL   Magnesium    Collection Time: 11/04/23  3:35 AM    Specimen: Blood   Result Value Ref Range    Magnesium 1.4 (L) 1.6 - 2.6 mg/dL   Calcium, Ionized    Collection Time: 11/04/23  3:35 AM    Specimen: Blood   Result Value Ref Range    Ionized Calcium 1.03 (L) 1.20 - 1.30 mmol/L   Comprehensive Metabolic Panel    Collection Time: 11/04/23  3:35 AM    Specimen: Blood   Result Value Ref Range    Glucose 101 (H) 65 - 99 mg/dL    BUN 20 6 - 20 mg/dL    Creatinine 2.70 (H) 0.76 - 1.27 mg/dL    Sodium 144 136 - 145 mmol/L    Potassium 4.4 3.5 - 5.2 mmol/L    Chloride 109 (H) 98 - 107 mmol/L    CO2 21.0 (L) 22.0 - 29.0 mmol/L    Calcium 7.7 (L) 8.6 - 10.5 mg/dL    Total Protein 5.1 (L) 6.0 - 8.5 g/dL    Albumin 2.8 (L) 3.5 - 5.2 g/dL    ALT (SGPT) 511 (H) 1 - 41 U/L    AST (SGOT) 1,287 (H) 1 - 40 U/L    Alkaline Phosphatase 43 39 - 117 U/L    Total Bilirubin 0.7 0.0 - 1.2 mg/dL    Globulin 2.3 gm/dL    A/G Ratio 1.2 g/dL    BUN/Creatinine Ratio 7.4 7.0 - 25.0    Anion Gap 14.0 5.0 - 15.0 mmol/L    eGFR 27.3 (L) >60.0 mL/min/1.73   Vancomycin, Random    Collection Time: 11/04/23  3:35 AM    Specimen: Blood   Result Value Ref Range    Vancomycin Random 16.70 5.00 - 40.00 mcg/mL   CBC Auto Differential    Collection Time: 11/04/23  3:35 AM    Specimen: Blood   Result Value Ref Range    WBC 6.40 3.40 - 10.80 10*3/mm3    RBC 4.47 4.14 - 5.80 10*6/mm3    Hemoglobin 14.6 13.0 - 17.7 g/dL    Hematocrit 42.7 37.5 - 51.0 %    MCV 95.4 79.0 - 97.0 fL    MCH 32.6 26.6 - 33.0 pg    MCHC 34.1 31.5 - 35.7 g/dL    RDW 13.0 12.3 - 15.4 %    RDW-SD 42.9 37.0 - 54.0 fl    MPV 7.2 6.0 - 12.0 fL    Platelets 133 (L) 140 - 450 10*3/mm3   Scan Slide    Collection Time: 11/04/23  3:35 AM    Specimen: Blood   Result Value Ref Range    Scan Slide     Phosphorus    Collection Time: 11/04/23  3:35 AM     Specimen: Blood   Result Value Ref Range    Phosphorus 4.0 2.5 - 4.5 mg/dL   Manual Differential    Collection Time: 11/04/23  3:35 AM    Specimen: Blood   Result Value Ref Range    Neutrophil % 52.0 42.7 - 76.0 %    Lymphocyte % 8.0 (L) 19.6 - 45.3 %    Monocyte % 2.0 (L) 5.0 - 12.0 %    Eosinophil % 1.0 0.3 - 6.2 %    Bands %  27.0 (H) 0.0 - 5.0 %    Metamyelocyte % 4.0 (H) 0.0 - 0.0 %    Myelocyte % 3.0 (H) 0.0 - 0.0 %    Atypical Lymphocyte % 3.0 0.0 - 5.0 %    Neutrophils Absolute 5.06 1.70 - 7.00 10*3/mm3    Lymphocytes Absolute 0.70 0.70 - 3.10 10*3/mm3    Monocytes Absolute 0.13 0.10 - 0.90 10*3/mm3    Eosinophils Absolute 0.06 0.00 - 0.40 10*3/mm3    RBC Morphology Normal Normal    Toxic Granulation Slight/1+ None Seen    Vacuolated Neutrophils Slight/1+ None Seen    Large Platelets Slight/1+ None Seen   CK    Collection Time: 11/04/23  3:35 AM    Specimen: Blood   Result Value Ref Range    Creatine Kinase 2,950 (H) 20 - 200 U/L   Blood Gas, Arterial -    Collection Time: 11/04/23  4:17 AM    Specimen: Arterial Blood   Result Value Ref Range    Site Arterial Line     Glenroy's Test N/A     pH, Arterial 7.377 7.350 - 7.450 pH units    pCO2, Arterial 35.0 35.0 - 48.0 mm Hg    pO2, Arterial 226.6 (H) 83.0 - 108.0 mm Hg    HCO3, Arterial 20.6 (L) 21.0 - 28.0 mmol/L    Base Excess, Arterial -3.9 (L) 0.0 - 3.0 mmol/L    O2 Saturation, Arterial 99.8 (H) 94.0 - 98.0 %    CO2 Content 21.7 (L) 22 - 29 mmol/L    Barometric Pressure for Blood Gas      Modality Adult Vent     FIO2 70 %    Ventilator Mode PC     PEEP 10     PIP 25 cmH2O    Hemodilution No     Inspiratory Time 1    POC Glucose Once    Collection Time: 11/04/23  7:37 AM    Specimen: Blood   Result Value Ref Range    Glucose 59 (L) 70 - 105 mg/dL          Imaging:  XR Chest 1 View    Result Date: 11/4/2023  Impression: Appropriately placed right internal jugular CVC catheter with the tip in the mid SVC. No pneumothorax. Mild pulmonary edema pattern, improved  as compared to the previous study. Electronically Signed: Caitlyn Urbina MD  11/4/2023 12:54 AM EDT  Workstation ID: [id number]    XR Chest 1 View    Result Date: 11/3/2023  Impression: Unchanged position of right PICC. Right subclavian dual-lumen venous catheter with the distal tip at the upper to mid SVC. Adequate position of endotracheal and enteric tube. Mildly improved patchy opacities throughout the lungs which may represent true improvement versus technical differences Electronically Signed: Ellis Gibson DO  11/3/2023 5:07 PM EDT  Workstation ID: [id number]    XR Abdomen KUB    Result Date: 11/3/2023  Impression: Feeding tube terminates in the stomach. Electronically Signed: Grey Newton MD  11/3/2023 3:21 AM EDT  Workstation ID: [id number]    XR Chest 1 View    Result Date: 11/3/2023  Impression: 1.Worsening diffuse bilateral airspace disease. 2.Endotracheal tube tip 4.6 cm above the andry. 3.Right upper extremity PICC terminates in the right brachiocephalic vein. Electronically Signed: Grey Newton MD  11/3/2023 3:21 AM EDT  Workstation ID: [id number]    CT Angiogram Chest Pulmonary Embolism    Result Date: 11/3/2023  Impression: 1.Multifocal pneumonia. 2.No evidence of pulmonary embolism. Electronically Signed: Grey Newton MD  11/3/2023 12:22 AM EDT  Workstation ID: [id number]    CT Head Without Contrast    Result Date: 11/3/2023  Impression: 1.Gray-white differentiation is present, but poor throughout the brain and cerebellum. This could be related to anoxic brain injury. This could be further evaluated with follow-up brain MRI or serial CT as indicated. 2.No acute intracranial hemorrhage. Electronically Signed: Grey Newton MD  11/3/2023 12:20 AM EDT  Workstation ID: [id number]    XR Chest 1 View    Result Date: 11/2/2023  Impression: 1.ET tube tip 5.3 cm above the andry. 2.Right apical airspace disease. Electronically Signed: Grey Newton MD  11/2/2023 11:37 PM EDT  Workstation ID: [id number]      Assessment/Plan:     Cardiac arrest with successful resuscitation    Marijuana abuse    Alcohol abuse    Elevated blood sugar       Acute kidney injury   Severe metabolic/ respiratory acidosis   Cardiopulmonary arrest   ARDS   Possible anoxic brain injury   Hyperglycemia   Elevated troponin   Hypokalemia   Hypernatremia   Lactic acidosis  Recommendations:  Reduce bicarb drip to 75  Cont CRRT as pt is anuric  Correct electrolytes  Pt is still critical   32 min  D/w Rna nd intensivist

## 2023-11-04 NOTE — PROGRESS NOTES
"Pharmacy Antimicrobial Dosing Service    Subjective:  Yusuf Jin is a 52 y.o.male admitted s/p cardiac arrest with ROSC. Pharmacy has been consulted to dose Vancomycin and Cefepime for possible sepsis, PNA, bacteremia.    HPI: s/p cardiac arrest, intubated and unresponsive.       Assessment/Plan    1. Day #2 Vancomycin: Pulse dosing d/t HD status. Random this morning = 16.7 mcg/mL. Vancomycin 1250 mg IV x1 given. Plan is for CRRT to resume after re-inserting line. Will get random 11/4 at 1600 and plan to re-dose when level is expected to be < 20 mcg/mL.     2. Day #2 Cefepime: 2 IV q8h for CRRT.    Will continue to monitor drug levels, renal function, culture and sensitivities, and patient clinical status.       Objective:  Relevant clinical data and objective history reviewed:  175.3 cm (69\")   74.8 kg (165 lb)   Ideal body weight: 70.7 kg (155 lb 13.8 oz)  Adjusted ideal body weight: 72.4 kg (159 lb 8.3 oz)  Body mass index is 24.37 kg/m².    Results from last 7 days   Lab Units 11/04/23  0335   VANCOMYCIN RM mcg/mL 16.70     Results from last 7 days   Lab Units 11/04/23  0831 11/04/23  0335 11/03/23  2032   CREATININE mg/dL 3.23* 2.70* 3.61*     Estimated Creatinine Clearance: 28 mL/min (A) (by C-G formula based on SCr of 3.23 mg/dL (H)).  I/O last 3 completed shifts:  In: 6466.7 [I.V.:6233.7; Blood:233]  Out: 3260 [Urine:300; Emesis/NG output:1100; Stool:1000]    Results from last 7 days   Lab Units 11/04/23  0335 11/03/23  1026 11/03/23  0536   WBC 10*3/mm3 6.40 2.00* 2.80*     Temperature    11/04/23 0400 11/04/23 0630 11/04/23 0738   Temp: 97 °F (36.1 °C) 97.3 °F (36.3 °C) 97.3 °F (36.3 °C)     Baseline culture/source/susceptibility:  Microbiology Results (last 10 days)       Procedure Component Value - Date/Time    MRSA Screen, PCR (Inpatient) - Swab, Nares [[id number]]  (Normal) Collected: 11/03/23 1828    Lab Status: Final result Specimen: Swab from Nares Updated: 11/03/23 1954     MRSA PCR No MRSA " Detected    Narrative:      The negative predictive value of this diagnostic test is high and should only be used to consider de-escalating anti-MRSA therapy. A positive result may indicate colonization with MRSA and must be correlated clinically.    Respiratory Panel PCR w/COVID-19(SARS-CoV-2) [facility] In-House, NP Swab in UTM/VTM, 3-4 HR TAT - Swab, Nasopharynx [[id number]]  (Normal) Collected: 11/03/23 1828    Lab Status: Final result Specimen: Swab from Nasopharynx Updated: 11/03/23 1928     ADENOVIRUS, PCR Not Detected     Coronavirus 229E Not Detected     Coronavirus HKU1 Not Detected     Coronavirus NL63 Not Detected     Coronavirus OC43 Not Detected     COVID19 Not Detected     Human Metapneumovirus Not Detected     Human Rhinovirus/Enterovirus Not Detected     Influenza A PCR Not Detected     Influenza B PCR Not Detected     Parainfluenza Virus 1 Not Detected     Parainfluenza Virus 2 Not Detected     Parainfluenza Virus 3 Not Detected     Parainfluenza Virus 4 Not Detected     RSV, PCR Not Detected     Bordetella pertussis pcr Not Detected     Bordetella parapertussis PCR Not Detected     Chlamydophila pneumoniae PCR Not Detected     Mycoplasma pneumo by PCR Not Detected    Narrative:      In the setting of a positive respiratory panel with a viral infection PLUS a negative procalcitonin without other underlying concern for bacterial infection, consider observing off antibiotics or discontinuation of antibiotics and continue supportive care. If the respiratory panel is positive for atypical bacterial infection (Bordetella pertussis, Chlamydophila pneumoniae, or Mycoplasma pneumoniae), consider antibiotic de-escalation to target atypical bacterial infection.    Respiratory Culture - Sputum, ET Suction [[id number]] Collected: 11/03/23 0243    Lab Status: Preliminary result Specimen: Sputum from ET Suction Updated: 11/03/23 0652     Gram Stain Many (4+) WBCs per low power field      Rare (1+)  Epithelial cells per low power field      Few (2+) Mixed bacterial morphotypes seen on Gram Stain      Rare (1+) Yeast    Blood Culture - Blood, Arm, Right [[id number]]  (Abnormal) Collected: 11/02/23 2358    Lab Status: Preliminary result Specimen: Blood from Arm, Right Updated: 11/04/23 0806     Blood Culture Staphylococcus, coagulase negative     Isolated from Anaerobic Bottle     Gram Stain Anaerobic Bottle Gram positive cocci in clusters      Aerobic Bottle Gram positive cocci in clusters    Blood Culture ID, PCR - Blood, Arm, Right [[id number]]  (Abnormal) Collected: 11/02/23 2358    Lab Status: Edited Result - FINAL Specimen: Blood from Arm, Right Updated: 11/03/23 1854     BCID, PCR Staph spp, not aureus or lugdunensis. Identification by BCID2 PCR.     BOTTLE TYPE Anaerobic Bottle    Blood Culture - Blood, Arm, Left [[id number]]  (Normal) Collected: 11/02/23 2356    Lab Status: Preliminary result Specimen: Blood from Arm, Left Updated: 11/04/23 0015     Blood Culture No growth at 24 hours    Narrative:      Less than seven (7) mL's of blood was collected.  Insufficient quantity may yield false negative results.    COVID-19 and FLU A/B PCR - Swab, Nasopharynx [[id number]]  (Normal) Collected: 11/02/23 2354    Lab Status: Final result Specimen: Swab from Nasopharynx Updated: 11/03/23 0024     COVID19 Not Detected     Influenza A PCR Not Detected     Influenza B PCR Not Detected    Narrative:      Fact sheet for providers: https://www.fda.gov/media/859624/download    Fact sheet for patients: https://www.fda.gov/media/391283/download    Test performed by PCR.            Amelia Mccallum PharmD  11/04/23 10:36 EDT

## 2023-11-04 NOTE — PROCEDURES
"Insert Central Line At Bedside    Date/Time: 11/4/2023 12:06 PM    Performed by: Victoriano Phillip DO  Authorized by: Victoriano Phillip DO  Consent: Written consent obtained.  Risks and benefits: risks, benefits and alternatives were discussed  Consent given by: Pt's daughter.  Patient identity confirmed: arm band  Time out: Immediately prior to procedure a \"time out\" was called to verify the correct patient, procedure, equipment, support staff and site/side marked as required.  Indications: CRRT.    Sedation:  Patient sedated: no    Preparation: skin prepped with ChloraPrep  Skin prep agent dried: skin prep agent completely dried prior to procedure  Sterile barriers: all five maximum sterile barriers used - cap, mask, sterile gown, sterile gloves, and large sterile sheet  Hand hygiene: hand hygiene performed prior to central venous catheter insertion  Location details: right subclavian  Patient position: Trendelenburg  Catheter type: triple lumen  Pre-procedure: landmarks identified  Ultrasound guidance: no  Number of attempts: 1  Successful placement: yes  Post-procedure: line sutured and dressing applied  Assessment: blood return through all ports, free fluid flow, no pneumothorax on x-ray and placement verified by x-ray  Patient tolerance: patient tolerated the procedure well with no immediate complications        "

## 2023-11-04 NOTE — PROCEDURES
"Insert Central Line At Bedside    Date/Time: 11/4/2023 1:38 AM    Performed by: Sandie Mcfarland APRN  Authorized by: Sandie Mcfarland APRN  Consent: Written consent obtained.  Risks and benefits: risks, benefits and alternatives were discussed  Consent given by: Daughter.  Patient understanding: patient states understanding of the procedure being performed  Patient consent: the patient's understanding of the procedure matches consent given  Procedure consent: procedure consent matches procedure scheduled  Required items: required blood products, implants, devices, and special equipment available  Patient identity confirmed: anonymous protocol, patient vented/unresponsive, arm band and hospital-assigned identification number  Time out: Immediately prior to procedure a \"time out\" was called to verify the correct patient, procedure, equipment, support staff and site/side marked as required.  Indications: vascular access  Anesthesia: local infiltration    Anesthesia:  Local Anesthetic: lidocaine 1% without epinephrine  Anesthetic total: 5 mL    Sedation:  Patient sedated: no    Preparation: skin prepped with ChloraPrep  Skin prep agent dried: skin prep agent completely dried prior to procedure  Sterile barriers: all five maximum sterile barriers used - cap, mask, sterile gown, sterile gloves, and large sterile sheet  Hand hygiene: hand hygiene performed prior to central venous catheter insertion  Location details: right internal jugular  Patient position: flat  Catheter type: triple lumen  Catheter size: 7 Fr  Ultrasound guidance: yes  Sterile ultrasound techniques: sterile gel and sterile probe covers were used  Number of attempts: 1  Successful placement: yes  Post-procedure: line sutured and dressing applied  Assessment: blood return through all ports, free fluid flow, placement verified by x-ray and no pneumothorax on x-ray  Patient tolerance: patient tolerated the procedure well with no immediate " complications      Electronically signed by EDOUARD Rollins, 11/04/23, 1:39 AM EDT.

## 2023-11-04 NOTE — CASE MANAGEMENT/SOCIAL WORK
"Case Management/Social Work    Patient Name:  Yusuf Jin  YOB: 1970  MRN: [id number]  Admit Date:  11/2/2023    LATE ENTRY:    Update:CM spoke with patient daughter Tamiko, son Yusuf \"Butch\", and mother with Dr. Phillip to inform on current condition and discuss goals of care this afternoon around 15:00.   Daughter and Son would still like to move forward with treatment at this time and patient remains a full code. Dr. Phillip requested CM inform nurse to obtain consent for HD catheter placement and CRRT application. CM then went with  to speak to entire family to update on status. Family dynamics difficult and opinions are differing between family members on current course of care. CM reminded family that hospital has legal obligations and that patient's children were in control of making decisions about treatment. Family verbalized understanding. All family were asked to move to waiting room so procedure could be performed. CM kept family informed that patient tolerated procedure well. Family informed that visitation from this point on will be limited to one person at a time however they are welcome to switch out as frequently as they liked as long as care was not impeded in any way. Family verbalized understanding. CM updated nursing that family had been educated on visitation parameters. After CM returned to the unit, the patient started to destaturate and had to be placed in prone position again. HD cath was successfully placed prior to pronation. Plan to start CRRT this evening.      Electronically signed by:  Pauline Rm RN  11/03/23 20:01 EDT    Office Phone: [phone]  Office Cell:     [phone]    "

## 2023-11-04 NOTE — PROGRESS NOTES
LOS: 1 day     Chief Complaint: Patient found down       SUBJECTIVE:    History taken from: chart family RN    Interval History: Yusuf Jin was admitted on 11/2/2023 and I saw him yesterday    Today his sedation was stopped around 9 AM    I saw him a few hours later and he is [place] Coma Scale 3T    No corneals    No doll's eyes  No pupillary responses      No cough or gag or respiratory effort    I talked to the family in very very detail, and they are quite hopeful for him to improve and I told him that realistically I am concerned that that may not happen    We will continue evaluation to see how things go and if he is not improving in the near future then I do recommend brain perfusion scan unless the family want to go comfort care before that    I do recommend and I mean recommend not order, DNR        - Portions of the above HPI were copied from previous encounters and edited as appropriate.    Patient Complaints: Not possible      Review of Systems   Not possible    Pertinent PMH:  has no past medical history on file.   ________________________________________________     OBJECTIVE:  Intubated but not sedated    Neurologic Exam    Comatose exam  PHYSICAL EXAM:    Constitutional: The patient is in no apparent distress, intubated on ventilator without sedation. Comatose.     Head: Normocephalic, atraumatic.     Chest: Intubated on ventilator    Cardiac: Regular rate and rhythm.     Extremities:  No clubbing, cyanosis. Mild edema all extremities    NEUROLOGICAL:    Cognition:   Comatose, no sedation  No response    Cranial nerves;  0/3 eye signs, pupils fixed at 1 mm  no apparent facial asymmetry     Sensory:  No response to painful stimuli    Motor: No spontaneous movements, no withdraw to painful stimuli    Cerebellar and gait not able to be tested as pt is comatose.      ________________________________________________   RESULTS REVIEW    VITAL SIGNS:  Temp:  [96.1 °F (35.6 °C)-100.9 °F (38.3  °C)] 97.7 °F (36.5 °C)  Heart Rate:  [106-143] 130  Resp:  [28-30] 28  BP: ()/() 112/49  FiO2 (%):  [45 %-80 %] 45 %    LABS:       Lab 11/04/23  0831 11/04/23  0335 11/03/23  2032 11/03/23  1756 11/03/23  1257 11/03/23  1026 11/03/23  0536 11/03/23  0359 11/03/23  0231 11/03/23  0151 11/02/23  2345 11/02/23  2323   WBC  --  6.40  --   --   --  2.00* 2.80*  --   --   --   --  7.50   HEMOGLOBIN 13.1 14.6 14.0 15.5  --  16.0 16.1  --   --   --   --  13.6   HEMOGLOBIN, POC  --   --   --   --   --   --   --  15.1  --  14.6  --   --    HEMATOCRIT 38.6 42.7 41.9 45.3  --  47.8 47.6  --   --   --   --  40.7   HEMATOCRIT POC  --   --   --   --   --   --   --  44  --  43  --   --    PLATELETS  --  133*  --   --   --  231 242  --   --   --   --  229   NEUTROS ABS  --  5.06  --   --   --   --  1.40*  --   --   --   --  2.40   LYMPHS ABS  --   --   --   --   --   --  1.20  --   --   --   --   --    MONOS ABS  --   --   --   --   --   --  0.10  --   --   --   --   --    EOS ABS  --  0.06  --   --   --   --  0.00  --   --   --   --   --    MCV  --  95.4  --   --   --  97.7* 97.3*  --   --   --   --  99.0*   LACTATE  --   --   --   --   --  7.7*  --  6.9* 6.7* 7.1* 7.7*  --    PROTIME  --   --   --   --  13.0*  --   --   --   --   --   --   --    APTT  --   --   --   --  25.6  --   --   --   --   --   --   --          Lab 11/04/23  0831 11/04/23  0335 11/03/23 2032 11/03/23  1756 11/03/23  1026 11/03/23  0536 11/03/23  0151 11/02/23  2323   SODIUM 141 144 148*  --  146* 147*  --  142   POTASSIUM 5.6* 4.4 2.8*  --  3.1* 3.1*  --  3.9   CHLORIDE 106 109* 112*  --  114* 113*  --  102   CO2 23.0 21.0* 20.0*  --  16.0* 17.0*  --  21.0*   ANION GAP 12.0 14.0 16.0*  --  16.0* 17.0*  --  19.0*   BUN 24* 20 26*  --  17 14  --  11   CREATININE 3.23* 2.70* 3.61*  --  2.71* 2.15*   < > 1.33*   EGFR 22.0* 27.3* 19.3*  --  27.2* 35.9*   < > 63.9   GLUCOSE 207* 101* 260*  --  128* 55*  --  266*   CALCIUM 6.9* 7.7* 7.3*  --  8.2*  8.3*  --  9.1   IONIZED CALCIUM 0.94* 1.03*  --  1.11*  --   --   --   --    MAGNESIUM 1.8 1.4* 1.3*  --   --  1.9  --  2.4   PHOSPHORUS 2.6 4.0 1.6*  --   --  4.0  --   --    HEMOGLOBIN A1C  --   --   --   --   --  5.10  --   --    TSH  --   --   --   --   --  1.920  --   --     < > = values in this interval not displayed.         Lab 11/04/23  0831 11/04/23  0335 11/03/23 2032 11/03/23  0536 11/02/23  2323   TOTAL PROTEIN  --  5.1*  --  5.5* 6.3   ALBUMIN 2.5* 2.8* 2.6* 3.5 3.9   GLOBULIN  --  2.3  --  2.0 2.4   ALT (SGPT)  --  511*  --  163* 93*   AST (SGOT)  --  1,287*  --  433* 154*   BILIRUBIN  --  0.7  --  0.4 0.2   ALK PHOS  --  43  --  79 65         Lab 11/03/23  1257 11/03/23  0231 11/02/23  2323   PROBNP  --   --  <36.0   HSTROP T  --  298* 10   PROTIME 13.0*  --   --    INR 1.21*  --   --          Lab 11/03/23  0536   CHOLESTEROL 192   LDL CHOL 116*   HDL CHOL 60   TRIGLYCERIDES 90         Lab 11/03/23  1026   ABO TYPING O   RH TYPING Positive         Lab 11/04/23  0417 11/03/23  1718 11/03/23  1308   PH, ARTERIAL 7.377 7.279* 7.130*   PCO2, ARTERIAL 35.0 42.7 57.8*   PO2 .6* 376.7* 181.0*   O2 SATURATION ART 99.8* 99.9* 99.1*   FIO2 70 100 100   HCO3 ART 20.6* 20.0* 19.3*   BASE EXCESS ART -3.9* -6.6* -10.7*     UA          11/2/2023    23:33   Urinalysis   Squamous Epithelial Cells, UA 0-2    Specific Gravity, UA <=1.005    Ketones, UA Negative    Blood, UA Trace    Leukocytes, UA Negative    Nitrite, UA Negative    RBC, UA None Seen    WBC, UA 0-2    Bacteria, UA None Seen        Lab Results   Component Value Date    TSH 1.920 11/03/2023     (H) 11/03/2023    HGBA1C 5.10 11/03/2023         IMAGING STUDIES:  XR Chest 1 View    Result Date: 11/4/2023  Impression: Appropriately placed right internal jugular CVC catheter with the tip in the mid SVC. No pneumothorax. Mild pulmonary edema pattern, improved as compared to the previous study. Electronically Signed: Caitlyn Urbina MD  11/4/2023  12:54 AM EDT  Workstation ID: [id number]    XR Chest 1 View    Result Date: 11/3/2023  Impression: Unchanged position of right PICC. Right subclavian dual-lumen venous catheter with the distal tip at the upper to mid SVC. Adequate position of endotracheal and enteric tube. Mildly improved patchy opacities throughout the lungs which may represent true improvement versus technical differences Electronically Signed: Ellis Gibson DO  11/3/2023 5:07 PM EDT  Workstation ID: [id number]    XR Abdomen KUB    Result Date: 11/3/2023  Impression: Feeding tube terminates in the stomach. Electronically Signed: Grey Newton MD  11/3/2023 3:21 AM EDT  Workstation ID: [id number]    XR Chest 1 View    Result Date: 11/3/2023  Impression: 1.Worsening diffuse bilateral airspace disease. 2.Endotracheal tube tip 4.6 cm above the andry. 3.Right upper extremity PICC terminates in the right brachiocephalic vein. Electronically Signed: Grey Newton MD  11/3/2023 3:21 AM EDT  Workstation ID: [id number]    CT Angiogram Chest Pulmonary Embolism    Result Date: 11/3/2023  Impression: 1.Multifocal pneumonia. 2.No evidence of pulmonary embolism. Electronically Signed: Grey Newton MD  11/3/2023 12:22 AM EDT  Workstation ID: [id number]    CT Head Without Contrast    Result Date: 11/3/2023  Impression: 1.Gray-white differentiation is present, but poor throughout the brain and cerebellum. This could be related to anoxic brain injury. This could be further evaluated with follow-up brain MRI or serial CT as indicated. 2.No acute intracranial hemorrhage. Electronically Signed: Grey Newton MD  11/3/2023 12:20 AM EDT  Workstation ID: [id number]    XR Chest 1 View    Result Date: 11/2/2023  Impression: 1.ET tube tip 5.3 cm above the andry. 2.Right apical airspace disease. Electronically Signed: Grey Newton MD  11/2/2023 11:37 PM EDT  Workstation ID: [id number]     I reviewed the patient's new clinical  results.    ________________________________________________      PROBLEM LIST:    Cardiac arrest with successful resuscitation    Marijuana abuse    Alcohol abuse    Elevated blood sugar        ASSESSMENT/PLAN:  Cardiac arrest  Suspected anoxic encephalopathy    Suspected poor prognosis    Family to decide    We will follow-up    **Please refer to previous notes for further details and recommendations.     I discussed the patient's findings and my recommendations with family, nursing staff, and consulting provider    Chacho Sher MD  11/04/23  16:02 EDT

## 2023-11-04 NOTE — CONSULTS
Picc team consult:    Picc line termination point not optimal.  Patient would benefit from new central line placement.  Primary RN notified.

## 2023-11-04 NOTE — PROCEDURES
Insert Arterial Line    Date/Time: 11/4/2023 1:39 AM    Performed by: Sandie Mcfarland APRN  Authorized by: Sandie Mcfarland APRN    Universal Protocol:     Written consent obtained?: Yes      Risks and benefits: Risks, benefits and alternatives were discussed      Consent given by: Daughter.    Patient states understanding of procedure being performed: Yes      Patient's understanding of procedure matches consent: Yes      Procedure consent matches procedure scheduled: Yes      Required items: Required blood products, implants, devices and special equipment available      Patient identity confirmed:  Arm band, hospital-assigned identification number and anonymous protocol, patient vented/unresponsive    Time out: Immediately prior to the procedure a time out was called    A time out verifies correct patient, procedure, equipment, support staff and site/side marked as required:   Preparation:     Preparation: Patient was prepped and draped in usual sterile fashion    Indications:     Indications: multiple ABGs, respiratory failure and hemodynamic monitoring    Location:     Location:  Left brachial  Procedure Details:     Ultrasound Guidance: yes  The ultrasound was used for evaluation of possible access sites.  Vessel patency was confirmed with the ultrasound.  Needle entry into vessel was visualized in realtime with the ultrasound.       Seldinger technique: Seldinger technique used      Number of attempts:  1  Post-procedure:     Post-procedure:  Line sutured and dressing applied    Post-procedure CMS:  Normal     patient tolerated the procedure well with no immediate complications  Electronically signed by EDOUARD Rollins, 11/04/23, 1:40 AM EDT.

## 2023-11-04 NOTE — CONSULTS
Nutrition Services    Patient Name: Yusuf Jin  YOB: 1970  MRN: [id number]  Admission date: 11/2/2023    EN Prescription: RD has worked up recommendations for TF and will enter placeholder order for TF. Cannot begin infusion yet, as pt remains too hemodynamically unstable to feed. Still on three pressors + CRRT and MAP of 48 within the last hour. Will continue to watch closely for ability to start EN.    PPE Documentation        PPE Worn By Provider gloves   PPE Worn By Patient  None     CLINICAL NUTRITION ASSESSMENT      Reason for Assessment 11/14: Consult for tube feeding      H&P      History reviewed. No pertinent past medical history.    No past surgical history on file.     Current Problems   Cardiopulmonary arrest with successful resuscitation;  Cardiogenic shock; Pulmonary edema  -Etiology suspected to be drug overdose of fentanyl and synthetic marijuana  -on pressors  - intubated 11/2     Acute hypoxemic respiratory failure; Multifocal aspiration PNA  -pulmonology following   - remains on vent      Acute hypoxic encephalopathy; Global anoxic brain injury  -Neurology following     CONOR 2/2 ATN; Hyperkalemia; Rhabdomyolysis  -Nephrology following  -on CRRT      Hypoglycemia  -Pt on GTT      Possible GP bacteremia       Encounter Information        Trending Narrative     11/4: Received consult for tube feeding. RD saw pt yesterday 11/3 also for TF evaluation, but at that time was too unstable to begin enteral nutrition. Today, pt still is requiring multiple pressors -- currently on levophed, kelle and vasopressin, as well as CRRT. MAP at 1310 (half an hour ago) was only 48. At this point, hesitate to initiate even low rate of feeding due to three pressors and hemodynamic instability. Do not want to increase risk of GI ischemia. Will continue to check in on patient later today and potentially start trickle feed later if stable enough to feed. Did not complete NFPE on this occasion due to  "other cares being conducted - will follow up.     Anthropometrics        Current Height, Weight Height: 175.3 cm (69\")  Weight: 74.8 kg (165 lb) (11/04/23 1003)       Usual Body Weight (UBW) UTD       Trending Weight Hx     This admission: 11/4: Scale weight 74.8 kg              PTA: 11/4: No extended weight Hx available     Wt Readings from Last 30 Encounters:   11/04/23 1003 74.8 kg (165 lb)   11/03/23 0110 74.9 kg (165 lb 2 oz)   11/02/23 2345 72 kg (158 lb 11.7 oz)      BMI kg/m2 Body mass index is 24.37 kg/m².       Labs        Pertinent Labs    Results from last 7 days   Lab Units 11/04/23 0831 11/04/23 0335 11/03/23 2032 11/03/23  1026 11/03/23  0536 11/03/23  0151 11/02/23  2323   SODIUM mmol/L 141 144 148*   < > 147*  --  142   POTASSIUM mmol/L 5.6* 4.4 2.8*   < > 3.1*  --  3.9   CHLORIDE mmol/L 106 109* 112*   < > 113*  --  102   CO2 mmol/L 23.0 21.0* 20.0*   < > 17.0*  --  21.0*   BUN mg/dL 24* 20 26*   < > 14  --  11   CREATININE mg/dL 3.23* 2.70* 3.61*   < > 2.15*   < > 1.33*   CALCIUM mg/dL 6.9* 7.7* 7.3*   < > 8.3*  --  9.1   BILIRUBIN mg/dL  --  0.7  --   --  0.4  --  0.2   ALK PHOS U/L  --  43  --   --  79  --  65   ALT (SGPT) U/L  --  511*  --   --  163*  --  93*   AST (SGOT) U/L  --  1,287*  --   --  433*  --  154*   GLUCOSE mg/dL 207* 101* 260*   < > 55*  --  266*    < > = values in this interval not displayed.     Results from last 7 days   Lab Units 11/04/23 0831 11/04/23 0335 11/03/23 2032 11/03/23  1026 11/03/23  0536   MAGNESIUM mg/dL 1.8 1.4* 1.3*  --  1.9   PHOSPHORUS mg/dL 2.6 4.0 1.6*  --  4.0   HEMOGLOBIN g/dL 13.1 14.6 14.0   < > 16.1   HEMATOCRIT % 38.6 42.7 41.9   < > 47.6   TRIGLYCERIDES mg/dL  --   --   --   --  90    < > = values in this interval not displayed.     Lab Results   Component Value Date    HGBA1C 5.10 11/03/2023        Medications    Scheduled Medications cefepime, 2,000 mg, Intravenous, Q8H  chlorhexidine, 15 mL, Mouth/Throat, Q12H  fomepizole (ANTIZOL) 750 mg " in sodium chloride 0.9 % 100 mL IVPB, 10 mg/kg, Intravenous, Q8H  insulin lispro, 2-7 Units, Subcutaneous, Q6H  levETIRAcetam, 750 mg, Intravenous, Q12H  metroNIDAZOLE, 500 mg, Intravenous, Q8H  pantoprazole, 40 mg, Intravenous, Q12H  sodium chloride, 10 mL, Intravenous, Q12H  sodium chloride, 10 mL, Intravenous, Q12H  sodium chloride, 10 mL, Intravenous, Q12H        Infusions dextrose, 50 mL/hr, Last Rate: 50 mL/hr (11/04/23 1149)  DOBUTamine, 5 mcg/kg/min, Last Rate: 5 mcg/kg/min (11/04/23 1316)  norepinephrine, 0.02-0.5 mcg/kg/min, Last Rate: 0.4 mcg/kg/min (11/04/23 0423)  Pharmacy to Dose Cefepime,   phenylephrine, 0.5-6 mcg/kg/min, Last Rate: 3 mcg/kg/min (11/04/23 1310)  Phoxillum BK4/2.5, 2,000 mL/hr, Last Rate: 2,000 mL/hr (11/04/23 1220)  Phoxillum BK4/2.5, 2,000 mL/hr, Last Rate: 2,000 mL/hr (11/04/23 1220)  Phoxillum BK4/2.5, 2,000 mL/hr, Last Rate: 2,000 mL/hr (11/04/23 1219)  sodium bicarbonate, 150 mEq, Last Rate: 75 mL/hr at 11/04/23 0848  vasopressin, 0.01-0.1 Units/min, Last Rate: 0.1 Units/min (11/04/23 1314)        PRN Medications   acetaminophen **OR** acetaminophen    artificial tears    senna-docusate sodium **AND** polyethylene glycol **AND** bisacodyl **AND** bisacodyl    Calcium Replacement - Follow Nurse / BPA Driven Protocol    dextrose    dextrose    fentaNYL citrate (PF)    glucagon (human recombinant)    heparin (porcine)    ipratropium-albuterol    Magnesium Standard Dose Replacement - Follow Nurse / BPA Driven Protocol    nitroglycerin    ondansetron **OR** ondansetron    Pharmacy to Dose Cefepime    Phosphorus Replacement - Follow Nurse / BPA Driven Protocol    Potassium Replacement - Follow Nurse / BPA Driven Protocol    sodium chloride    sodium chloride    sodium chloride    sodium chloride    sodium chloride    sodium chloride    sodium chloride     Physical Findings        Trending Physical   Appearance, NFPE 11/4: Unable to complete NFPE today - will follow up   --  Edema  2+  generalized      Bowel Function BM 11/3     Tubes Has R NG in place      Chewing/Swallowing Unable - is on vent     Skin No pressure injuries documented        Estimated/Assessed Needs    Assessed 11/4/23   Energy Requirements    EST Needs, Method, Wt used 1993 kcal/day (PSU 2003b using Tmax 36.5 and L/min 19)       Protein Requirements    EST Needs, Method, Wt used 113-150 g/day (1.5-2 g/kg BW) -- due to CRRT, critical illness       Fluid Requirements     Estimated Needs (mL/day) 500mL + total output -- R/t CONOR     Current Nutrition Orders & Evaluation of Intake       Oral Nutrition     Food Allergies NKFA   Current PO Diet NPO Diet NPO Type: Strict NPO   Supplement None    PO Evaluation     Trending % PO Intake 11/4: NPO since admission      Enteral Nutrition    Enteral Route NG in place    Order, Modulars, Flushes    Residual/Tolerance    TF Observation  Still too unstable to start TF 11/4; will continue to follow closely        Parenteral Nutrition     TPN Route    Total # Days on TPN    TPN Order, Lipid Details    MVI & Trace Element Freq    TPN Observation         Nutrition Course Details    PO Diets: 11/4: NPO since admission    Nutrition Support: 11/4: Working up TF - not yet safe to feed      Nutritional Risk Screening        NRS-2002 Score          Nutrition Diagnosis         Nutrition Dx Problem 1 Inadequate oral intake R/t inability to take diet PO; as evidenced by intubation/vent support.      Nutrition Dx Problem 2        Intervention Goal         Intervention Goal(s) Pt able to start EN within 24 hours, pending hemodynamic stability      Nutrition Intervention        RD Action Will work up TF for when ready to begin      Nutrition Prescription          Diet Prescription NPO   Supplement Prescription None ordered      Enteral Prescription Initial Goal:  *initial goal conservative to establish tolerance in the setting of critical illness      [facility] 10[facility]/hr + 10mL/hr water flush      End  Goal:  [facility] Renal at 40 mL/hr and will adjust water flush as indicated     With Prosource TF, 4 packets daily      Calories  1760 kcals from formula  160 kcal Prosource TF   1920 kcal total (96%)    Protein  80 g from formula  44 g Prosource TF   124g total (100% in range)    Free water  634 mL   Flushes  Will adjust as indicated      The above end goal rate is for 22 hrs/day to assume interruptions for ADLs. Water flushes adjusted based on clinical picture + Rx flushes/IV fluids     Specialized formula chosen r/t renal impairment, hyperkalemia.         TPN Prescription      Monitor/Evaluation        Monitor I&O, Pertinent labs, EN delivery/tolerance, Weight, Skin status, GI status, POC/GOC, Hemodynamic stability     Electronically signed by:  Jane Ibarra RD  11/04/23 13:19 EDT

## 2023-11-04 NOTE — PROGRESS NOTES
Critical Care Progress Note   Yusuf Jin : 1970 MRN:[id number] LOS:1     Principal Problem: Cardiac arrest with successful resuscitation     Reason for follow up: All the medical problems listed below    Summary     A 52 y.o. male admitted with a principal diagnosis of Cardiac arrest with successful resuscitation.      Significant events     23 : Labs, I/O, etc reviewed.  Case d/w family at bedside.    Assessment / Plan     Cardiopulmonary arrest with successful resuscitation  Cardiogenic shock  Pulmonary edema  -Etiology suspected to be drug overdose of fentanyl and synthetic marijuana  -TTE shows EF to only be 25-30%  -Initially was told osmolar gap was significantly elevated and pt was started on fomepizole.  Toxic organic alcohol panel was sent, but negative.  Repeat calculation showed osmolar gap to only be 0.1.  Fomepizole discontinued  -On levophed, vasopressin, and dobutamine.  Goal SBP >60 mmHg.      Acute hypoxemic respiratory failure  Multifocal aspiration PNA  -On mechanical ventilation  -Requiring fairly low settings  -Wean FiO2 as tolerates  -RVP negative  -MRSA PCR neg  -Sputum Cx pending  -On empiric cefepime and flagyl      Acute hypoxic encephalopathy  Global anoxic brain injury  -Suspect significant down-time prior to arrival of EMS  -Initial CT Head on presentation already showed signs of gray-white differentiation poor throughout the brain and cerebellum. This could be related to anoxic brain injury.   -Pt has corneal reflex only  -Neurology following        CONOR 2/2 ATN  Hyperkalemia  Rhabdomyolysis  -Nephrology following  -Pt on bicarb gtt  -Initial Vascath quit working, had to replace on   -Pt on CRRT      Hypoglycemia  -Pt on D10 gtt  -Maintain 120-180      Possible GP bacteremia  -2 bottles of initial Bcx's with CoNS; other set negative  -Repeat Bcx's send         Overall prognosis:  Poor      Critical Care Time:  42 mins.      Code status:   Level Of Support  Discussed With: Next of Kin (If No Surrogate)  Code Status (Patient has no pulse and is not breathing): CPR (Attempt to Resuscitate)  Medical Interventions (Patient has pulse or is breathing): Full Support       Nutrition: NPO Diet NPO Type: Strict NPO   Patient isn't on Tube Feeding    DVT prophylaxis:  Medical and mechanical DVT prophylaxis orders are present.     Subjective / Review of systems     Review of Systems   Unable to be obtained 2/2 pt's current condition.    Objective / Physical Exam   Vital signs:  Temp: 97.3 °F (36.3 °C)  BP: (!) 180/36  Heart Rate: (!) 134  Resp: 28  SpO2: 100 %  Weight: 74.8 kg (165 lb)    Admission Weight: Weight: 72 kg (158 lb 11.7 oz)  Current Weight: Weight: 74.8 kg (165 lb)    Input/Output in last 24 hours:    Intake/Output Summary (Last 24 hours) at 11/4/2023 1209  Last data filed at 11/4/2023 1107  Gross per 24 hour   Intake 7226.72 ml   Output 3160 ml   Net 4066.72 ml      Physical Exam       GEN:  Middle-aged man, intubated, not sedated, on vent.  Unresponsive/comatose.  NEURO:  Corneal reflex intact.  No cough or gag.  Pupils equal and sluggish to react.  No response to verbal/painful stimuli.  HEENT:  N/AT.  PERRL.  MMM.  Oropharynx non-erythematous.  No drainage from the eyes/ears/nose.  No conjunctival petechiae.  No oral thrush. ETT in place.  NECK:  Supple, trachea midline.  No meningismus.    CHEST/LUNGS:  Breath sounds are diminished and slightly coarse bilaterally.  Chest excursion equal bilaterally.   CARDIOVASCULAR:  Tachycardic, RR w/o murmur noted.   GI:  Abdomen soft, ND, trace BS.  :  Normal external genitalia.  Shepherd cath in place.  EXTREMITIES:  No deformity or amputation.  No cyanosis or asymmetry.  2+ edema in all extremities.  Pulses 2+ and equal in BLE's.    SKIN:  Warm, dry, and pink.  No rash, breakdown, or track marks noted.  LYMPHATICS/HEME:  No overt LAD or abnormal bruising.    MSK:  No joint abnormalities noted.  PSYCH:  Unable to assess 2/2  pt's current condition.          Radiology and Labs     Results from last 7 days   Lab Units 11/04/23  0831 11/04/23  0335 11/03/23 2032 11/03/23  1756 11/03/23  1026 11/03/23  0536 11/03/23  0151 11/02/23  2323   WBC 10*3/mm3  --  6.40  --   --  2.00* 2.80*  --  7.50   HEMATOCRIT % 38.6 42.7 41.9 45.3 47.8 47.6  --  40.7   HEMATOCRIT POC   --   --   --   --   --   --    < >  --    PLATELETS 10*3/mm3  --  133*  --   --  231 242  --  229    < > = values in this interval not displayed.      Results from last 7 days   Lab Units 11/04/23  0831 11/04/23 0335 11/03/23 2032 11/03/23  1026 11/03/23  0536   SODIUM mmol/L 141 144 148* 146* 147*   POTASSIUM mmol/L 5.6* 4.4 2.8* 3.1* 3.1*   CHLORIDE mmol/L 106 109* 112* 114* 113*   CO2 mmol/L 23.0 21.0* 20.0* 16.0* 17.0*   BUN mg/dL 24* 20 26* 17 14   CREATININE mg/dL 3.23* 2.70* 3.61* 2.71* 2.15*      Current medications   Scheduled Meds: cefepime, 2,000 mg, Intravenous, Q8H  chlorhexidine, 15 mL, Mouth/Throat, Q12H  fomepizole (ANTIZOL) 750 mg in sodium chloride 0.9 % 100 mL IVPB, 10 mg/kg, Intravenous, Q8H  insulin lispro, 2-7 Units, Subcutaneous, Q6H  levETIRAcetam, 750 mg, Intravenous, Q12H  pantoprazole, 40 mg, Intravenous, Q12H  sodium chloride, 10 mL, Intravenous, Q12H  sodium chloride, 10 mL, Intravenous, Q12H  sodium chloride, 10 mL, Intravenous, Q12H      Continuous Infusions: dextrose, 50 mL/hr, Last Rate: 50 mL/hr (11/04/23 1149)  EPINEPHrine, 0.02-0.5 mcg/kg/min, Last Rate: Stopped (11/04/23 0424)  epoprostenol, 50 ng/kg/min (Ideal), Last Rate: 50 ng/kg/min (11/04/23 0509)  norepinephrine, 0.02-0.5 mcg/kg/min, Last Rate: 0.4 mcg/kg/min (11/04/23 0423)  Pharmacy to Dose Cefepime,   Pharmacy to dose vancomycin,   phenylephrine, 0.5-6 mcg/kg/min, Last Rate: Stopped (11/03/23 2200)  Phoxillum BK4/2.5, 2,000 mL/hr  Phoxillum BK4/2.5, 2,000 mL/hr  Phoxillum BK4/2.5, 2,000 mL/hr, Last Rate: 2,000 mL/hr (11/04/23 0600)  sodium bicarbonate, 150 mEq, Last Rate: 75 mL/hr  at 11/04/23 0848  vasopressin, 0.01-0.1 Units/min, Last Rate: 0.03 Units/min (11/03/23 0830)        Plan discussed with RN. Reviewed all other data in the last 24 hours, including but not limited to vitals, labs, microbiology, imaging and pertinent notes from other providers.     Victoriano Phillip, DO   Critical Care  11/04/23   12:09 EDT

## 2023-11-04 NOTE — PROCEDURES
"Insert Central Line At Bedside    Date/Time: 11/4/2023 12:08 PM    Performed by: Victoriano Phillip DO  Authorized by: Victoriano Phillip DO  Consent: Written consent obtained.  Risks and benefits: risks, benefits and alternatives were discussed  Consent given by: Pt's daughter.  Patient identity confirmed: arm band  Time out: Immediately prior to procedure a \"time out\" was called to verify the correct patient, procedure, equipment, support staff and site/side marked as required.  Indications: CRRT.    Sedation:  Patient sedated: no    Preparation: skin prepped with ChloraPrep  Skin prep agent dried: skin prep agent completely dried prior to procedure  Sterile barriers: all five maximum sterile barriers used - cap, mask, sterile gown, sterile gloves, and large sterile sheet  Location details: right femoral  Patient position: flat  Catheter type: triple lumen  Pre-procedure: landmarks identified  Ultrasound guidance: no  Number of attempts: 1  Successful placement: yes  Post-procedure: line sutured and dressing applied  Assessment: blood return through all ports and free fluid flow  Patient tolerance: patient tolerated the procedure well with no immediate complications        "

## 2023-11-05 NOTE — PROGRESS NOTES
Critical Care Progress Note   Yusuf Jin : 1970 MRN:[id number] LOS:2     Principal Problem: Cardiac arrest with successful resuscitation     Reason for follow up: All the medical problems listed below    Summary     A 52 y.o. male admitted with a principal diagnosis of Cardiac arrest with successful resuscitation.      Significant events     23 : Intubated with no sedation. Sluggish corneal reflex, no other brainstem reflexes. MRI brain shows diffuse anoxic brain damage. Abdomen firm with no bowel sounds this morning and hgb dropping, CT abd/pelvis showed nonspecific fluid and mixed liquid stool throughout the abdomen without signs of overt obstruction or active inflammation. Increasing patchy airspace opacities in the lower lobes suspicious for pneumonia versus aspiration.  Retained contrast in the right kidney suggesting acute tubular necrosis. No hydronephrosis.  Findings of mild volume overload/third spacing including body wall edema, presacral edema and trace ascites.  Remains on CRRT. Pressor support with Levo and vaso. Dobutamine currently held for MRI without change in vital signs. Remains on HCO3 and D10 gtts. Holding off tube feeds for now. Right hand cool with dusky digits and fleeting pulse, will get RUE arterial doppler.    Assessment / Plan     Cardiopulmonary arrest with successful resuscitation  Cardiogenic shock  Pulmonary edema  -Etiology suspected to be drug overdose of fentanyl and synthetic marijuana  -TTE shows EF to only be 25-30%  -Initially was told osmolar gap was significantly elevated and pt was started on fomepizole.  Toxic organic alcohol panel was sent, but negative.  Repeat calculation showed osmolar gap to only be 0.1.  Fomepizole discontinued.  -With Levo and vaso. Dobutamine.  Goal SBP >60 mmHg.  -Beta blocker added d/t tachycardia      Acute hypoxemic respiratory failure  Multifocal aspiration PNA  -On mechanical ventilation  -Requiring fairly low  "settings  -Wean FiO2 as tolerates  -RVP negative  -MRSA PCR neg  -Sputum Cx pending  -On empiric cefepime and flagyl--both started 11/4      Global anoxic brain damage  -Suspect significant down-time prior to arrival of EMS  -Initial CT Head on presentation already showed signs of gray-white differentiation poor throughout the brain and cerebellum. This could be related to anoxic brain injury.   -Pt has corneal reflex only  -Neurology following  -MRI brain 11/5 confirms diffuse, global anoxic brain damage  -No chance of meaningful neurologic recovery      CONOR 2/2 ATN  Hyperkalemia  Rhabdomyolysis  -Nephrology following  -Pt on bicarb gtt  -Initial R SC Vascath quit working. Right femoral shiley placed 11/4. R SC shiley left in place due to risk of bleeding  -Pt on CRRT      Hypoglycemia  -Pt on D10 gtt  -Maintain 120-180  -Start TF's      Possible GP bacteremia  -2 bottles of initial Bcx's with CoNS; other set negative  -Repeat Bcx's send 11/4, results pending and NTD        Overall prognosis:  grim      Long d/w pt's daughter this morning and explained that this degree of brain damage, the best QOL he will have will be to be in a NH, dependent upon someone to change his diapers, have the machine breath for him, and someone will have to feed him through his abdomen.  Despite these findings, she states that she's \"not ready to give up\" and that they wish to proceed with trach/PEG/NH placement.  Palliative care consulted.  CM/SW consulted for placement.           Critical Care Time:  47 mins.        Code status:   Level Of Support Discussed With: Next of Kin (If No Surrogate)  Code Status (Patient has no pulse and is not breathing): CPR (Attempt to Resuscitate)  Medical Interventions (Patient has pulse or is breathing): Full Support       Nutrition: NPO Diet NPO Type: Strict NPO   Diet, Tube Feeding Tube Feeding Formula: RD to Initiate & Manage; Tube Feeding Type: RD to Manage    DVT prophylaxis:  Medical and " mechanical DVT prophylaxis orders are present.     Subjective / Review of systems     Review of Systems   Unable to be obtained 2/2 pt's current condition, intubated and unresponsive    Objective / Physical Exam   Vital signs:  Temp: (!) 100.8 °F (38.2 °C)  BP: 131/76  Heart Rate: (!) 124  Resp: (!) 30  SpO2: 100 %  Weight: 80.5 kg (177 lb 7.5 oz)    Admission Weight: Weight: 72 kg (158 lb 11.7 oz)  Current Weight: Weight: 80.5 kg (177 lb 7.5 oz)    Input/Output in last 24 hours:    Intake/Output Summary (Last 24 hours) at 11/5/2023 0821  Last data filed at 11/5/2023 0800  Gross per 24 hour   Intake 6405 ml   Output 5358 ml   Net 1047 ml      Physical Exam     GEN:  Middle-aged man, intubated, not sedated, on vent.  Unresponsive/comatose. Toxic-appearing  NEURO:  Corneal reflex intact.  No cough or gag.  No response to verbal/painful stimuli.  HEENT:  N/AT.  Pupils pinpoint, no reaction to light. No drainage from the eyes/ears/nose.  ++ conjunctival petechiae. OETT in place. Large amount of thick, clear oral secretions  NECK:  Supple, trachea midline.  No JVD.  RIJ central line. RSC shiley  CHEST/LUNGS:  Breath sounds are coarse but clear and equal bilaterally.  Chest excursion equal bilaterally.   CARDIOVASCULAR:  Tachycardic, RR w/o murmur noted.   GI:  abdomen firm, no bowel sounds   :  Normal external genitalia.  Shepherd cath in place.  EXTREMITIES:  No deformity or amputation.  Right fingers dusky and cold.  No palpable right radial pulse. Left hand cool, fingertips slightly dusky. Trace generalized edema  SKIN:  cool and dry. No rash   MSK:  No joint abnormalities noted.  PSYCH:  Unable to assess 2/2 pt's current condition.  Intubated and unresponsive        Radiology and Labs     Results from last 7 days   Lab Units 11/05/23  0756 11/05/23  0030 11/04/23  0831 11/04/23  0335 11/03/23  2032 11/03/23  1756 11/03/23  1026 11/03/23  0536 11/03/23  0151 11/02/23  2323   WBC 10*3/mm3  --  10.70  --  6.40  --   --   2.00* 2.80*  --  7.50   HEMATOCRIT % 31.1* 34.8* 38.6 42.7 41.9   < > 47.8 47.6  --  40.7   HEMATOCRIT POC   --   --   --   --   --   --   --   --    < >  --    PLATELETS 10*3/mm3  --  81*  --  133*  --   --  231 242  --  229    < > = values in this interval not displayed.      Results from last 7 days   Lab Units 11/05/23  0030 11/04/23  1549 11/04/23  0831 11/04/23  0335 11/03/23 2032   SODIUM mmol/L 140 140 141 144 148*   POTASSIUM mmol/L 4.8 5.7* 5.6* 4.4 2.8*   CHLORIDE mmol/L 104 105 106 109* 112*   CO2 mmol/L 24.0 23.0 23.0 21.0* 20.0*   BUN mg/dL 14 18 24* 20 26*   CREATININE mg/dL 1.94* 2.41* 3.23* 2.70* 3.61*      Current medications   Scheduled Meds: cefepime, 2,000 mg, Intravenous, Q8H  chlorhexidine, 15 mL, Mouth/Throat, Q12H  insulin lispro, 2-7 Units, Subcutaneous, Q6H  levETIRAcetam, 750 mg, Intravenous, Q12H  metroNIDAZOLE, 500 mg, Intravenous, Q8H  pantoprazole, 40 mg, Intravenous, Q12H  sodium chloride, 10 mL, Intravenous, Q12H  sodium chloride, 10 mL, Intravenous, Q12H  sodium chloride, 10 mL, Intravenous, Q12H      Continuous Infusions: dextrose, 50 mL/hr, Last Rate: 50 mL/hr (11/05/23 0701)  DOBUTamine, 5 mcg/kg/min, Last Rate: 2.5 mcg/kg/min (11/05/23 0805)  norepinephrine, 0.02-0.5 mcg/kg/min, Last Rate: 0.25 mcg/kg/min (11/05/23 0516)  Pharmacy to Dose Cefepime,   phenylephrine, 0.5-6 mcg/kg/min, Last Rate: Stopped (11/04/23 1333)  Phoxillum BK4/2.5, 2,000 mL/hr, Last Rate: 2,000 mL/hr (11/04/23 1449)  Phoxillum BK4/2.5, 2,000 mL/hr, Last Rate: 2,000 mL/hr (11/04/23 1449)  Phoxillum BK4/2.5, 2,000 mL/hr, Last Rate: 2,000 mL/hr (11/04/23 1448)  PrismaSol BGK 2/3.5, 2,000 mL/hr, Last Rate: 2,000 mL/hr (11/05/23 0751)  PrismaSol BGK 2/3.5, 2,000 mL/hr, Last Rate: 2,000 mL/hr (11/05/23 0751)  PrismaSol BGK 2/3.5, 2,000 mL/hr, Last Rate: 2,000 mL/hr (11/05/23 0751)  sodium bicarbonate, 150 mEq, Last Rate: 50 mL/hr at 11/04/23 1648  vasopressin, 0.01-0.1 Units/min, Last Rate: 0.05 Units/min  (11/05/23 2288)        Plan discussed with RN. Reviewed all other data in the last 24 hours, including but not limited to vitals, labs, microbiology, imaging and pertinent notes from other providers.       Victoriano Phillip, DO

## 2023-11-05 NOTE — CONSULTS
Patient Name: Yusuf Jin Account #: [id number]    MRN: [id number] Admission Date: 2023      Consulting Service: Vascular Surgery Date of Evaluation: 2023    Requesting Provider: Victoriano Ocampo DO        [place]      CHIEF COMPLAINT: Right hand ischemia  HPI: Yusuf Jin is a 52 y.o. male is being seen for a consultation and evaluation/management of with moderate right hand ischemia based on clinical exam.  Patient is admitted with a apparent cardiac arrest.  Source of this seems likely a drug overdose but is unclear what drug it may be.  He unfortunately has a severe anoxic injury with an unknown timeline but it appears to be likely unrecoverable.  Despite this plans are being made for trach and PEG tomorrow.  During exam patient was noted to have more duskiness and coolness to the right hand relative to the left.  Patient had a radial art line attempted on 11 /3 on the right side.  The art line failed to work well from the start and was removed the same day.    PAST MEDICAL HISTORY: History reviewed. No pertinent past medical history.   PAST SURGICAL HISTORY: No past surgical history on file.   FAMILY HISTORY: History reviewed. No pertinent family history.   SOCIAL HISTORY:     MEDICATIONS:   No current facility-administered medications on file prior to encounter.     Current Outpatient Medications on File Prior to Encounter   Medication Sig Dispense Refill    methocarbamol (ROBAXIN) 750 MG tablet Take 1 tablet by mouth Every 8 (Eight) Hours As Needed (pain).               ALLERGIES: Patient has no known allergies.   COMPLETE REVIEW OF SYSTEMS:   Review not possible as the patient is intubated and nonresponsive      PHYSICAL EXAM:   Patient Vitals for the past 24 hrs:   BP Temp Temp src Pulse Resp SpO2 Weight   23 1641 117/72 100 °F (37.8 °C) Axillary -- (!) 33 -- --   23 1626 -- -- -- 100 -- 100 % --   23 1152 -- -- -- 118 (!) 30 99 % --   23 0818  -- (!) 100.8 °F (38.2 °C) -- (!) 124 -- 100 % --   11/05/23 0600 -- -- -- (!) 127 -- 100 % --   11/05/23 0545 -- 99.9 °F (37.7 °C) -- (!) 130 -- 100 % --   11/05/23 0532 -- -- -- (!) 132 -- 100 % 80.5 kg (177 lb 7.5 oz)   11/05/23 0530 -- -- -- (!) 130 -- 100 % --   11/05/23 0515 -- (!) 100.9 °F (38.3 °C) -- (!) 129 -- 100 % --   11/05/23 0500 -- -- -- (!) 131 -- 100 % --   11/05/23 0445 -- -- -- (!) 128 -- 100 % --   11/05/23 0430 -- (!) 100.8 °F (38.2 °C) -- (!) 131 -- 100 % --   11/05/23 0420 -- -- -- (!) 131 -- 100 % --   11/05/23 0419 -- -- -- (!) 129 -- 100 % --   11/05/23 0400 -- (!) 100.8 °F (38.2 °C) -- (!) 129 (!) 30 100 % --   11/05/23 0345 -- -- -- (!) 130 -- 100 % --   11/05/23 0330 -- -- -- (!) 127 -- 100 % --   11/05/23 0315 -- -- -- (!) 128 -- 100 % --   11/05/23 0300 -- 99.7 °F (37.6 °C) -- (!) 130 -- 100 % --   11/05/23 0200 -- 98.2 °F (36.8 °C) -- (!) 125 -- 100 % --   11/05/23 0145 -- -- -- (!) 128 -- 100 % --   11/05/23 0130 -- -- -- (!) 125 -- 100 % --   11/05/23 0115 -- -- -- (!) 126 -- 99 % --   11/05/23 0100 -- 99 °F (37.2 °C) -- 120 -- 96 % --   11/05/23 0045 -- -- -- (!) 126 -- 100 % --   11/05/23 0030 -- -- -- (!) 126 -- 100 % --   11/05/23 0015 -- -- -- (!) 130 -- 100 % --   11/05/23 0000 131/76 99 °F (37.2 °C) -- (!) 126 -- 100 % --   11/04/23 2353 -- -- -- (!) 126 (!) 30 100 % --   11/04/23 2352 -- 99 °F (37.2 °C) -- (!) 128 (!) 31 100 % --   11/04/23 2345 -- -- -- (!) 128 -- 100 % --   11/04/23 2330 -- 99 °F (37.2 °C) -- (!) 128 -- 100 % --   11/04/23 2323 107/60 -- -- -- -- -- --   11/04/23 2320 -- -- -- (!) 128 -- 94 % --   11/04/23 2315 -- -- -- (!) 129 -- 100 % --   11/04/23 2310 -- -- -- (!) 127 -- 100 % --   11/04/23 2300 -- 99.5 °F (37.5 °C) -- (!) 128 -- 100 % --   11/04/23 2245 -- -- -- (!) 124 -- 100 % --   11/04/23 2230 -- -- -- (!) 128 -- 98 % --   11/04/23 2215 -- -- -- (!) 129 -- 100 % --   11/04/23 2200 -- 99.5 °F (37.5 °C) -- (!) 129 -- 100 % --   11/04/23 2145 -- --  -- (!) 130 -- 100 % --   11/04/23 2130 -- 99.7 °F (37.6 °C) -- -- -- -- --   11/04/23 2121 -- -- -- (!) 127 -- 100 % --   11/04/23 2107 -- -- -- (!) 129 -- 98 % --   11/04/23 2100 -- -- -- (!) 129 -- 100 % --   11/04/23 2045 -- -- -- (!) 129 -- 100 % --   11/04/23 2025 -- -- -- (!) 128 -- 100 % --   11/04/23 2015 -- -- -- -- -- 100 % --   11/04/23 2007 -- -- -- (!) 128 -- -- --   11/04/23 2000 -- -- -- (!) 128 -- (!) 0 % --   11/04/23 1952 -- 97.6 °F (36.4 °C) Oral -- -- -- --   11/04/23 1945 -- -- -- (!) 125 -- -- --   11/04/23 1930 -- -- -- (!) 127 -- (!) 51 % --   11/04/23 1915 -- -- -- (!) 127 -- 99 % --   11/04/23 1900 109/91 -- -- (!) 127 -- 100 % --   11/04/23 1845 -- -- -- (!) 127 -- 98 % --   11/04/23 1830 -- -- -- (!) 126 -- 99 % --   11/04/23 1800 -- -- -- (!) 127 -- 98 % --        General appearance: uncooperative and intubated unresponsive.  Neurological exam reveals nonresponsive.  ENT exam reveals - ENT exam normal, no neck nodes or sinus tenderness.  CVS exam: normal rate, regular rhythm, normal S1, S2, no murmurs, rubs, clicks or gallops.  Chest: Coarse and bilaterally equal.  Abdominal exam: soft, nontender, nondistended, no masses or organomegaly.  Examination of the feet reveals warm, good capillary refill.  Examination of the upper extremities shows diminished pulses everywhere consistent with vasoconstriction.  Right radial is nonpalpable.  Right hand is mildly cooler than the left with some cyanosis to the index finger and thumb.  The fourth and fifth digits appear to be fairly normal.  No profound ischemia and no evidence of impending tissue loss.      LABS:      Results Review:       I reviewed the patient's new clinical results.  Results from last 7 days   Lab Units 11/05/23  1609 11/05/23  0756 11/05/23  0030 11/04/23  0831 11/04/23  0335 11/03/23  2032 11/03/23  1756 11/03/23  1026 11/03/23  0536 11/03/23  0151 11/02/23  2323   WBC 10*3/mm3 8.10  --  10.70  --  6.40  --   --  2.00* 2.80*   --  7.50   HEMOGLOBIN g/dL 9.6* 10.7* 12.0* 13.1 14.6 14.0 15.5 16.0 16.1  --  13.6   HEMOGLOBIN, POC   --   --   --   --   --   --   --   --   --    < >  --    PLATELETS 10*3/mm3 54*  --  81*  --  133*  --   --  231 242  --  229    < > = values in this interval not displayed.     Results from last 7 days   Lab Units 11/05/23  1202 11/05/23 0756 11/05/23 0030 11/04/23  1549 11/04/23  0831 11/04/23 0335 11/03/23 2032   SODIUM mmol/L 139 137 140 140 141 144 148*   POTASSIUM mmol/L 4.5 4.3 4.8 5.7* 5.6* 4.4 2.8*   CHLORIDE mmol/L 104 103 104 105 106 109* 112*   CO2 mmol/L 25.0 26.0 24.0 23.0 23.0 21.0* 20.0*   BUN mg/dL 18 13 14 18 24* 20 26*   CREATININE mg/dL 2.28* 1.77* 1.94* 2.41* 3.23* 2.70* 3.61*   GLUCOSE mg/dL 88 107* 127* 142* 207* 101* 260*   Estimated Creatinine Clearance: 42.7 mL/min (A) (by C-G formula based on SCr of 2.28 mg/dL (H)).  Results from last 7 days   Lab Units 11/05/23  1202 11/05/23 0756 11/05/23 0030 11/04/23 1549 11/04/23 0831 11/04/23 0335 11/03/23  2032   CALCIUM mg/dL 9.0 9.3 8.7 7.2* 6.9* 7.7* 7.3*   ALBUMIN g/dL 2.8* 2.9* 2.9* 3.1* 2.5* 2.8* 2.6*   MAGNESIUM mg/dL 1.6 1.6 1.6 1.9 1.8 1.4* 1.3*   PHOSPHORUS mg/dL 4.1 3.5 4.3 3.7 2.6 4.0 1.6*     Results from last 7 days   Lab Units 11/05/23  1609 11/03/23  1257   PROTIME Seconds 13.7* 13.0*   INR  1.28* 1.21*       The following radiologic or non-invasive studies have been reviewed by me: I have reviewed the upper extremity venous duplex and the images thereof.    Active Hospital Problems    Diagnosis  POA    **Cardiac arrest with successful resuscitation [I46.9]  Yes    Marijuana abuse [F12.10]  Yes    Alcohol abuse [F10.10]  Yes    Elevated blood sugar [R73.9]  Yes      Resolved Hospital Problems   No resolved problems to display.         ASSESSMENT/PLAN: 52 y.o. male with mild to moderate digital ischemia on the right hand relative to the left.  The patient's situation is complex with severe anoxic brain injury that appears  unrecoverable and unsurvivable.  Despite this family wishes to proceed with trach and PEG.  Right hand viability would be best assessed with arterial testing which we will pursue tomorrow.  I suspect the right radial artery is dissected at the wrist to the mid arm based on duplex scan there is still flow in the proximal radial artery.  The appearance on duplex scan is more consistent with dissection as is the history of an art line not working well.  At this point in time with normal [place] test preprocedure and viable-appearing digits especially in the ulnar region I suspect that the hand will remain viable and that the patient will improve off of pressors.  Heparin would normally be a good option during this time.  But his hemoglobin has fallen rapidly and there is concern about starting anticoagulation.  At this point time we will wait until we have definitive pressure testing to determine whether or not anticoagulation is worth the risk.  I would not recommend attempted thrombectomy as I believe this is a dissection and repair is unlikely to be possible without significant bypass options which are not indicated in this current situation especially with his critical illness and inability to be anticoagulated.  Our team will follow in the morning.      I discussed the plan with the care team who are agreeable to the plan of care at this point. Thank you for this consult.     Regan Yousif MD   11/05/23

## 2023-11-05 NOTE — PROGRESS NOTES
Discussed with nurse.  On relatively low vent settings.  Will discuss goals of care with patient's daughter, POA, and if agreeable we will plan for tracheostomy and PEG tomorrow morning.  N.p.o. after midnight, hold anticoagulation tomorrow.

## 2023-11-05 NOTE — PROGRESS NOTES
LOS: 2 days     Chief Complaint: Cardiopulmonary arrest       SUBJECTIVE:    History taken from: chart RN    Interval History: Yusuf Jin was admitted on 11/2/2023 and I been seeing him since then    He has remained in coma, Dianna Coma Scale 3T    Today staff reported some respiratory effort  Usually not enough to sustain independent unassisted ventilation  But with some yawn and occasional breath, clinically makes him not brain-dead    His MRI shows diffuse anoxic damage versus what was suspected    Family's decision is trach and PEG and that is being arranged and then long-term facility    Family not available now and actually the daughter made the decision and other family members are questioning but that is the decision    - Portions of the above HPI were copied from previous encounters and edited as appropriate.    Patient Complaints: Not possible      Review of Systems   Not possible    Pertinent PMH:  has no past medical history on file.   ________________________________________________     OBJECTIVE:  Intubated    Neurologic Exam    Comatose exam  PHYSICAL EXAM:    Constitutional: The patient is in no apparent distress, intubated on ventilator without sedation. Comatose.     Head: Normocephalic, atraumatic.     Chest: Intubated on ventilator    Cardiac: Regular rate and rhythm.     Extremities:  No clubbing, cyanosis. Mild edema all extremities    NEUROLOGICAL:    Cognition:   Comatose, no sedation  No response    Cranial nerves;  0/3 eye signs, pupils fixed at 5mm  no apparent facial asymmetry     Sensory:  No response to painful stimuli    Motor: No spontaneous movements, no withdraw to painful stimuli    Cerebellar and gait not able to be tested as pt is comatose.      ________________________________________________   RESULTS REVIEW    VITAL SIGNS:  Temp:  [97.2 °F (36.2 °C)-100.9 °F (38.3 °C)] 100.8 °F (38.2 °C)  Heart Rate:  [118-132] 118  Resp:  [30-31] 30  BP: ()/(59-91)  131/76  FiO2 (%):  [40 %-60 %] 60 %    LABS:       Lab 11/05/23  0756 11/05/23  0030 11/04/23  0831 11/04/23  0335 11/03/23 2032 11/03/23  1756 11/03/23  1257 11/03/23  1026 11/03/23  0536 11/03/23  0359 11/03/23  0231 11/03/23  0151 11/02/23  2345 11/02/23  2323   WBC  --  10.70  --  6.40  --   --   --  2.00* 2.80*  --   --   --   --  7.50   HEMOGLOBIN 10.7* 12.0* 13.1 14.6 14.0   < >  --  16.0 16.1  --   --   --   --  13.6   HEMOGLOBIN, POC  --   --   --   --   --   --   --   --   --  15.1  --  14.6  --   --    HEMATOCRIT 31.1* 34.8* 38.6 42.7 41.9   < >  --  47.8 47.6  --   --   --   --  40.7   HEMATOCRIT POC  --   --   --   --   --   --   --   --   --  44  --  43  --   --    PLATELETS  --  81*  --  133*  --   --   --  231 242  --   --   --   --  229   NEUTROS ABS  --  8.88*  --  5.06  --   --   --   --  1.40*  --   --   --   --  2.40   LYMPHS ABS  --   --   --   --   --   --   --   --  1.20  --   --   --   --   --    MONOS ABS  --   --   --   --   --   --   --   --  0.10  --   --   --   --   --    EOS ABS  --   --   --  0.06  --   --   --   --  0.00  --   --   --   --   --    MCV  --  95.6  --  95.4  --   --   --  97.7* 97.3*  --   --   --   --  99.0*   LACTATE  --   --   --   --   --   --   --  7.7*  --  6.9* 6.7* 7.1* 7.7*  --    PROTIME  --   --   --   --   --   --  13.0*  --   --   --   --   --   --   --    APTT  --   --   --   --   --   --  25.6  --   --   --   --   --   --   --     < > = values in this interval not displayed.         Lab 11/05/23  1202 11/05/23  0756 11/05/23  0030 11/04/23  1549 11/04/23  0831 11/03/23  1026 11/03/23  0536   SODIUM 139 137 140 140 141   < > 147*   POTASSIUM 4.5 4.3 4.8 5.7* 5.6*   < > 3.1*   CHLORIDE 104 103 104 105 106   < > 113*   CO2 25.0 26.0 24.0 23.0 23.0   < > 17.0*   ANION GAP 10.0 8.0 12.0 12.0 12.0   < > 17.0*   BUN 18 13 14 18 24*   < > 14   CREATININE 2.28* 1.77* 1.94* 2.41* 3.23*   < > 2.15*   EGFR 33.5* 45.4* 40.6* 31.3* 22.0*   < > 35.9*   GLUCOSE 88 107*  127* 142* 207*   < > 55*   CALCIUM 9.0 9.3 8.7 7.2* 6.9*   < > 8.3*   IONIZED CALCIUM 1.18* 1.25 1.17* 0.94* 0.94*   < >  --    MAGNESIUM 1.6 1.6 1.6 1.9 1.8   < > 1.9   PHOSPHORUS 4.1 3.5 4.3 3.7 2.6   < > 4.0   HEMOGLOBIN A1C  --   --   --   --   --   --  5.10   TSH  --   --   --   --   --   --  1.920    < > = values in this interval not displayed.         Lab 11/05/23  1202 11/05/23  0756 11/05/23  0030 11/04/23  1549 11/04/23  0831 11/04/23  0335 11/03/23  2032 11/03/23  0536 11/02/23  2323   TOTAL PROTEIN  --   --  5.4*  --   --  5.1*  --  5.5* 6.3   ALBUMIN 2.8* 2.9* 2.9* 3.1* 2.5* 2.8*   < > 3.5 3.9   GLOBULIN  --   --  2.5  --   --  2.3  --  2.0 2.4   ALT (SGPT)  --   --  385*  --   --  511*  --  163* 93*   AST (SGOT)  --   --  632*  --   --  1,287*  --  433* 154*   BILIRUBIN  --   --  1.0  --   --  0.7  --  0.4 0.2   ALK PHOS  --   --  45  --   --  43  --  79 65    < > = values in this interval not displayed.         Lab 11/03/23  1257 11/03/23  0231 11/02/23  2323   PROBNP  --   --  <36.0   HSTROP T  --  298* 10   PROTIME 13.0*  --   --    INR 1.21*  --   --          Lab 11/03/23  0536   CHOLESTEROL 192   LDL CHOL 116*   HDL CHOL 60   TRIGLYCERIDES 90         Lab 11/03/23  1026   ABO TYPING O   RH TYPING Positive         Lab 11/05/23  0518 11/04/23  0417 11/03/23  1718   PH, ARTERIAL 7.427 7.377 7.279*   PCO2, ARTERIAL 37.1 35.0 42.7   PO2 .9* 226.6* 376.7*   O2 SATURATION ART 99.7* 99.8* 99.9*   FIO2 45 70 100   HCO3 ART 24.5 20.6* 20.0*   BASE EXCESS ART 0.3 -3.9* -6.6*     UA          11/2/2023    23:33   Urinalysis   Squamous Epithelial Cells, UA 0-2    Specific Gravity, UA <=1.005    Ketones, UA Negative    Blood, UA Trace    Leukocytes, UA Negative    Nitrite, UA Negative    RBC, UA None Seen    WBC, UA 0-2    Bacteria, UA None Seen        Lab Results   Component Value Date    TSH 1.920 11/03/2023     (H) 11/03/2023    HGBA1C 5.10 11/03/2023         IMAGING STUDIES:  CT Abdomen Pelvis  Without Contrast    Result Date: 11/5/2023  Impression: 1. Nonspecific fluid and mixed liquid stool throughout the abdomen without signs of overt obstruction or active inflammation. Correlate for diarrheal illness. 2. Increasing patchy airspace opacities in the lower lobes suspicious for pneumonia versus aspiration. 3. Retained contrast in the right kidney suggesting acute tubular necrosis. No hydronephrosis. 4. Findings of mild volume overload/third spacing including body wall edema, presacral edema and trace ascites. No drainable collection. 5. Other ancillary findings as above. Electronically Signed: Jermiane Heath MD  11/5/2023 11:15 AM EST  Workstation ID: [id number]    MRI Brain Without Contrast    Result Date: 11/5/2023  Impression: MRI findings suggesting diffuse anoxic brain injury. Electronically Signed: Jermaine Heath MD  11/5/2023 10:57 AM EST  Workstation ID: [id number]    XR Chest 1 View    Result Date: 11/5/2023  Impression: 1. Improving vascular congestion and pulmonary edema. 2. Support devices as above, including high ET tube 8.7 cm above andry similar in position to prior exam. Electronically Signed: Jermaine Heath MD  11/5/2023 8:33 AM EST  Workstation ID: [id number]    XR Chest 1 View    Result Date: 11/4/2023  Impression: Appropriately placed right internal jugular CVC catheter with the tip in the mid SVC. No pneumothorax. Mild pulmonary edema pattern, improved as compared to the previous study. Electronically Signed: Caitlyn Urbina MD  11/4/2023 12:54 AM EDT  Workstation ID: [id number]    XR Chest 1 View    Result Date: 11/3/2023  Impression: Unchanged position of right PICC. Right subclavian dual-lumen venous catheter with the distal tip at the upper to mid SVC. Adequate position of endotracheal and enteric tube. Mildly improved patchy opacities throughout the lungs which may represent true improvement versus technical differences Electronically Signed: Ellis Gibson DO  11/3/2023 5:07 PM EDT   Workstation ID: [id number]     I reviewed the patient's new clinical results.    ________________________________________________      PROBLEM LIST:    Cardiac arrest with successful resuscitation    Marijuana abuse    Alcohol abuse    Elevated blood sugar        ASSESSMENT/PLAN:  Cardiopulmonary rest with anoxic encephalopathy both clinically and MRI confirmed    Brain death not determined yet    Family do not want to wait and want to go for trach and PEG    **Please refer to previous notes for further details and recommendations.     I discussed the patient's findings and my recommendations with nursing staff    Chacho Sher MD  11/05/23  14:20 EST

## 2023-11-05 NOTE — PROGRESS NOTES
Nutrition Services    Patient Name: Yusuf Jin  YOB: 1970  MRN: [id number]  Admission date: 11/2/2023    PPE Documentation        PPE Worn By Provider Did not enter room for this encounter   PPE Worn By Patient  N/A     PROGRESS NOTE      Encounter Information: Checking in on pt's hemodynamic instability. Noted MAP improved. Pt remains intubated. CRRT continues. MRI brain pending, EDOUARD Islas noted very little chance of meaningful recovery. EDOUARD Islas also wanting to hold off on TF due to pt being critically ill and unstable as well as firm abdomen without bowel sounds.     Addendum 1343: Family wanting to proceed with trach and PEG. Will plan to initiate TF after PEG placed.        PO Diet: NPO Diet NPO Type: Strict NPO   PO Supplements: -   PO Intake:  -       Current nutrition support: RD to manage   Nutrition support review: No TF infusion at this time       Labs (reviewed below): Reviewed. Management per attending.         GI Function:  Liquid BM 11/5       Nutrition Intervention Updates: Monitor for appropriateness to initiate TF + POC/GOC.        Results from last 7 days   Lab Units 11/05/23  0756 11/05/23  0030 11/04/23  1549 11/04/23  0831 11/04/23  0335 11/03/23  1026 11/03/23  0536   SODIUM mmol/L 137 140 140   < > 144   < > 147*   POTASSIUM mmol/L 4.3 4.8 5.7*   < > 4.4   < > 3.1*   CHLORIDE mmol/L 103 104 105   < > 109*   < > 113*   CO2 mmol/L 26.0 24.0 23.0   < > 21.0*   < > 17.0*   BUN mg/dL 13 14 18   < > 20   < > 14   CREATININE mg/dL 1.77* 1.94* 2.41*   < > 2.70*   < > 2.15*   CALCIUM mg/dL 9.3 8.7 7.2*   < > 7.7*   < > 8.3*   BILIRUBIN mg/dL  --  1.0  --   --  0.7  --  0.4   ALK PHOS U/L  --  45  --   --  43  --  79   ALT (SGPT) U/L  --  385*  --   --  511*  --  163*   AST (SGOT) U/L  --  632*  --   --  1,287*  --  433*   GLUCOSE mg/dL 107* 127* 142*   < > 101*   < > 55*    < > = values in this interval not displayed.     Results from last 7 days   Lab Units 11/05/23  0753  11/05/23  0030 11/04/23  1549 11/03/23  1026 11/03/23  0536   MAGNESIUM mg/dL 1.6 1.6 1.9   < > 1.9   PHOSPHORUS mg/dL 3.5 4.3 3.7   < > 4.0   HEMOGLOBIN g/dL 10.7* 12.0*  --    < > 16.1   HEMATOCRIT % 31.1* 34.8*  --    < > 47.6   TRIGLYCERIDES mg/dL  --   --   --   --  90    < > = values in this interval not displayed.     COVID19   Date Value Ref Range Status   11/03/2023 Not Detected Not Detected - Ref. Range Final     Lab Results   Component Value Date    HGBA1C 5.10 11/03/2023       RD to follow up per protocol.    Electronically signed by:  Mayte Mccullough RD  11/05/23 11:34 EST

## 2023-11-05 NOTE — PROGRESS NOTES
Nephrology  Progress Note                                        [facility]    Patient Identification    Name: Yusuf Jin  Age: 52 y.o.  Sex: male  :  1970  MRN: [id number]      DATE OF SERVICE:  2023        Subective    Off CRRT  Vent  Supine  Pressors now only 2   Fio2 60%     Objective   Scheduled Meds:cefepime, 2,000 mg, Intravenous, Q8H  chlorhexidine, 15 mL, Mouth/Throat, Q12H  insulin lispro, 2-7 Units, Subcutaneous, Q6H  levETIRAcetam, 750 mg, Intravenous, Q12H  metoprolol tartrate, 25 mg, Nasogastric, Q12H  metroNIDAZOLE, 500 mg, Intravenous, Q8H  pantoprazole, 40 mg, Intravenous, Q12H  sodium chloride, 10 mL, Intravenous, Q12H  sodium chloride, 10 mL, Intravenous, Q12H  sodium chloride, 10 mL, Intravenous, Q12H          Continuous Infusions:dextrose, 50 mL/hr, Last Rate: 50 mL/hr (23 0701)  DOBUTamine, 5 mcg/kg/min, Last Rate: 2.5 mcg/kg/min (23 0805)  norepinephrine, 0.02-0.5 mcg/kg/min, Last Rate: 0.25 mcg/kg/min (23 0516)  Pharmacy to Dose Cefepime,   phenylephrine, 0.5-6 mcg/kg/min, Last Rate: Stopped (23 1333)  Phoxillum BK4/2.5, 2,000 mL/hr, Last Rate: 2,000 mL/hr (23 144)  Phoxillum BK4/2.5, 2,000 mL/hr, Last Rate: 2,000 mL/hr (23 1449)  Phoxillum BK4/2.5, 2,000 mL/hr, Last Rate: 2,000 mL/hr (23 1448)  PrismaSol BGK 2/3.5, 2,000 mL/hr, Last Rate: 2,000 mL/hr (23 075)  PrismaSol BGK 2/3.5, 2,000 mL/hr, Last Rate: 2,000 mL/hr (23 0751)  PrismaSol BGK 2/3.5, 2,000 mL/hr, Last Rate: 2,000 mL/hr (23 0751)  sodium bicarbonate, 150 mEq, Last Rate: 50 mL/hr at 23 1648  vasopressin, 0.01-0.1 Units/min, Last Rate: 0.05 Units/min (23 0820)        PRN Meds:  acetaminophen **OR** acetaminophen    artificial tears    senna-docusate sodium **AND** polyethylene glycol  "**AND** bisacodyl **AND** bisacodyl    Calcium Replacement - Follow Nurse / BPA Driven Protocol    dextrose    dextrose    fentaNYL citrate (PF)    glucagon (human recombinant)    heparin (porcine)    ipratropium-albuterol    Magnesium Standard Dose Replacement - Follow Nurse / BPA Driven Protocol    nitroglycerin    ondansetron **OR** ondansetron    Pharmacy to Dose Cefepime    Phosphorus Replacement - Follow Nurse / BPA Driven Protocol    Potassium Replacement - Follow Nurse / BPA Driven Protocol    sodium chloride    sodium chloride    sodium chloride    sodium chloride    sodium chloride    sodium chloride    sodium chloride     Exam:  /76   Pulse (!) 124   Temp (!) 100.8 °F (38.2 °C)   Resp (!) 30   Ht 175.3 cm (69\")   Wt 80.5 kg (177 lb 7.5 oz)   SpO2 100%   BMI 26.21 kg/m²     Intake/Output last 3 shifts:  I/O last 3 completed shifts:  In: 8781 [I.V.:8548; Blood:233]  Out: 7595 [Urine:313; Emesis/NG output:600; Stool:2650]    Intake/Output this shift:  I/O this shift:  In: 177 [I.V.:177]  Out: 173     Physical exam:  General Appearance:  vent presors CRRT  Head:  Normocephalic, without obvious abnormality, atraumatic  Eyes: conjunctiva/corneas clear     Neck:  Supple,  no adenopathy;      Lungs:  Decreased BS occasion ronchi  Heart:  Regular rate and rhythm, S1 and S2 normal  Abdomen:  Soft, non-tender, bowel sounds active   Extremities: trace edema  Pulses: 2+ and symmetric all extremities  Skin:  No rashes or lesions       Data Review:  All labs (24hrs):   Recent Results (from the past 24 hour(s))   Adult Transthoracic Echo Complete W/ Cont if Necessary Per Protocol    Collection Time: 11/04/23 10:02 AM   Result Value Ref Range    LVIDd 4.0 cm    LVIDs 3.4 cm    IVSd 0.70 cm    LVPWd 0.80 cm    FS 15.0 %    IVS/LVPW 0.88 cm    LV Sys Vol (BSA corrected) 25.3 cm2    EDV(cubed) 64.0 ml    LV Orellana Vol (BSA corrected) 32.6 cm2    LV mass(C)d 85.8 grams    LVOT area 3.1 cm2    LVOT diam 2.00 cm    " EDV(MOD-sp4) 62.1 ml    ESV(MOD-sp4) 48.1 ml    SV(MOD-sp4) 14.0 ml    SI(MOD-sp4) 7.4 ml/m2    EF(MOD-sp4) 22.5 %    MV E max jc 44.5 cm/sec    MV A max jc 39.3 cm/sec    MV dec time 0.09 sec    MV E/A 1.13     Pulm A Revs Dur 0.09 sec    MV A dur 0.10 sec    Med Peak E' Jc 6.7 cm/sec    Lat Peak E' Jc 8.9 cm/sec    Avg E/e' ratio 5.71     SV(LVOT) 30.0 ml    RV Base 2.30 cm    RV Mid 1.90 cm    RV Length 5.7 cm    TAPSE (>1.6) 1.19 cm    RV S' 8.8 cm/sec    LA dimension (2D)  1.70 cm    Pulm Sys Cj 30.7 cm/sec    Pulm Orellana Jc 27.9 cm/sec    Pulm S/D 1.10     Pulm A Revs Jc 27.0 cm/sec    LV V1 max 68.7 cm/sec    LV V1 max PG 1.89 mmHg    LV V1 mean PG 1.00 mmHg    LV V1 VTI 9.6 cm    Ao pk jc 102.0 cm/sec    Ao max PG 4.2 mmHg    Ao mean PG 2.00 mmHg    Ao V2 VTI 11.3 cm    LISBETH(I,D) 2.7 cm2    MV max PG 1.07 mmHg    MV mean PG 0.00 mmHg    MV V2 VTI 8.9 cm    MV P1/2t 24.7 msec    MVA(P1/2t) 8.9 cm2    MVA(VTI) 3.4 cm2    MV dec slope 475.0 cm/sec2    MR max jc 132.0 cm/sec    MR max PG 7.0 mmHg    TR max jc 206.0 cm/sec    TR max PG 17.0 mmHg    PA V2 max 72.1 cm/sec    Ao root diam 3.4 cm    ACS 1.70 cm    EF(MOD-bp) 23.0 %    RVSP(TR) 20 mmHg    RAP systole 3 mmHg   POC Glucose Once    Collection Time: 11/04/23 11:51 AM    Specimen: Blood   Result Value Ref Range    Glucose 67 (L) 70 - 105 mg/dL   POC Glucose Once    Collection Time: 11/04/23  1:50 PM    Specimen: Blood   Result Value Ref Range    Glucose 49 (C) 70 - 105 mg/dL   Prepare Cryoprecipitate 5 Pack, 2 Units    Collection Time: 11/04/23  2:00 PM   Result Value Ref Range    Product Code F6237X68     Unit Number [id number]     UNIT  ABO A     UNIT  RH POS     Dispense Status PT     Blood Expiration Date [id number]     Blood Type Barcode      Product Code A1527M56     Unit Number [id number]     UNIT  ABO A     UNIT  RH POS     Dispense Status PT     Blood Expiration Date [id number]     Blood Type Barcode 6200    Fibrinogen     Collection Time: 11/04/23  2:25 PM    Specimen: Blood   Result Value Ref Range    Fibrinogen 451 (H) 210 - 450 mg/dL   POC Glucose Once    Collection Time: 11/04/23  3:43 PM    Specimen: Blood   Result Value Ref Range    Glucose 20 (C) 70 - 105 mg/dL   POC Glucose Once    Collection Time: 11/04/23  3:45 PM    Specimen: Blood   Result Value Ref Range    Glucose 139 (H) 70 - 105 mg/dL   Renal Function Panel    Collection Time: 11/04/23  3:49 PM    Specimen: Blood   Result Value Ref Range    Glucose 142 (H) 65 - 99 mg/dL    BUN 18 6 - 20 mg/dL    Creatinine 2.41 (H) 0.76 - 1.27 mg/dL    Sodium 140 136 - 145 mmol/L    Potassium 5.7 (H) 3.5 - 5.2 mmol/L    Chloride 105 98 - 107 mmol/L    CO2 23.0 22.0 - 29.0 mmol/L    Calcium 7.2 (L) 8.6 - 10.5 mg/dL    Albumin 3.1 (L) 3.5 - 5.2 g/dL    Phosphorus 3.7 2.5 - 4.5 mg/dL    Anion Gap 12.0 5.0 - 15.0 mmol/L    BUN/Creatinine Ratio 7.5 7.0 - 25.0    eGFR 31.3 (L) >60.0 mL/min/1.73   Magnesium    Collection Time: 11/04/23  3:49 PM    Specimen: Blood   Result Value Ref Range    Magnesium 1.9 1.6 - 2.6 mg/dL   Calcium, Ionized    Collection Time: 11/04/23  3:49 PM    Specimen: Blood   Result Value Ref Range    Ionized Calcium 0.94 (L) 1.20 - 1.30 mmol/L   POC Glucose Once    Collection Time: 11/04/23  5:55 PM    Specimen: Blood   Result Value Ref Range    Glucose 141 (H) 70 - 105 mg/dL   POC Glucose Once    Collection Time: 11/05/23 12:29 AM    Specimen: Blood   Result Value Ref Range    Glucose 120 (H) 70 - 105 mg/dL   Magnesium    Collection Time: 11/05/23 12:30 AM    Specimen: Blood   Result Value Ref Range    Magnesium 1.6 1.6 - 2.6 mg/dL   Calcium, Ionized    Collection Time: 11/05/23 12:30 AM    Specimen: Blood   Result Value Ref Range    Ionized Calcium 1.17 (L) 1.20 - 1.30 mmol/L   Comprehensive Metabolic Panel    Collection Time: 11/05/23 12:30 AM    Specimen: Blood   Result Value Ref Range    Glucose 127 (H) 65 - 99 mg/dL    BUN 14 6 - 20 mg/dL    Creatinine 1.94 (H)  0.76 - 1.27 mg/dL    Sodium 140 136 - 145 mmol/L    Potassium 4.8 3.5 - 5.2 mmol/L    Chloride 104 98 - 107 mmol/L    CO2 24.0 22.0 - 29.0 mmol/L    Calcium 8.7 8.6 - 10.5 mg/dL    Total Protein 5.4 (L) 6.0 - 8.5 g/dL    Albumin 2.9 (L) 3.5 - 5.2 g/dL    ALT (SGPT) 385 (H) 1 - 41 U/L    AST (SGOT) 632 (H) 1 - 40 U/L    Alkaline Phosphatase 45 39 - 117 U/L    Total Bilirubin 1.0 0.0 - 1.2 mg/dL    Globulin 2.5 gm/dL    A/G Ratio 1.2 g/dL    BUN/Creatinine Ratio 7.2 7.0 - 25.0    Anion Gap 12.0 5.0 - 15.0 mmol/L    eGFR 40.6 (L) >60.0 mL/min/1.73   CBC Auto Differential    Collection Time: 11/05/23 12:30 AM    Specimen: Blood   Result Value Ref Range    WBC 10.70 3.40 - 10.80 10*3/mm3    RBC 3.64 (L) 4.14 - 5.80 10*6/mm3    Hemoglobin 12.0 (L) 13.0 - 17.7 g/dL    Hematocrit 34.8 (L) 37.5 - 51.0 %    MCV 95.6 79.0 - 97.0 fL    MCH 33.0 26.6 - 33.0 pg    MCHC 34.5 31.5 - 35.7 g/dL    RDW 13.3 12.3 - 15.4 %    RDW-SD 43.8 37.0 - 54.0 fl    MPV 8.1 6.0 - 12.0 fL    Platelets 81 (L) 140 - 450 10*3/mm3   Scan Slide    Collection Time: 11/05/23 12:30 AM    Specimen: Blood   Result Value Ref Range    Scan Slide     Phosphorus    Collection Time: 11/05/23 12:30 AM    Specimen: Blood   Result Value Ref Range    Phosphorus 4.3 2.5 - 4.5 mg/dL   Manual Differential    Collection Time: 11/05/23 12:30 AM    Specimen: Blood   Result Value Ref Range    Neutrophil % 50.0 42.7 - 76.0 %    Lymphocyte % 8.0 (L) 19.6 - 45.3 %    Monocyte % 2.0 (L) 5.0 - 12.0 %    Bands %  33.0 (H) 0.0 - 5.0 %    Metamyelocyte % 4.0 (H) 0.0 - 0.0 %    Myelocyte % 3.0 (H) 0.0 - 0.0 %    Neutrophils Absolute 8.88 (H) 1.70 - 7.00 10*3/mm3    Lymphocytes Absolute 0.86 0.70 - 3.10 10*3/mm3    Monocytes Absolute 0.21 0.10 - 0.90 10*3/mm3    RBC Morphology Normal Normal    Toxic Granulation Slight/1+ None Seen    Vacuolated Neutrophils Slight/1+ None Seen    Platelet Estimate Decreased Normal    Large Platelets Slight/1+ None Seen   Blood Gas, Arterial -     Collection Time: 11/05/23  5:18 AM    Specimen: Arterial Blood   Result Value Ref Range    Site Arterial Line     Glenroy's Test N/A     pH, Arterial 7.427 7.350 - 7.450 pH units    pCO2, Arterial 37.1 35.0 - 48.0 mm Hg    pO2, Arterial 190.9 (H) 83.0 - 108.0 mm Hg    HCO3, Arterial 24.5 21.0 - 28.0 mmol/L    Base Excess, Arterial 0.3 0.0 - 3.0 mmol/L    O2 Saturation, Arterial 99.7 (H) 94.0 - 98.0 %    CO2 Content 25.6 22 - 29 mmol/L    Barometric Pressure for Blood Gas      Modality Adult Vent     FIO2 45 %    Ventilator Mode PC     PEEP 8     PIP 25 cmH2O    Hemodilution No     Respiratory Rate 30     Inspiratory Time 1    POC Glucose Once    Collection Time: 11/05/23  5:41 AM    Specimen: Blood   Result Value Ref Range    Glucose 102 70 - 105 mg/dL   Renal Function Panel    Collection Time: 11/05/23  7:56 AM    Specimen: Blood   Result Value Ref Range    Glucose 107 (H) 65 - 99 mg/dL    BUN 13 6 - 20 mg/dL    Creatinine 1.77 (H) 0.76 - 1.27 mg/dL    Sodium 137 136 - 145 mmol/L    Potassium 4.3 3.5 - 5.2 mmol/L    Chloride 103 98 - 107 mmol/L    CO2 26.0 22.0 - 29.0 mmol/L    Calcium 9.3 8.6 - 10.5 mg/dL    Albumin 2.9 (L) 3.5 - 5.2 g/dL    Phosphorus 3.5 2.5 - 4.5 mg/dL    Anion Gap 8.0 5.0 - 15.0 mmol/L    BUN/Creatinine Ratio 7.3 7.0 - 25.0    eGFR 45.4 (L) >60.0 mL/min/1.73   Magnesium    Collection Time: 11/05/23  7:56 AM    Specimen: Blood   Result Value Ref Range    Magnesium 1.6 1.6 - 2.6 mg/dL   Calcium, Ionized    Collection Time: 11/05/23  7:56 AM    Specimen: Blood   Result Value Ref Range    Ionized Calcium 1.25 1.20 - 1.30 mmol/L   Hemoglobin & Hematocrit, Blood    Collection Time: 11/05/23  7:56 AM    Specimen: Blood   Result Value Ref Range    Hemoglobin 10.7 (L) 13.0 - 17.7 g/dL    Hematocrit 31.1 (L) 37.5 - 51.0 %          Imaging:  XR Chest 1 View    Result Date: 11/4/2023  Impression: Appropriately placed right internal jugular CVC catheter with the tip in the mid SVC. No pneumothorax. Mild  pulmonary edema pattern, improved as compared to the previous study. Electronically Signed: Caitlyn Urbina MD  11/4/2023 12:54 AM EDT  Workstation ID: [id number]    XR Chest 1 View    Result Date: 11/3/2023  Impression: Unchanged position of right PICC. Right subclavian dual-lumen venous catheter with the distal tip at the upper to mid SVC. Adequate position of endotracheal and enteric tube. Mildly improved patchy opacities throughout the lungs which may represent true improvement versus technical differences Electronically Signed: Ellis Gibson DO  11/3/2023 5:07 PM EDT  Workstation ID: [id number]    XR Abdomen KUB    Result Date: 11/3/2023  Impression: Feeding tube terminates in the stomach. Electronically Signed: Grey Newton MD  11/3/2023 3:21 AM EDT  Workstation ID: [id number]    XR Chest 1 View    Result Date: 11/3/2023  Impression: 1.Worsening diffuse bilateral airspace disease. 2.Endotracheal tube tip 4.6 cm above the andry. 3.Right upper extremity PICC terminates in the right brachiocephalic vein. Electronically Signed: Grey Newton MD  11/3/2023 3:21 AM EDT  Workstation ID: [id number]    CT Angiogram Chest Pulmonary Embolism    Result Date: 11/3/2023  Impression: 1.Multifocal pneumonia. 2.No evidence of pulmonary embolism. Electronically Signed: Grey Newton MD  11/3/2023 12:22 AM EDT  Workstation ID: [id number]    CT Head Without Contrast    Result Date: 11/3/2023  Impression: 1.Gray-white differentiation is present, but poor throughout the brain and cerebellum. This could be related to anoxic brain injury. This could be further evaluated with follow-up brain MRI or serial CT as indicated. 2.No acute intracranial hemorrhage. Electronically Signed: Grey Newton MD  11/3/2023 12:20 AM EDT  Workstation ID: [id number]    XR Chest 1 View    Result Date: 11/2/2023  Impression: 1.ET tube tip 5.3 cm above the andry. 2.Right apical airspace disease. Electronically Signed: Grey Newton MD  11/2/2023 11:37 PM EDT   Workstation ID: [id number]     Assessment/Plan:     Cardiac arrest with successful resuscitation    Marijuana abuse    Alcohol abuse    Elevated blood sugar       Acute kidney injury   Severe metabolic/ respiratory acidosis   Cardiopulmonary arrest   ARDS   Possible anoxic brain injury   Hyperglycemia   Elevated troponin   Hypokalemia   Hypernatremia   Lactic acidosis  Recommendations:  Off crrt for Cts  Repeat labs noted  No need for CRRT now  ? HD in am  Recheck labs 8 pm    pt is anuric  Correct electrolytes  Pt is still critical   32 min  D/w family and intensivist

## 2023-11-06 NOTE — ANESTHESIA PREPROCEDURE EVALUATION
Anesthesia Evaluation     Patient summary reviewed and Nursing notes reviewed   no history of anesthetic complications:   NPO Solid Status: > 8 hours  NPO Liquid Status: > 8 hours           Airway   Mallampati: unable to access  TM distance: >3 FB  Neck ROM: full  Dental      Pulmonary    (+) pneumonia stable ,rhonchi  Cardiovascular     Rhythm: regular  Rate: normal    (+) PVD      Neuro/Psych- negative ROS  GI/Hepatic/Renal/Endo    (+) renal disease- dialysis    Musculoskeletal     Abdominal  - normal exam   Substance History   (+) alcohol use, drug use     OB/GYN          Other        ROS/Med Hx Other: Unrecoverable anoxic brain injury  Septic shock  Cardiogenic shock  Multisystem organ failure        Phys Exam Other: Pt intubated and sedated. Anoxic brain injury due to substance abuse            Anesthesia Plan    ASA 4     general     intravenous induction   Postoperative Plan: Expected vent after surgery  Plan/risks discussed with: no one currently at bedside.    Plan discussed with CRNA and CAA.    CODE STATUS:    Level Of Support Discussed With: Next of Kin (If No Surrogate)  Code Status (Patient has no pulse and is not breathing): CPR (Attempt to Resuscitate)  Medical Interventions (Patient has pulse or is breathing): Full Support

## 2023-11-06 NOTE — DISCHARGE PLACEMENT REQUEST
"Yusuf Jin (52 y.o. Male)       Date of Birth   1970    Social Security Number       Address   [address] [place] [facility]    Home Phone   [phone]    MRN   [id number]       Jainism   Unknown    Marital Status   Unknown                            Admission Date   23    Admission Type   Emergency    Admitting Provider   Victoriano Phillip DO    Attending Provider   Victoriano Phillip DO    Department, Room/Bed   [facility],        Discharge Date       Discharge Disposition       Discharge Destination                                 Attending Provider: Victoriano Phillip DO    Allergies: No Known Allergies    Isolation: None   Infection: None   Code Status: CPR    Ht: 175.3 cm (69\")   Wt: 82.8 kg (182 lb 8.7 oz)    Admission Cmt: None   Principal Problem: Cardiac arrest with successful resuscitation [I46.9]                   Active Insurance as of 2023       Primary Coverage       Payor Plan Insurance Group Employer/Plan Group    [employer]HE[employer] MEDICAID [place] MEDICAID [id number]       Payor Plan Address Payor Plan Phone Number Payor Plan Fax Number Effective Dates    [address] [address] [phone]  2019 - None Entered    [facility] [id number]         Subscriber Name Subscriber Birth Date Member ID       YUSUF JIN 1970 [id number]                     Emergency Contacts        (Rel.) Home Phone Work Phone Mobile Phone    Tamiko Jin (Daughter) -- -- [phone]    Yusuf Interiano (Son) -- -- [phone]    Jin,Marie (Mother) -- -- [phone]    Bree Riggins (Sister) -- -- [phone]                 History & Physical        Arely Sarmiento APRN at 23 0149       Attestation signed by Victoriano Phillip DO at 23 0815    I have reviewed this documentation and agree.                    Critical Care History and Physical     Yusuf Jin : 1970 " MRN:[id number] LOS:0 ROOM: [facility]     Reason for admission: Cardiac arrest with successful resuscitation     Assessment / Plan     Cardiopulmonary arrest with successful resuscitation  -No TTM due to anoxic injury on initial CT scan  -Monitor and trend labs as appropriate  -Mechanical ventilation support and management  -Vasopressor support  -Pan culture    Elevated random blood sugar  -A1C  -Start SSI  -Accu-checks Q 6 hours        Code Status (Patient has no pulse and is not breathing): CPR (Attempt to Resuscitate)  Medical Interventions (Patient has pulse or is breathing): Full Support       Nutrition:   NPO Diet NPO Type: Strict NPO     DVT prophylaxis:  Medical and mechanical DVT prophylaxis orders are present.     History of Present illness     Yusuf Jin is a 52 y.o. male with no reported PMH presented to the hospital for cardiac arrest, and was admitted with a principal diagnosis of Cardiac arrest with successful resuscitation.  HPI information is limited due to patient intubated and unresponsive; information obtained from ER report and chart review.  Per reported history at this time patient's sister stated he had been drinking excessively and went to smoke some marijuana.  She states the marijuana looked and smelled different than any she never seen before and after he fell to the floor she called 911.  After her brother was taken to the hospital she performed a fentanyl test kit on the marijuana and it tested 100% for fentanyl.  Patient was brought to the intensive care unit for further evaluation and treatment.      ACP: Patient will remain full code with full intervention; attempting to determine next of kin.       Patient was seen and examined on 11/03/23 at 01:49 EDT .    Subjective / Review of systems     Review of Systems   Unable to perform ROS: Acuity of condition        Past Medical/Surgical/Social/Family History & Allergies     No past medical history on file.   No past surgical history  on file.   Social History     Socioeconomic History    Marital status: Unknown      No family history on file.   Not on File   Social Determinants of Health     Tobacco Use: Not on file   Alcohol Use: Not on file   Financial Resource Strain: Not on file   Food Insecurity: Not on file   Transportation Needs: Not on file   Physical Activity: Not on file   Stress: Not on file   Social Connections: Unknown (11/3/2023)    Family and Community Support     Help with Day-to-Day Activities: Not on file     Lonely or Isolated: Not on file   Interpersonal Safety: Unknown (11/3/2023)    Abuse Screen     Unsafe at Home or Work/School: unable to answer (comment required)     Feels Threatened by Someone?: unable to answer (comment required)     Does Anyone Keep You from Contacting Others or Doint Things Outside the Home?: unable to answer (comment required)     Physical Sign of Abuse Present: no   Depression: Not on file   Housing Stability: Unknown (11/3/2023)    Housing Stability     Current Living Arrangements: Not on file     Potentially Unsafe Housing Conditions: Not on file   Utilities: Not on file   Health Literacy: Unknown (11/3/2023)    Education     Help with school or training?: Not on file     Preferred Language: Not on file   Employment: Unknown (11/3/2023)    Employment     Do you want help finding or keeping work or a job?: Not on file   Disabilities: Unknown (11/3/2023)    Disabilities     Concentrating, Remembering, or Making Decisions Difficulty: Not on file     Doing Errands Independently Difficulty: Not on file        Home Medications     Prior to Admission medications    Not on File        Objective / Physical Exam     Vital signs:  Temp: 93.9 °F (34.4 °C)  BP: 118/55  Heart Rate: 115  Resp: 24  SpO2: 97 %  Weight: 74.9 kg (165 lb 2 oz)    Admission Weight: Weight: 72 kg (158 lb 11.7 oz)    Physical Exam  Vitals and nursing note reviewed.   Constitutional:       Appearance: Normal appearance.   HENT:       Head: Normocephalic.      Nose: Nose normal.      Mouth/Throat:      Mouth: Mucous membranes are moist.      Pharynx: Oropharynx is clear.   Eyes:      Comments: Fixed and dilated   Cardiovascular:      Rate and Rhythm: Normal rate and regular rhythm.      Pulses: Normal pulses.      Heart sounds: Normal heart sounds.   Pulmonary:      Comments: ET tube in place  Abdominal:      General: Bowel sounds are normal.      Palpations: Abdomen is soft.   Musculoskeletal:         General: Normal range of motion.      Cervical back: Normal range of motion.   Skin:     General: Skin is warm and dry.      Capillary Refill: Capillary refill takes 2 to 3 seconds.   Neurological:      Comments: Status postcardiac arrest, nonresponsive, intubated   Psychiatric:      Comments: Status postcardiac arrest, nonresponsive, intubated          Labs     Results from last 7 days   Lab Units 11/02/23  2323   WBC 10*3/mm3 7.50   HEMATOCRIT % 40.7   PLATELETS 10*3/mm3 229      Results from last 7 days   Lab Units 11/02/23  2323   SODIUM mmol/L 142   POTASSIUM mmol/L 3.9   CHLORIDE mmol/L 102   CO2 mmol/L 21.0*   BUN mg/dL 11   CREATININE mg/dL 1.33*        Imaging     CT Angiogram Chest Pulmonary Embolism    Result Date: 11/3/2023  Impression: 1.Multifocal pneumonia. 2.No evidence of pulmonary embolism. Electronically Signed: Grey Newton MD  11/3/2023 12:22 AM EDT  Workstation ID: [id number]    CT Head Without Contrast    Result Date: 11/3/2023  Impression: 1.Gray-white differentiation is present, but poor throughout the brain and cerebellum. This could be related to anoxic brain injury. This could be further evaluated with follow-up brain MRI or serial CT as indicated. 2.No acute intracranial hemorrhage. Electronically Signed: Grey Newton MD  11/3/2023 12:20 AM EDT  Workstation ID: [id number]    XR Chest 1 View    Result Date: 11/2/2023  Impression: 1.ET tube tip 5.3 cm above the andry. 2.Right apical airspace disease. Electronically  Signed: Grey Newton MD  11/2/2023 11:37 PM EDT  Workstation ID: [id number]       Chest X ray: My independent assessment showed no infiltrates or effusions    EKG: My independent evaluation showed sinus rhythm, no ST -T changes    Current Medications     Scheduled Meds:  chlorhexidine, 15 mL, Mouth/Throat, Q12H  heparin (porcine), 5,000 Units, Subcutaneous, Q8H  insulin lispro, 2-7 Units, Subcutaneous, Q6H  pantoprazole, 40 mg, Intravenous, Q24H  piperacillin-tazobactam, 3.375 g, Intravenous, Q8H  senna-docusate sodium, 2 tablet, Oral, BID  sodium chloride, 10 mL, Intravenous, Q12H         Continuous Infusions:  EPINEPHrine, 0.02-0.5 mcg/kg/min  phenylephrine, 0.5-6 mcg/kg/min       Patient continues to be critically ill, remains at risk of clinical deterioration or death and needed high complexity decision making. I have spent a total of 50 minutes providing critical care services to this patient including but not limited to: review of labs/ microbiology/imaging/medications, serial monitoring of vital signs, adjusting ventilator settings as needed, review of other consultant's notes, review of events in the last 24 hrs, monitoring input/output, review of treatment plan with bedside nurse, RT and other treatment team, management of life support and nutrition needs. I also spoke with family about the plan of care and answered all questions.    Time spent in performing separately billable procedures and updating family is not included in the critical care time.       EDOUARD Sr   Critical Care  11/03/23   01:49 EDT       Electronically signed by Victoriano Phillip DO at 11/03/23 0815

## 2023-11-06 NOTE — PROGRESS NOTES
Nutrition Services    Patient Name: Yusuf Jin  YOB: 1970  MRN: [id number]  Admission date: 11/2/2023    PPE Documentation        PPE Worn By Provider Did not enter room for this encounter   PPE Worn By Patient  N/A     PROGRESS NOTE      Encounter Information: Checking in on nutrition plan of care.  Patient discussed in AM rounds.  Remains intubated.  Noted with plan for trach and PEG today.  HD today with 2L fluid removal.  RN plans to replace Phos and K+ after HD.  On levo.           PO Diet: NPO Diet NPO Type: Strict NPO   PO Supplements: None ordered    PO Intake:  NPO       Current nutrition support: RD to manage   Nutrition support review: No TF infusion at this time       Labs (reviewed below): Hypokalemia - to replaced today   Hypophosphatemia - to replace today        GI Function:  Liquid BM 11/4 (x 2 days ago)       Nutrition Intervention Updates: Begin Novasource Renal at 10mL/hr + 10mL/hr water flush per MD direction post PEG placement.         Results from last 7 days   Lab Units 11/06/23  0427 11/05/23 2001 11/05/23  1202 11/05/23  0756 11/05/23  0030 11/04/23  0831 11/04/23  0335   SODIUM mmol/L 137 138 139   < > 140   < > 144   POTASSIUM mmol/L 3.4* 4.4 4.5   < > 4.8   < > 4.4   CHLORIDE mmol/L 101 103 104   < > 104   < > 109*   CO2 mmol/L 27.0 25.0 25.0   < > 24.0   < > 21.0*   BUN mg/dL 34* 27* 18   < > 14   < > 20   CREATININE mg/dL 3.68* 3.03* 2.28*   < > 1.94*   < > 2.70*   CALCIUM mg/dL 7.9* 8.2* 9.0   < > 8.7   < > 7.7*   BILIRUBIN mg/dL 0.7  --   --   --  1.0  --  0.7   ALK PHOS U/L 68  --   --   --  45  --  43   ALT (SGPT) U/L 290*  --   --   --  385*  --  511*   AST (SGOT) U/L 380*  --   --   --  632*  --  1,287*   GLUCOSE mg/dL 110* 117* 88   < > 127*   < > 101*    < > = values in this interval not displayed.     Results from last 7 days   Lab Units 11/06/23  0427 11/05/23  1609 11/05/23  1202 11/05/23  0756 11/03/23  1026 11/03/23  0536   MAGNESIUM mg/dL 1.7  --   1.6 1.6   < > 1.9   PHOSPHORUS mg/dL 1.9*  --  4.1 3.5   < > 4.0   HEMOGLOBIN g/dL 9.3*   < >  --  10.7*   < > 16.1   HEMATOCRIT % 27.1*   < >  --  31.1*   < > 47.6   TRIGLYCERIDES mg/dL  --   --   --   --   --  90    < > = values in this interval not displayed.     COVID19   Date Value Ref Range Status   11/03/2023 Not Detected Not Detected - Ref. Range Final     Lab Results   Component Value Date    HGBA1C 5.10 11/03/2023       RD to follow up per protocol.    Electronically signed by:  Cassy Donovan RD  11/06/23 08:28 EST

## 2023-11-06 NOTE — CONSULTS
General Surgery Consult Note      Name: Yusuf Jin ADMIT: 2023   : 1970  PCP: Provider, No Known    MRN: [id number] LOS: 3 days   AGE/SEX: 52 y.o. male  ROOM: [address] [place] [address]      Patient Care Team:  Provider, No Known as PCP - General  Chief Complaint   Patient presents with    Cardiac Arrest       Subjective   52-year-old gentleman who was a witnessed arrest at home had return of spontaneous circulation.  Currently in the intensive care unit intubated weaning off of pressors no tube feeds yet.  No neck incisions or known history of neck radiation, has a small fat-containing epigastric hernia and some port incisions family unaware of his previous surgical history.    History reviewed. No pertinent past medical history.  No past surgical history on file.  History reviewed. No pertinent family history.     Medications Prior to Admission   Medication Sig Dispense Refill Last Dose    methocarbamol (ROBAXIN) 750 MG tablet Take 1 tablet by mouth Every 8 (Eight) Hours As Needed (pain).        cefepime, 2,000 mg, Intravenous, Q8H  chlorhexidine, 15 mL, Mouth/Throat, Q12H  insulin lispro, 2-7 Units, Subcutaneous, Q6H  levETIRAcetam, 750 mg, Intravenous, Q12H  pantoprazole, 40 mg, Intravenous, Q12H  potassium phosphate 20 mmol in sodium chloride 0.9 % 250 mL infusion, 20 mmol, Intravenous, Once  sodium chloride, 10 mL, Intravenous, Q12H  sodium chloride, 10 mL, Intravenous, Q12H  sodium chloride, 10 mL, Intravenous, Q12H      dexmedetomidine, 0.2-1.5 mcg/kg/hr, Last Rate: 1.4 mcg/kg/hr (23 1018)  dextrose, 50 mL/hr, Last Rate: 50 mL/hr (23 0801)  norepinephrine, 0.02-0.5 mcg/kg/min, Last Rate: 0.06 mcg/kg/min (23 0850)  Pharmacy Consult,   Pharmacy to Dose Cefepime,   Phoxillum BK4/2.5, 2,000 mL/hr, Last Rate: 2,000 mL/hr (23 1449)  Phoxillum BK4/2.5, 2,000 mL/hr, Last Rate: 2,000 mL/hr (23 1449)  Phoxillum BK4/2.5, 2,000 mL/hr, Last Rate: 2,000 mL/hr (23  1448)  PrismaSol BGK 2/3.5, 2,000 mL/hr, Last Rate: Stopped (11/05/23 0900)  PrismaSol BGK 2/3.5, 2,000 mL/hr, Last Rate: Stopped (11/05/23 0900)  PrismaSol BGK 2/3.5, 2,000 mL/hr, Last Rate: Stopped (11/05/23 0900)        acetaminophen **OR** acetaminophen    artificial tears    Calcium Replacement - Follow Nurse / BPA Driven Protocol    dextrose    dextrose    fentaNYL citrate (PF)    glucagon (human recombinant)    heparin (porcine)    ipratropium-albuterol    Magnesium Standard Dose Replacement - Follow Nurse / BPA Driven Protocol    nitroglycerin    ondansetron **OR** ondansetron    Pharmacy Consult    Pharmacy to Dose Cefepime    Phosphorus Replacement - Follow Nurse / BPA Driven Protocol    Potassium Replacement - Follow Nurse / BPA Driven Protocol    sodium chloride    sodium chloride    sodium chloride    sodium chloride    sodium chloride    sodium chloride    sodium chloride  Patient has no known allergies.    Review of Systems   Unable to perform ROS: Intubated        Objective     Vital Signs and Labs:  Vital Signs Patient Vitals for the past 24 hrs:   BP Temp Temp src Pulse Resp SpO2 Weight   11/06/23 1052 -- -- -- 59 -- 100 % --   11/06/23 0803 -- 99.2 °F (37.3 °C) Axillary -- (!) 30 -- --   11/06/23 0700 -- -- -- 93 -- 100 % --   11/06/23 0657 -- -- -- 92 -- 100 % --   11/06/23 0600 -- -- -- 94 -- 100 % --   11/06/23 0500 -- -- -- 93 -- 100 % --   11/06/23 0454 -- 98.7 °F (37.1 °C) Oral -- -- -- 82.8 kg (182 lb 8.7 oz)   11/06/23 0400 135/78 -- -- 89 -- 100 % --   11/06/23 0353 -- -- -- -- (!) 34 -- --   11/06/23 0300 -- -- -- 92 -- 100 % --   11/06/23 0200 -- -- -- 94 -- 100 % --   11/06/23 0100 -- -- -- 93 -- 100 % --   11/06/23 0021 -- -- -- -- (!) 30 -- --   11/06/23 0000 136/89 -- -- 92 -- 100 % --   11/05/23 2300 130/79 99.7 °F (37.6 °C) Axillary 92 -- 100 % --   11/05/23 2200 -- -- -- 93 -- 100 % --   11/05/23 2100 127/78 99.1 °F (37.3 °C) Axillary 94 (!) 30 100 % --   11/05/23 2000 122/69 --  -- 96 -- 100 % --   11/05/23 1900 -- -- -- 95 -- 100 % --   11/05/23 1830 -- -- -- 98 -- 100 % --   11/05/23 1800 -- -- -- 96 (!) 32 100 % --   11/05/23 1730 -- -- -- 98 -- 100 % --   11/05/23 1700 -- -- -- 98 -- 100 % --   11/05/23 1641 117/72 100 °F (37.8 °C) Axillary -- (!) 33 -- --   11/05/23 1630 -- -- -- 100 -- 100 % --   11/05/23 1626 -- -- -- 100 -- 100 % --   11/05/23 1600 -- -- -- 101 -- 100 % --   11/05/23 1530 -- -- -- 102 -- 100 % --   11/05/23 1500 -- -- -- 105 -- 100 % --   11/05/23 1430 -- -- -- 107 -- 100 % --   11/05/23 1400 -- -- -- 110 -- 100 % --   11/05/23 1330 -- -- -- 111 -- 100 % --   11/05/23 1300 -- -- -- 118 -- 100 % --   11/05/23 1230 -- -- -- (!) 122 -- 100 % --   11/05/23 1152 -- -- -- 118 (!) 30 99 % --   11/05/23 1130 -- -- -- 116 -- -- --     I/O:  I/O last 3 completed shifts:  In: 7248 [I.V.:7248]  Out: 4676 [Urine:120; Emesis/NG output:50; Stool:2650]    Physical Exam:  Physical Exam  Constitutional:       General: He is not in acute distress.     Appearance: Normal appearance. He is not ill-appearing.   HENT:      Head: Normocephalic and atraumatic.      Right Ear: External ear normal.      Left Ear: External ear normal.   Eyes:      Extraocular Movements: Extraocular movements intact.      Conjunctiva/sclera: Conjunctivae normal.   Cardiovascular:      Rate and Rhythm: Normal rate and regular rhythm.   Pulmonary:      Effort: Pulmonary effort is normal. No respiratory distress.   Abdominal:      General: There is no distension.      Palpations: Abdomen is soft.      Tenderness: There is no abdominal tenderness.   Musculoskeletal:         General: No swelling or deformity.   Skin:     General: Skin is warm and dry.   Neurological:      Mental Status: He is alert and oriented to person, place, and time. Mental status is at baseline.         CBC    Results from last 7 days   Lab Units 11/06/23  0902 11/06/23  0427 11/05/23  2342 11/05/23  1609 11/05/23  0756 11/05/23  0030  11/04/23  0831 11/04/23  0335 11/03/23  1756 11/03/23  1026 11/03/23  0536 11/03/23  0151 11/02/23  2323   WBC 10*3/mm3  --  8.40  --  8.10  --  10.70  --  6.40  --  2.00* 2.80*  --  7.50   HEMOGLOBIN g/dL 9.5* 9.3* 9.4* 9.6* 10.7* 12.0* 13.1 14.6   < > 16.0 16.1  --  13.6   HEMOGLOBIN, POC   --   --   --   --   --   --   --   --   --   --   --    < >  --    PLATELETS 10*3/mm3  --  53*  --  54*  --  81*  --  133*  --  231 242  --  229    < > = values in this interval not displayed.     BMP   Results from last 7 days   Lab Units 11/06/23  0427 11/05/23 2001 11/05/23  1202 11/05/23  0756 11/05/23  0030 11/04/23  1549 11/04/23  0831 11/04/23  0335   SODIUM mmol/L 137 138 139 137 140 140 141 144   POTASSIUM mmol/L 3.4* 4.4 4.5 4.3 4.8 5.7* 5.6* 4.4   CHLORIDE mmol/L 101 103 104 103 104 105 106 109*   CO2 mmol/L 27.0 25.0 25.0 26.0 24.0 23.0 23.0 21.0*   BUN mg/dL 34* 27* 18 13 14 18 24* 20   CREATININE mg/dL 3.68* 3.03* 2.28* 1.77* 1.94* 2.41* 3.23* 2.70*   GLUCOSE mg/dL 110* 117* 88 107* 127* 142* 207* 101*   MAGNESIUM mg/dL 1.7  --  1.6 1.6 1.6 1.9 1.8 1.4*   PHOSPHORUS mg/dL 1.9*  --  4.1 3.5 4.3 3.7 2.6 4.0     Radiology(recent) US Liver    Result Date: 11/6/2023  Impression: No acute sonographic abnormality of the liver. Electronically Signed: Andrea Galarza MD  11/6/2023 7:43 AM EST  Workstation ID: [id number]    XR Chest 1 View    Result Date: 11/6/2023  Impression: 1. Stable lines and support tubes. 2. Negative for pneumothorax. 3. Persistent central pulmonary vascular congestion and findings suggesting mild pulmonary edema, not significantly changed from the prior study. Electronically Signed: Andrea Galarza MD  11/6/2023 7:07 AM EST  Workstation ID: [id number]    CT Abdomen Pelvis Without Contrast    Result Date: 11/5/2023  Impression: 1. Nonspecific fluid and mixed liquid stool throughout the abdomen without signs of overt obstruction or active inflammation. Correlate for diarrheal illness. 2. Increasing patchy  airspace opacities in the lower lobes suspicious for pneumonia versus aspiration. 3. Retained contrast in the right kidney suggesting acute tubular necrosis. No hydronephrosis. 4. Findings of mild volume overload/third spacing including body wall edema, presacral edema and trace ascites. No drainable collection. 5. Other ancillary findings as above. Electronically Signed: Jermaine Heath MD  11/5/2023 11:15 AM EST  Workstation ID: [id number]    MRI Brain Without Contrast    Result Date: 11/5/2023  Impression: MRI findings suggesting diffuse anoxic brain injury. Electronically Signed: Jermaine Heath MD  11/5/2023 10:57 AM EST  Workstation ID: [id number]    XR Chest 1 View    Result Date: 11/5/2023  Impression: 1. Improving vascular congestion and pulmonary edema. 2. Support devices as above, including high ET tube 8.7 cm above andry similar in position to prior exam. Electronically Signed: Jermaine Heath MD  11/5/2023 8:33 AM EST  Workstation ID: [id number]     I reviewed the patient's new clinical results.    Assessment & Plan       Cardiac arrest with successful resuscitation    Marijuana abuse    Alcohol abuse    Elevated blood sugar      52-year-old gentleman who was a witnessed arrest at home had return of spontaneous circulation.  Currently in the intensive care unit intubated weaning off of pressors no tube feeds yet.  No neck incisions or known history of neck radiation, has a small fat-containing epigastric hernia and some port incisions family unaware of his previous surgical history. I discussed risk, benefits and alternatives to percutaneous endoscopic gastrostomy (PEG) placement including infection, bleeding, injury to colon or other intra-abdominal organs, tube dislodgment resulting in sepsis, and tube malfunction requiring further procedures patient's family elected to proceed with surgery.   I discussed risk benefits and alternatives to tracheostomy creation including, infection, bleeding, injury to  recurrent laryngeal nerve resulting in vocal cord paralysis, major vascular injury and patient's family elected to proceed with surgery.       This note was created using Dragon Voice Recognition software.    Rm Reeves MD  11/06/23  10:54 EST

## 2023-11-06 NOTE — CASE MANAGEMENT/SOCIAL WORK
Continued Stay Note   [place]     Patient Name: Yusuf Jin  MRN: [id number]  Today's Date: 11/6/2023    Admit Date: 11/2/2023    Plan: Dc Plan: [facility] accepted and following. Precert will be required. No PASRR required. Trach/PEG 11/6/2023. Will require 3 days of weaning documentation post Trach before precert can be started. Must have Permanent HD cath placed prior to DC.   Discharge Plan       Row Name 11/06/23 1505       Plan    Plan Dc Plan: [facility] accepted and following. Precert will be required. No PASRR required. Trach/PEG 11/6/2023. Will require 3 days of weaning documentation post Trach before precert can be started. Must have Permanent HD cath placed prior to DC.    Provided Post Acute Provider List? N/A    Provided Post Acute Provider Quality & Resource List? N/A    Plan Comments CM spoke with intensivist, nursing, and NP to obtain clinical upates in morning rounds. Family had decided to move forward with Tracheostomyand PEG placement. Patient in OR at this time.  [facility] and liaison Emilie notified. Patient insurance requires 3 days of weaning documentation post Tracheostomy before starting precert. Precert can potentially start Thursday 11/9/2023. Patient will have to have permanent HD cath placed and femoral line removed prior to DC. MD ordered testing for C Diff secondary to large amounts of liquid stool OP. Placed in spore precautions pending results. CM will continue to follow for any further needs and adjust discharge plan accordingly. DC Barriers: Cardiac monitoring, Vent/Trach, central line, arterial line, HD cath x2, NGT, FMS, Precedex gtt, D10 gtt, Dobutamine gtt, Levophed gtt, IV abx, IV Keppra, Abnormal labs, Fist HD tx today, and pending procedure.               Expected Discharge Date and Time       Expected Discharge Date Expected Discharge Time    Nov 13, 2023               Pauline  ARIELA Rm RN    Office Phone: [phone]  Office Cell:     [phone]

## 2023-11-06 NOTE — PROGRESS NOTES
Name: Yusuf Jin ADMIT: 2023   : 1970  PCP: Provider, No Known    MRN: [id number] LOS: 3 days   AGE/SEX: 52 y.o. male  ROOM:  [facility] 7/1     CC: concern for right hand ischemia  Interval History: Hands equally warm today.  Patient unresponsive  Subjective   Subjective     Review of Systems UTO as patient is non-responsive, anoxic brain injury.   Objective   Objective     Vitals:   Temp:  [97.8 °F (36.6 °C)-100 °F (37.8 °C)] 97.8 °F (36.6 °C)  Heart Rate:  [] 68  Resp:  [30-34] 30  BP: ()/(58-89) 117/77  FiO2 (%):  [40 %] 40 %    I/O this shift:  In: 1228 [I.V.:1228]  Out:      Scheduled Meds:     [MAR Hold] chlorhexidine, 15 mL, Mouth/Throat, Q12H  levETIRAcetam, 750 mg, Intravenous, Q12H  [MAR Hold] midodrine, 2.5 mg, Oral, TID AC  [MAR Hold] pantoprazole, 40 mg, Intravenous, Q12H  piperacillin-tazobactam, 3.375 g, Intravenous, Q12H  [MAR Hold] sodium chloride, 10 mL, Intravenous, Q12H  [MAR Hold] sodium chloride, 10 mL, Intravenous, Q12H  [MAR Hold] sodium chloride, 10 mL, Intravenous, Q12H      IV Meds:   dexmedetomidine, 0.2-1.5 mcg/kg/hr, Last Rate: 1.5 mcg/kg/hr (23 1202)  dextrose, 50 mL/hr, Last Rate: 50 mL/hr (23 0801)  norepinephrine, 0.02-0.5 mcg/kg/min, Last Rate: 0.06 mcg/kg/min (23 1320)  Pharmacy Consult,   Pharmacy to Dose Cefepime,   Phoxillum BK4/2.5, 2,000 mL/hr, Last Rate: 2,000 mL/hr (23 1449)  Phoxillum BK4/2.5, 2,000 mL/hr, Last Rate: 2,000 mL/hr (23 1449)  Phoxillum BK4/2.5, 2,000 mL/hr, Last Rate: 2,000 mL/hr (23 1448)  PrismaSol BGK 2/3.5, 2,000 mL/hr, Last Rate: Stopped (23)  PrismaSol BGK 2/3.5, 2,000 mL/hr, Last Rate: Stopped (23)  PrismaSol BGK 2/3.5, 2,000 mL/hr, Last Rate: Stopped (23)        Physical Exam  Intubated  Non-responsive to stimulation or voice.   Radial and ulnar signals are present bilaterally.  Palmar arch signals present bilaterally  Hands equally warm  bilaterally    Data Reviewed:  CBC    Results from last 7 days   Lab Units 11/06/23  0902 11/06/23  0427 11/05/23  2342 11/05/23  1609 11/05/23  0756 11/05/23  0030 11/04/23  0831 11/04/23  0335 11/03/23  1756 11/03/23  1026 11/03/23  0536 11/03/23  0151 11/02/23  2323   WBC 10*3/mm3  --  8.40  --  8.10  --  10.70  --  6.40  --  2.00* 2.80*  --  7.50   HEMOGLOBIN g/dL 9.5* 9.3* 9.4* 9.6* 10.7* 12.0* 13.1 14.6   < > 16.0 16.1  --  13.6   HEMOGLOBIN, POC   --   --   --   --   --   --   --   --   --   --   --    < >  --    PLATELETS 10*3/mm3  --  53*  --  54*  --  81*  --  133*  --  231 242  --  229    < > = values in this interval not displayed.     BMP   Results from last 7 days   Lab Units 11/06/23  0427 11/05/23  2001 11/05/23  1202 11/05/23  0756 11/05/23  0030 11/04/23  1549 11/04/23  0831 11/04/23  0335   SODIUM mmol/L 137 138 139 137 140 140 141 144   POTASSIUM mmol/L 3.4* 4.4 4.5 4.3 4.8 5.7* 5.6* 4.4   CHLORIDE mmol/L 101 103 104 103 104 105 106 109*   CO2 mmol/L 27.0 25.0 25.0 26.0 24.0 23.0 23.0 21.0*   BUN mg/dL 34* 27* 18 13 14 18 24* 20   CREATININE mg/dL 3.68* 3.03* 2.28* 1.77* 1.94* 2.41* 3.23* 2.70*   GLUCOSE mg/dL 110* 117* 88 107* 127* 142* 207* 101*   MAGNESIUM mg/dL 1.7  --  1.6 1.6 1.6 1.9 1.8 1.4*   PHOSPHORUS mg/dL 1.9*  --  4.1 3.5 4.3 3.7 2.6 4.0     Cr Clearance Estimated Creatinine Clearance: 27.2 mL/min (A) (by C-G formula based on SCr of 3.68 mg/dL (H)).  Coag   Results from last 7 days   Lab Units 11/05/23  1609 11/03/23  1257   INR  1.28* 1.21*   APTT seconds 39.3* 25.6     HbA1C   Lab Results   Component Value Date    HGBA1C 5.10 11/03/2023     Blood Glucose   Glucose   Date/Time Value Ref Range Status   11/06/2023 1143 124 (H) 70 - 105 mg/dL Final     Comment:     Serial Number: [id number]:  [id number]   11/06/2023 0919 111 (H) 70 - 105 mg/dL Final     Comment:     Serial Number: [id number]:  [id number]   11/05/2023 2308 117 (H) 70 - 105 mg/dL Final     Comment:     Serial  Number: [id number]:  [id number]   11/05/2023 1936 114 (H) 70 - 105 mg/dL Final     Comment:     Serial Number: [id number]:  [id number]   11/05/2023 1750 46 (C) 70 - 105 mg/dL Final     Comment:     Serial Number: [id number]:  [id number]   11/05/2023 1726 110 (H) 70 - 105 mg/dL Final     Comment:     Serial Number: [id number]:  [id number]   11/05/2023 0541 102 70 - 105 mg/dL Final     Comment:     Serial Number: [id number]:  [id number]   11/05/2023 0029 120 (H) 70 - 105 mg/dL Final     Comment:     Serial Number: [id number]:  [id number]     Infection   Results from last 7 days   Lab Units 11/04/23  1425 11/04/23  1133 11/03/23  0243 11/02/23  2358 11/02/23  2356   BLOODCX  No growth at 2 days No growth at 2 days  --  Staphylococcus, coagulase negative* No growth at 3 days   RESPCX   --   --  Scant growth (1+) Normal respiratory arnulfo. No S. aureus or Pseudomonas aeruginosa detected. Final report.  --   --    BCIDPCR   --   --   --  Staph spp, not aureus or lugdunensis. Identification by BCID2 PCR.*  --      CMP   Results from last 7 days   Lab Units 11/06/23  0427 11/05/23  2001 11/05/23  1202 11/05/23  0756 11/05/23  0030 11/04/23  1549 11/04/23  0831 11/04/23  0335 11/03/23  1026 11/03/23  0536 11/03/23  0151 11/02/23  2323   SODIUM mmol/L 137 138 139 137 140 140 141 144   < > 147*  --  142   POTASSIUM mmol/L 3.4* 4.4 4.5 4.3 4.8 5.7* 5.6* 4.4   < > 3.1*  --  3.9   CHLORIDE mmol/L 101 103 104 103 104 105 106 109*   < > 113*  --  102   CO2 mmol/L 27.0 25.0 25.0 26.0 24.0 23.0 23.0 21.0*   < > 17.0*  --  21.0*   BUN mg/dL 34* 27* 18 13 14 18 24* 20   < > 14  --  11   CREATININE mg/dL 3.68* 3.03* 2.28* 1.77* 1.94* 2.41* 3.23* 2.70*   < > 2.15*   < > 1.33*   GLUCOSE mg/dL 110* 117* 88 107* 127* 142* 207* 101*   < > 55*  --  266*   ALBUMIN g/dL 2.5*  --  2.8* 2.9* 2.9* 3.1* 2.5* 2.8*   < > 3.5  --  3.9   BILIRUBIN mg/dL 0.7  --   --   --  1.0  --   --  0.7  --  0.4  --  0.2   ALK PHOS  "U/L 68  --   --   --  45  --   --  43  --  79  --  65   AST (SGOT) U/L 380*  --   --   --  632*  --   --  1,287*  --  433*  --  154*   ALT (SGPT) U/L 290*  --   --   --  385*  --   --  511*  --  163*  --  93*    < > = values in this interval not displayed.     ABG    Results from last 7 days   Lab Units 11/05/23  0518 11/04/23  0417 11/03/23  1718 11/03/23  1308 11/03/23  1023 11/03/23  0424 11/03/23  0359   PH, ARTERIAL pH units 7.427 7.377 7.279* 7.130* 6.939* 6.943* 7.028*   PCO2, ARTERIAL mm Hg 37.1 35.0 42.7 57.8* 68.7* 74.7* 61.6*   PO2 ART mm Hg 190.9* 226.6* 376.7* 181.0* 229.4* 69.7* 55.1*   O2 SATURATION ART % 99.7* 99.8* 99.9* 99.1* 99.2* 78.8* 71.4*   BASE EXCESS ART mmol/L 0.3 -3.9* -6.6* -10.7* -18.8* -17.8* -15.4*     UA    Results from last 7 days   Lab Units 11/02/23  2333   NITRITE UA  Negative   WBC UA /HPF 0-2   BACTERIA UA /HPF None Seen   SQUAM EPITHEL UA /HPF 0-2     TIFFANY  No results found for: \"POCMETH\", \"POCAMPHET\", \"POCBARBITUR\", \"POCBENZO\", \"POCCOCAINE\", \"POCOPIATES\", \"POCOXYCODO\", \"POCPHENCYC\", \"POCPROPOXY\", \"POCTHC\", \"POCTRICYC\"  Lysis Labs   Results from last 7 days   Lab Units 11/06/23  0902 11/06/23  0427 11/05/23  2342 11/05/23  2001 11/05/23  1609 11/05/23  1202 11/05/23  0756 11/05/23  0030 11/04/23  1549 11/04/23  1425 11/04/23  0831 11/04/23  0335 11/04/23  0200 11/03/23  1756 11/03/23  1257 11/03/23  1026 11/03/23  0536 11/03/23  0151 11/02/23  2323   INR   --   --   --   --  1.28*  --   --   --   --   --   --   --   --   --  1.21*  --   --   --   --    APTT seconds  --   --   --   --  39.3*  --   --   --   --   --   --   --   --   --  25.6  --   --   --   --    FIBRINOGEN mg/dL  --   --   --   --   --   --   --   --   --  451*  --   --  361  --  154*  --   --   --   --    HEMOGLOBIN g/dL 9.5* 9.3* 9.4*  --  9.6*  --  10.7* 12.0*  --   --  13.1 14.6  --    < >  --  16.0 16.1  --  13.6   HEMOGLOBIN, POC   --   --   --   --   --   --   --   --   --   --   --   --   --   --   --   " --   --    < >  --    PLATELETS 10*3/mm3  --  53*  --   --  54*  --   --  81*  --   --   --  133*  --   --   --  231 242  --  229   CREATININE mg/dL  --  3.68*  --  3.03*  --  2.28* 1.77* 1.94* 2.41*  --  3.23* 2.70*  --    < >  --  2.71* 2.15*   < > 1.33*    < > = values in this interval not displayed.     Radiology(recent) US Liver    Result Date: 11/6/2023  Impression: No acute sonographic abnormality of the liver. Electronically Signed: Andrea Galarza MD  11/6/2023 7:43 AM EST  Workstation ID: [id number]    XR Chest 1 View    Result Date: 11/6/2023  Impression: 1. Stable lines and support tubes. 2. Negative for pneumothorax. 3. Persistent central pulmonary vascular congestion and findings suggesting mild pulmonary edema, not significantly changed from the prior study. Electronically Signed: Andrea Galarza MD  11/6/2023 7:07 AM EST  Workstation ID: [id number]    CT Abdomen Pelvis Without Contrast    Result Date: 11/5/2023  Impression: 1. Nonspecific fluid and mixed liquid stool throughout the abdomen without signs of overt obstruction or active inflammation. Correlate for diarrheal illness. 2. Increasing patchy airspace opacities in the lower lobes suspicious for pneumonia versus aspiration. 3. Retained contrast in the right kidney suggesting acute tubular necrosis. No hydronephrosis. 4. Findings of mild volume overload/third spacing including body wall edema, presacral edema and trace ascites. No drainable collection. 5. Other ancillary findings as above. Electronically Signed: Jermaine Heath MD  11/5/2023 11:15 AM EST  Workstation ID: [id number]    MRI Brain Without Contrast    Result Date: 11/5/2023  Impression: MRI findings suggesting diffuse anoxic brain injury. Electronically Signed: Jermaine Heath MD  11/5/2023 10:57 AM EST  Workstation ID: [id number]    XR Chest 1 View    Result Date: 11/5/2023  Impression: 1. Improving vascular congestion and pulmonary edema. 2. Support devices as above, including high ET tube 8.7  cm above nadry similar in position to prior exam. Electronically Signed: Jermaine Heath MD  2023 8:33 AM EST  Workstation ID: [id number]             Active Hospital Problems:   Active Hospital Problems    Diagnosis  POA    **Cardiac arrest with successful resuscitation [I46.9]  Yes    Marijuana abuse [F12.10]  Yes    Alcohol abuse [F10.10]  Yes    Elevated blood sugar [R73.9]  Yes      Resolved Hospital Problems   No resolved problems to display.      Assessment & Plan   Billin, Subsequent Hospital Care  Assessment / Plan     52-year-old gentleman with anoxic brain injury after cardiac arrest.  There is concern for ischemia of his right hand.  He had a radial arterial line on that side and arterial duplex shows occluded right radial artery.  And does not seem ischemic on exam today it is equally warm to the other side and he has radial and ulnar signals and palmar arch signals bilaterally.  Doppler studies as noted above.  Equal perfusion at the wrist bilaterally.  Unable to get digit pressors due to some technical difficulty.  I do not think patient needs any intervention for his hand at this time we will continue with conservative management/supportive care.  Will see patient as needed for now. Please do not hesitate to call with any questions or concerns.       Juve Medina II, MD  23  15:17 EST  Office Number [phone]

## 2023-11-06 NOTE — PROGRESS NOTES
Nephrology  Progress Note                                        [facility]    Patient Identification    Name: Yusuf Jin  Age: 52 y.o.  Sex: male  :  1970  MRN: [id number]      DATE OF SERVICE:  2023        Subective    Just finished regular dialysis  Currently only on Levophed  On pressors          Objective   Scheduled Meds:cefepime, 2,000 mg, Intravenous, Q8H  chlorhexidine, 15 mL, Mouth/Throat, Q12H  insulin lispro, 2-7 Units, Subcutaneous, Q6H  levETIRAcetam, 750 mg, Intravenous, Q12H  metoprolol tartrate, 25 mg, Nasogastric, Q12H  metroNIDAZOLE, 500 mg, Intravenous, Q8H  pantoprazole, 40 mg, Intravenous, Q12H  sodium chloride, 10 mL, Intravenous, Q12H  sodium chloride, 10 mL, Intravenous, Q12H  sodium chloride, 10 mL, Intravenous, Q12H          Continuous Infusions:dexmedetomidine, 0.2-1.5 mcg/kg/hr, Last Rate: 1.5 mcg/kg/hr (23 06)  dextrose, 50 mL/hr, Last Rate: 50 mL/hr (23 0701)  DOBUTamine, 5 mcg/kg/min, Last Rate: 5 mcg/kg/min (23 0034)  norepinephrine, 0.02-0.5 mcg/kg/min, Last Rate: Stopped (23 0333)  Pharmacy to Dose Cefepime,   phenylephrine, 0.5-6 mcg/kg/min, Last Rate: Stopped (23 1333)  Phoxillum BK4/2.5, 2,000 mL/hr, Last Rate: 2,000 mL/hr (23 144)  Phoxillum BK4/2.5, 2,000 mL/hr, Last Rate: 2,000 mL/hr (23 144)  Phoxillum BK4/2.5, 2,000 mL/hr, Last Rate: 2,000 mL/hr (23 144)  PrismaSol BGK 2/3.5, 2,000 mL/hr, Last Rate: Stopped (23 09)  PrismaSol BGK 2/3.5, 2,000 mL/hr, Last Rate: Stopped (23)  PrismaSol BGK 2/3.5, 2,000 mL/hr, Last Rate: Stopped (23)  sodium bicarbonate, 150 mEq, Last Rate: 150 mEq (23)  vasopressin, 0.01-0.1 Units/min, Last Rate: Stopped (23)        PRN Meds:  acetaminophen **OR** acetaminophen     "artificial tears    senna-docusate sodium **AND** polyethylene glycol **AND** bisacodyl **AND** bisacodyl    Calcium Replacement - Follow Nurse / BPA Driven Protocol    dextrose    dextrose    fentaNYL citrate (PF)    glucagon (human recombinant)    heparin (porcine)    ipratropium-albuterol    Magnesium Standard Dose Replacement - Follow Nurse / BPA Driven Protocol    nitroglycerin    ondansetron **OR** ondansetron    Pharmacy to Dose Cefepime    Phosphorus Replacement - Follow Nurse / BPA Driven Protocol    Potassium Replacement - Follow Nurse / BPA Driven Protocol    sodium chloride    sodium chloride    sodium chloride    sodium chloride    sodium chloride    sodium chloride    sodium chloride     Exam:  /78   Pulse 93   Temp 98.7 °F (37.1 °C) (Oral)   Resp (!) 34   Ht 175.3 cm (69\")   Wt 82.8 kg (182 lb 8.7 oz)   SpO2 100%   BMI 26.96 kg/m²     Intake/Output last 3 shifts:  I/O last 3 completed shifts:  In: 7248 [I.V.:7248]  Out: 4676 [Urine:120; Emesis/NG output:50; Stool:2650]    Intake/Output this shift:  No intake/output data recorded.    Physical exam:  General Appearance:  vent presors    Head:  Normocephalic, without obvious abnormality, atraumatic  Eyes: conjunctiva/corneas clear     Neck:  Supple,  no adenopathy;      Lungs:  Decreased BS occasion ronchi  Heart:  Regular rate and rhythm, S1 and S2 normal  Abdomen:  Soft, non-tender, bowel sounds active   Extremities: trace edema  Pulses: 2+ and symmetric all extremities  Skin:  No rashes or lesions       Data Review:  All labs (24hrs):   Recent Results (from the past 24 hour(s))   Renal Function Panel    Collection Time: 11/05/23  7:56 AM    Specimen: Blood   Result Value Ref Range    Glucose 107 (H) 65 - 99 mg/dL    BUN 13 6 - 20 mg/dL    Creatinine 1.77 (H) 0.76 - 1.27 mg/dL    Sodium 137 136 - 145 mmol/L    Potassium 4.3 3.5 - 5.2 mmol/L    Chloride 103 98 - 107 mmol/L    CO2 26.0 22.0 - 29.0 mmol/L    Calcium 9.3 8.6 - 10.5 mg/dL    " Albumin 2.9 (L) 3.5 - 5.2 g/dL    Phosphorus 3.5 2.5 - 4.5 mg/dL    Anion Gap 8.0 5.0 - 15.0 mmol/L    BUN/Creatinine Ratio 7.3 7.0 - 25.0    eGFR 45.4 (L) >60.0 mL/min/1.73   Magnesium    Collection Time: 11/05/23  7:56 AM    Specimen: Blood   Result Value Ref Range    Magnesium 1.6 1.6 - 2.6 mg/dL   Calcium, Ionized    Collection Time: 11/05/23  7:56 AM    Specimen: Blood   Result Value Ref Range    Ionized Calcium 1.25 1.20 - 1.30 mmol/L   Hemoglobin & Hematocrit, Blood    Collection Time: 11/05/23  7:56 AM    Specimen: Blood   Result Value Ref Range    Hemoglobin 10.7 (L) 13.0 - 17.7 g/dL    Hematocrit 31.1 (L) 37.5 - 51.0 %   Renal Function Panel    Collection Time: 11/05/23 12:02 PM    Specimen: Blood   Result Value Ref Range    Glucose 88 65 - 99 mg/dL    BUN 18 6 - 20 mg/dL    Creatinine 2.28 (H) 0.76 - 1.27 mg/dL    Sodium 139 136 - 145 mmol/L    Potassium 4.5 3.5 - 5.2 mmol/L    Chloride 104 98 - 107 mmol/L    CO2 25.0 22.0 - 29.0 mmol/L    Calcium 9.0 8.6 - 10.5 mg/dL    Albumin 2.8 (L) 3.5 - 5.2 g/dL    Phosphorus 4.1 2.5 - 4.5 mg/dL    Anion Gap 10.0 5.0 - 15.0 mmol/L    BUN/Creatinine Ratio 7.9 7.0 - 25.0    eGFR 33.5 (L) >60.0 mL/min/1.73   Magnesium    Collection Time: 11/05/23 12:02 PM    Specimen: Blood   Result Value Ref Range    Magnesium 1.6 1.6 - 2.6 mg/dL   Calcium, Ionized    Collection Time: 11/05/23 12:02 PM    Specimen: Blood   Result Value Ref Range    Ionized Calcium 1.18 (L) 1.20 - 1.30 mmol/L   Duplex Upper Extremity Art / Grafts - Right CAR    Collection Time: 11/05/23  3:15 PM   Result Value Ref Range    Rt. Subclavian Artery PSV 40.00 cm/s    Rt. Axillary Artery PSV 47.00 cm/s    Rt. Brachial Artery PSV 78.00 cm/s    Rt. Radial Artery PSV 0.00 cm/s    Rt. Ulnar Artery PSV 55.00 cm/s     CV VAS PRELIMINARY FINDINGS SCRIPTING 1.0    CBC Auto Differential    Collection Time: 11/05/23  4:09 PM    Specimen: Blood   Result Value Ref Range    WBC 8.10 3.40 - 10.80 10*3/mm3    RBC 2.99  (L) 4.14 - 5.80 10*6/mm3    Hemoglobin 9.6 (L) 13.0 - 17.7 g/dL    Hematocrit 28.3 (L) 37.5 - 51.0 %    MCV 94.7 79.0 - 97.0 fL    MCH 32.2 26.6 - 33.0 pg    MCHC 34.0 31.5 - 35.7 g/dL    RDW 13.2 12.3 - 15.4 %    RDW-SD 45.5 37.0 - 54.0 fl    MPV 7.7 6.0 - 12.0 fL    Platelets 54 (L) 140 - 450 10*3/mm3   Protime-INR    Collection Time: 11/05/23  4:09 PM    Specimen: Blood   Result Value Ref Range    Protime 13.7 (H) 9.6 - 11.7 Seconds    INR 1.28 (H) 0.93 - 1.10   aPTT    Collection Time: 11/05/23  4:09 PM    Specimen: Blood   Result Value Ref Range    PTT 39.3 (H) 24.0 - 31.0 seconds   Scan Slide    Collection Time: 11/05/23  4:09 PM    Specimen: Blood   Result Value Ref Range    Scan Slide     Manual Differential    Collection Time: 11/05/23  4:09 PM    Specimen: Blood   Result Value Ref Range    Neutrophil % 80.0 (H) 42.7 - 76.0 %    Lymphocyte % 5.0 (L) 19.6 - 45.3 %    Monocyte % 4.0 (L) 5.0 - 12.0 %    Bands %  10.0 (H) 0.0 - 5.0 %    Metamyelocyte % 1.0 (H) 0.0 - 0.0 %    Neutrophils Absolute 7.29 (H) 1.70 - 7.00 10*3/mm3    Lymphocytes Absolute 0.41 (L) 0.70 - 3.10 10*3/mm3    Monocytes Absolute 0.32 0.10 - 0.90 10*3/mm3    RBC Morphology Normal Normal    Dohle Bodies Present None Seen    Toxic Granulation Mod/2+ None Seen    Platelet Estimate Decreased Normal   POC Glucose Once    Collection Time: 11/05/23  5:26 PM    Specimen: Blood   Result Value Ref Range    Glucose 110 (H) 70 - 105 mg/dL   POC Glucose Once    Collection Time: 11/05/23  5:50 PM    Specimen: Blood   Result Value Ref Range    Glucose 46 (C) 70 - 105 mg/dL   POC Glucose Once    Collection Time: 11/05/23  7:36 PM    Specimen: Blood   Result Value Ref Range    Glucose 114 (H) 70 - 105 mg/dL   Basic Metabolic Panel    Collection Time: 11/05/23  8:01 PM    Specimen: Blood   Result Value Ref Range    Glucose 117 (H) 65 - 99 mg/dL    BUN 27 (H) 6 - 20 mg/dL    Creatinine 3.03 (H) 0.76 - 1.27 mg/dL    Sodium 138 136 - 145 mmol/L    Potassium 4.4  3.5 - 5.2 mmol/L    Chloride 103 98 - 107 mmol/L    CO2 25.0 22.0 - 29.0 mmol/L    Calcium 8.2 (L) 8.6 - 10.5 mg/dL    BUN/Creatinine Ratio 8.9 7.0 - 25.0    Anion Gap 10.0 5.0 - 15.0 mmol/L    eGFR 23.8 (L) >60.0 mL/min/1.73   POC Glucose Once    Collection Time: 11/05/23 11:08 PM    Specimen: Blood   Result Value Ref Range    Glucose 117 (H) 70 - 105 mg/dL   Hemoglobin & Hematocrit, Blood    Collection Time: 11/05/23 11:42 PM    Specimen: Blood   Result Value Ref Range    Hemoglobin 9.4 (L) 13.0 - 17.7 g/dL    Hematocrit 27.4 (L) 37.5 - 51.0 %   Magnesium    Collection Time: 11/06/23  4:27 AM    Specimen: Blood   Result Value Ref Range    Magnesium 1.7 1.6 - 2.6 mg/dL   Phosphorus    Collection Time: 11/06/23  4:27 AM    Specimen: Blood   Result Value Ref Range    Phosphorus 1.9 (C) 2.5 - 4.5 mg/dL   Comprehensive Metabolic Panel    Collection Time: 11/06/23  4:27 AM    Specimen: Blood   Result Value Ref Range    Glucose 110 (H) 65 - 99 mg/dL    BUN 34 (H) 6 - 20 mg/dL    Creatinine 3.68 (H) 0.76 - 1.27 mg/dL    Sodium 137 136 - 145 mmol/L    Potassium 3.4 (L) 3.5 - 5.2 mmol/L    Chloride 101 98 - 107 mmol/L    CO2 27.0 22.0 - 29.0 mmol/L    Calcium 7.9 (L) 8.6 - 10.5 mg/dL    Total Protein 4.9 (L) 6.0 - 8.5 g/dL    Albumin 2.5 (L) 3.5 - 5.2 g/dL    ALT (SGPT) 290 (H) 1 - 41 U/L    AST (SGOT) 380 (H) 1 - 40 U/L    Alkaline Phosphatase 68 39 - 117 U/L    Total Bilirubin 0.7 0.0 - 1.2 mg/dL    Globulin 2.4 gm/dL    A/G Ratio 1.0 g/dL    BUN/Creatinine Ratio 9.2 7.0 - 25.0    Anion Gap 9.0 5.0 - 15.0 mmol/L    eGFR 18.8 (L) >60.0 mL/min/1.73   Calcium, Ionized    Collection Time: 11/06/23  4:27 AM    Specimen: Blood   Result Value Ref Range    Ionized Calcium 1.12 (L) 1.20 - 1.30 mmol/L   CBC Auto Differential    Collection Time: 11/06/23  4:27 AM    Specimen: Blood   Result Value Ref Range    WBC 8.40 3.40 - 10.80 10*3/mm3    RBC 2.87 (L) 4.14 - 5.80 10*6/mm3    Hemoglobin 9.3 (L) 13.0 - 17.7 g/dL    Hematocrit 27.1  (L) 37.5 - 51.0 %    MCV 94.4 79.0 - 97.0 fL    MCH 32.3 26.6 - 33.0 pg    MCHC 34.2 31.5 - 35.7 g/dL    RDW 12.9 12.3 - 15.4 %    RDW-SD 44.6 37.0 - 54.0 fl    MPV 8.3 6.0 - 12.0 fL    Platelets 53 (L) 140 - 450 10*3/mm3   Scan Slide    Collection Time: 11/06/23  4:27 AM    Specimen: Blood   Result Value Ref Range    Scan Slide     Manual Differential    Collection Time: 11/06/23  4:27 AM    Specimen: Blood   Result Value Ref Range    Neutrophil % 70.0 42.7 - 76.0 %    Lymphocyte % 9.0 (L) 19.6 - 45.3 %    Basophil % 1.0 0.0 - 1.5 %    Bands %  18.0 (H) 0.0 - 5.0 %    Myelocyte % 2.0 (H) 0.0 - 0.0 %    Neutrophils Absolute 7.39 (H) 1.70 - 7.00 10*3/mm3    Lymphocytes Absolute 0.76 0.70 - 3.10 10*3/mm3    Basophils Absolute 0.08 0.00 - 0.20 10*3/mm3    RBC Morphology Normal Normal    Dohle Bodies Present None Seen    Toxic Granulation Slight/1+ None Seen    Platelet Estimate Decreased Normal          Imaging:  XR Chest 1 View    Result Date: 11/6/2023  Impression: 1. Stable lines and support tubes. 2. Negative for pneumothorax. 3. Persistent central pulmonary vascular congestion and findings suggesting mild pulmonary edema, not significantly changed from the prior study. Electronically Signed: Andrea Galarza MD  11/6/2023 7:07 AM EST  Workstation ID: [id number]    CT Abdomen Pelvis Without Contrast    Result Date: 11/5/2023  Impression: 1. Nonspecific fluid and mixed liquid stool throughout the abdomen without signs of overt obstruction or active inflammation. Correlate for diarrheal illness. 2. Increasing patchy airspace opacities in the lower lobes suspicious for pneumonia versus aspiration. 3. Retained contrast in the right kidney suggesting acute tubular necrosis. No hydronephrosis. 4. Findings of mild volume overload/third spacing including body wall edema, presacral edema and trace ascites. No drainable collection. 5. Other ancillary findings as above. Electronically Signed: Jermaine Heath MD  11/5/2023 11:15 AM  EST  Workstation ID: [id number]    MRI Brain Without Contrast    Result Date: 11/5/2023  Impression: MRI findings suggesting diffuse anoxic brain injury. Electronically Signed: Jermaine Heath MD  11/5/2023 10:57 AM EST  Workstation ID: [id number]    XR Chest 1 View    Result Date: 11/5/2023  Impression: 1. Improving vascular congestion and pulmonary edema. 2. Support devices as above, including high ET tube 8.7 cm above andry similar in position to prior exam. Electronically Signed: Jermaine Heath MD  11/5/2023 8:33 AM EST  Workstation ID: [id number]    XR Chest 1 View    Result Date: 11/4/2023  Impression: Appropriately placed right internal jugular CVC catheter with the tip in the mid SVC. No pneumothorax. Mild pulmonary edema pattern, improved as compared to the previous study. Electronically Signed: Caitlyn Urbina MD  11/4/2023 12:54 AM EDT  Workstation ID: [id number]    XR Chest 1 View    Result Date: 11/3/2023  Impression: Unchanged position of right PICC. Right subclavian dual-lumen venous catheter with the distal tip at the upper to mid SVC. Adequate position of endotracheal and enteric tube. Mildly improved patchy opacities throughout the lungs which may represent true improvement versus technical differences Electronically Signed: Ellis Gibson DO  11/3/2023 5:07 PM EDT  Workstation ID: [id number]    XR Abdomen KUB    Result Date: 11/3/2023  Impression: Feeding tube terminates in the stomach. Electronically Signed: Grey Newton MD  11/3/2023 3:21 AM EDT  Workstation ID: [id number]    XR Chest 1 View    Result Date: 11/3/2023  Impression: 1.Worsening diffuse bilateral airspace disease. 2.Endotracheal tube tip 4.6 cm above the andry. 3.Right upper extremity PICC terminates in the right brachiocephalic vein. Electronically Signed: Grey Newton MD  11/3/2023 3:21 AM EDT  Workstation ID: [id number]    CT Angiogram Chest Pulmonary Embolism    Result Date: 11/3/2023  Impression: 1.Multifocal pneumonia. 2.No evidence  of pulmonary embolism. Electronically Signed: Grey Newton MD  11/3/2023 12:22 AM EDT  Workstation ID: [id number]    CT Head Without Contrast    Result Date: 11/3/2023  Impression: 1.Gray-white differentiation is present, but poor throughout the brain and cerebellum. This could be related to anoxic brain injury. This could be further evaluated with follow-up brain MRI or serial CT as indicated. 2.No acute intracranial hemorrhage. Electronically Signed: Grey Newton MD  11/3/2023 12:20 AM EDT  Workstation ID: [id number]    XR Chest 1 View    Result Date: 11/2/2023  Impression: 1.ET tube tip 5.3 cm above the andry. 2.Right apical airspace disease. Electronically Signed: Grey Newton MD  11/2/2023 11:37 PM EDT  Workstation ID: [id number]     Assessment/Plan:     Cardiac arrest with successful resuscitation    Marijuana abuse    Alcohol abuse    Elevated blood sugar       Acute kidney injury   Severe metabolic/ respiratory acidosis   Cardiopulmonary arrest   ARDS   Possible anoxic brain injury   Hyperglycemia   Elevated troponin   Hypokalemia   Hypernatremia   Lactic acidosis  Recommendations:  Regular hemodialysis was tolerated today  Replace potassium and phosphorus  Currently on Levophed  Add midodrine  Acute tunneled dialysis catheter  Noted plans for trach and PEG  Eventually to [place]

## 2023-11-06 NOTE — NURSING NOTE
HD complete without complications. Pt lines reversed. Removed 2L. Pt stable at the time of this note.

## 2023-11-06 NOTE — CONSULTS
Palliative Care Consultation    Patient Name: Yusuf Jin  : 1970  MRN: [id number]  Allergies: Patient has no known allergies.    Requesting clinician:  Kenji   Reason for consult: Consultation for clarification of goals of care and code status.      Patient Code Status:   Code Status and Medical Interventions:   Ordered at: 23 1510     Level Of Support Discussed With:    Next of Kin (If No Surrogate)     Code Status (Patient has no pulse and is not breathing):    CPR (Attempt to Resuscitate)     Medical Interventions (Patient has pulse or is breathing):    Full Support           Chief Complaint:    Cardiac Arrest     History of Present Illness    Yusuf Jin is a 52 y.o. male who presented to [facility] ED on  from apartment with reports of cardiac arrest at his apartment. Patient was smoking THC when he collapsed. EMS was called. No family present. S crew responded, no medications administered. CPR started. Patient in asystole on arrival. No complaints reported. Per sibling, THC tested positive for fentanyl.     In ED: Glucose 266, Creatinine 1.33, ALT 93, , Lactate 7.7    ACLS started upon arrival to ED. ROSC achieved briefly at 2311, followed by VTACH again. ROSC again at 2315. Bradycardia and loss of pulse at 2335. ROSC after one cycle of CPR.     CT Angiogram Chest Pulmonary Embolism   Result Date: 11/3/2023  Impression: 1.Multifocal pneumonia. 2.No evidence of pulmonary embolism.     CT Head Without Contrast   Result Date: 11/3/2023  Impression: 1.Gray-white differentiation is present, but poor throughout the brain and cerebellum. This could be related to anoxic brain injury. This could be further evaluated with follow-up brain MRI or serial CT as indicated. 2.No acute intracranial hemorrhage.     TTM not indicated due to concern for anoxic brain injury. Admitted to ICU for vent management. Requiring 4 pressors with severe acidosis. Nephrology consulted. Started on CRRT on  11/3.    MRI suggestive of diffuse anoxic brain injury.     11/6 Palliative consult for goals of care discussion in an acutely ill patient with anoxic brain injury.     VITAL SIGNS:   Temp:  [98.7 °F (37.1 °C)-101.7 °F (38.7 °C)] 99.2 °F (37.3 °C)  Heart Rate:  [] 93  Resp:  [30-34] 30  BP: (117-136)/(69-89) 135/78  FiO2 (%):  [40 %-60 %] 40 %       PMH: Substance abuse    No past surgical history on file.    History reviewed. No pertinent family history.             LABS:    Results from last 7 days   Lab Units 11/06/23  0902 11/06/23  0427   WBC 10*3/mm3  --  8.40   HEMOGLOBIN g/dL 9.5* 9.3*   HEMATOCRIT % 27.9* 27.1*   PLATELETS 10*3/mm3  --  53*     Results from last 7 days   Lab Units 11/06/23  0427   SODIUM mmol/L 137   POTASSIUM mmol/L 3.4*   CHLORIDE mmol/L 101   CO2 mmol/L 27.0   BUN mg/dL 34*   CREATININE mg/dL 3.68*   GLUCOSE mg/dL 110*   CALCIUM mg/dL 7.9*     Results from last 7 days   Lab Units 11/06/23  0427   SODIUM mmol/L 137   POTASSIUM mmol/L 3.4*   CHLORIDE mmol/L 101   CO2 mmol/L 27.0   BUN mg/dL 34*   CREATININE mg/dL 3.68*   CALCIUM mg/dL 7.9*   BILIRUBIN mg/dL 0.7   ALK PHOS U/L 68   ALT (SGPT) U/L 290*   AST (SGOT) U/L 380*   GLUCOSE mg/dL 110*         IMAGING STUDIES:  US Liver    Result Date: 11/6/2023  Impression: No acute sonographic abnormality of the liver. Electronically Signed: Andrea Galarza MD  11/6/2023 7:43 AM EST  Workstation ID: [id number]    XR Chest 1 View    Result Date: 11/6/2023  Impression: 1. Stable lines and support tubes. 2. Negative for pneumothorax. 3. Persistent central pulmonary vascular congestion and findings suggesting mild pulmonary edema, not significantly changed from the prior study. Electronically Signed: Andrea Galarza MD  11/6/2023 7:07 AM EST  Workstation ID: [id number]    CT Abdomen Pelvis Without Contrast    Result Date: 11/5/2023  Impression: 1. Nonspecific fluid and mixed liquid stool throughout the abdomen without signs of overt obstruction or active  inflammation. Correlate for diarrheal illness. 2. Increasing patchy airspace opacities in the lower lobes suspicious for pneumonia versus aspiration. 3. Retained contrast in the right kidney suggesting acute tubular necrosis. No hydronephrosis. 4. Findings of mild volume overload/third spacing including body wall edema, presacral edema and trace ascites. No drainable collection. 5. Other ancillary findings as above. Electronically Signed: Jermaine Heath MD  11/5/2023 11:15 AM EST  Workstation ID: [id number]    MRI Brain Without Contrast    Result Date: 11/5/2023  Impression: MRI findings suggesting diffuse anoxic brain injury. Electronically Signed: Jermaine Heath MD  11/5/2023 10:57 AM EST  Workstation ID: [id number]    XR Chest 1 View    Result Date: 11/5/2023  Impression: 1. Improving vascular congestion and pulmonary edema. 2. Support devices as above, including high ET tube 8.7 cm above andry similar in position to prior exam. Electronically Signed: Jermaine Heath MD  11/5/2023 8:33 AM EST  Workstation ID: [id number]       I reviewed the patient's new clinical results including labs, imaging, and vitals.        Scheduled Meds:  cefepime, 2,000 mg, Intravenous, Q8H  chlorhexidine, 15 mL, Mouth/Throat, Q12H  insulin lispro, 2-7 Units, Subcutaneous, Q6H  levETIRAcetam, 750 mg, Intravenous, Q12H  metoprolol tartrate, 25 mg, Nasogastric, Q12H  metroNIDAZOLE, 500 mg, Intravenous, Q8H  pantoprazole, 40 mg, Intravenous, Q12H  potassium phosphate 20 mmol in sodium chloride 0.9 % 250 mL infusion, 20 mmol, Intravenous, Once  sodium chloride, 10 mL, Intravenous, Q12H  sodium chloride, 10 mL, Intravenous, Q12H  sodium chloride, 10 mL, Intravenous, Q12H      Continuous Infusions:  dexmedetomidine, 0.2-1.5 mcg/kg/hr, Last Rate: 1.5 mcg/kg/hr (11/06/23 0619)  dextrose, 50 mL/hr, Last Rate: 50 mL/hr (11/06/23 0801)  DOBUTamine, 5 mcg/kg/min, Last Rate: 5 mcg/kg/min (11/06/23 0034)  norepinephrine, 0.02-0.5 mcg/kg/min, Last Rate:  0.06 mcg/kg/min (11/06/23 0850)  Pharmacy to Dose Cefepime,   phenylephrine, 0.5-6 mcg/kg/min, Last Rate: Stopped (11/04/23 1333)  Phoxillum BK4/2.5, 2,000 mL/hr, Last Rate: 2,000 mL/hr (11/04/23 1449)  Phoxillum BK4/2.5, 2,000 mL/hr, Last Rate: 2,000 mL/hr (11/04/23 1449)  Phoxillum BK4/2.5, 2,000 mL/hr, Last Rate: 2,000 mL/hr (11/04/23 1448)  PrismaSol BGK 2/3.5, 2,000 mL/hr, Last Rate: Stopped (11/05/23 0900)  PrismaSol BGK 2/3.5, 2,000 mL/hr, Last Rate: Stopped (11/05/23 0900)  PrismaSol BGK 2/3.5, 2,000 mL/hr, Last Rate: Stopped (11/05/23 0900)  vasopressin, 0.01-0.1 Units/min, Last Rate: Stopped (11/06/23 0255)        I have reviewed patient's current medication list.     Review of Systems   Unable to perform ROS: Intubated   All other systems reviewed and are negative.        Physical Exam  Vitals and nursing note reviewed.   Constitutional:       Appearance: He is ill-appearing.   HENT:      Head: Normocephalic and atraumatic.      Nose: Nose normal.      Mouth/Throat:      Mouth: Mucous membranes are dry.      Comments: ET tube   Eyes:      Conjunctiva/sclera: Conjunctivae normal.   Cardiovascular:      Rate and Rhythm: Bradycardia present. Rhythm irregular.      Pulses: Normal pulses.   Pulmonary:      Effort: Pulmonary effort is normal.   Abdominal:      Palpations: Abdomen is soft.   Musculoskeletal:         General: Normal range of motion.      Cervical back: Normal range of motion.   Skin:     General: Skin is dry.   Neurological:      General: No focal deficit present.      Comments: Intubated, unresponsive             PROBLEM LIST:    Cardiac arrest with successful resuscitation    Marijuana abuse    Alcohol abuse    Elevated blood sugar          ASSESSMENT/PLAN:    Cardiopulmonary arrest: with ROSC achieved. No TTM. Suspected anoxic brain injury.     Anoxic Brain injury: Neurology consulted. CT suggestive of brain injury. MRI suggestive of diffuse anoxic brain injury.     CONOR: with severe acidosis.  Worsening kidney function. Nephrology consulted. Started on CRRT on 11/3.     These illnesses and their management contribute to the need for a palliative consult and advanced care planning.      ADVANCED CARE PLANNIN/6 Patient is intubated, unresponsive during time of visit. Completing dialysis with plans to be taken to OR shortly after for trach and PEG. Discussed with nursing. Plan at this time is for patient to continue aggressive treatment with likely discharge to LTACH on trach and PEG. Multiple conversations have been had with family regarding patients poor prognosis and concern for quality of life. Daughter is decision maker and has stated that she wants to continue to pursue aggressive treatment. No family present at time of rounds. I will follow as needed.       Advanced Directives:    Health Care Directive on file:    Health Care Surrogate:      Palliative Performance Scale Score:    Comments:           Decisional Capacity: no  Patient's understanding of illness: unknown  Patient goals of care:  Full code, full intervention      Thank you for this consult and allowing us to participate in patient's plan of care. Palliative Care Team will continue to follow patient.       I spent 48 minutes reviewing providers documentation, medication records, assessing and examining patient, discussing with family, answering questions, providing guidance about a plan of care, and coordinating care with other healthcare members. More than 50% of time spent face to face discussing disease education, current clinical status, and medication management.         Yolanda Dawn, APRN  2023

## 2023-11-06 NOTE — PROGRESS NOTES
Critical Care Progress Note   Yusuf Jin : 1970 MRN:[id number] LOS:3     Principal Problem: Cardiac arrest with successful resuscitation     Reason for follow up: All the medical problems listed below    Summary     A 52 y.o. male with PMH of marijuana abuse and alcoholism presented to the hospital after a witnessed cardiac arrest and was admitted with a principal diagnosis of Cardiac arrest with successful resuscitation. Apparently, his marijuana was tested positive for fentanyl on a home kit, done by his sister.  Initial rhythm of asystole, unknown duration of CPR by EMS, multiple rounds of CPR in ED.  Intubated in the ED for hypoxic respiratory failure.   CT angio showed multifocal pneumonia.  CT head on admission with poor gray-white differentiation.  MRI brain on  showed diffuse anoxic brain injury.  Neurology was consulted and deemed poor prognosis.    On admission, he also went into full-blown septic shock, treated with broad-spectrum antibiotics and needed multiple vasopressors.  Also had CONOR with metabolic acidosis, treated with bicarb drip and nephrology was consulted.  Eventually needed CRRT. Subsequently, also developed right hand ischemia due to radial artery dissection, vascular surgery was consulted and recommended conservative management.    Palliative care consulted for goals of care discussion.  Family opted for trach/PEG and LTAC placement.    Significant events     23 : Remove subclavian central line.  DC bicarb drip.  Will consult IR for tunneled HD catheter placement.    Assessment / Plan     Cardiac arrest  Presumed to be from substance abuse.  Echocardiogram with LVEF of 26 to 30%.      Cardiogenic shock  Still requiring norepinephrine.  DC Dobutrex and metoprolol.  Possibility of septic shock cannot be ruled out.  Having profuse diarrhea, CT abdomen suspicious for enterocolitis.  Check C. difficile toxin.  Switch cefepime to Zosyn.    Acute Systolic heart failure:    Currently decompensated.   Last ECHO showed an EF of 26-30%.   Unclear etiology. Holding off on any ischemic evaluation at this time until mentation improved.  Monitor Input/Output very closely. Follow daily weights.   Net IO Since Admission: 5,660.72 mL [11/06/23 1231]    Acute respiratory failure with hypoxia and hypercapnia:   Likely due to pneumonia  Ventilator settings noted and adjusted as needed.   Minimize sedation/analgesia to keep RASS of 0 to -1.    Acute Kidney Injury:   Remains anuric.   Likely ATN from shock state.  DC further bicarb drip.  Discussed with nephrology, will need tunneled HD catheter.  Monitor Input/Output very closely.   Net IO Since Admission: 5,660.72 mL [11/06/23 1231]     Anoxic brain injury  MRI on 11/5 showed diffuse global anoxic brain injury.    Neurology following, deemed poor long-term prognosis.  On empiric Keppra per neurology.    Right radial artery dissection  Vascular surgery following, recommended conservative management.    Acute thrombocytopenia  Unclear etiology.  HIT antibody negative.  Some component of hemodilution is possible given that he is in 7.5 L positive fluid balance.  Monitor platelets closely.  Normal INR, DIC ruled out.    Normocytic anemia: Likely from hemodilution, hemoglobin dropped from 13.6 -> 9.5.  Cannot rule out GI bleed.  Hold heparin for now.    Hypoglycemia: On D10 drip, tube feeds on hold for PEG procedure today.    Coagulase-negative staph bacteremia: Likely contaminant, no further work-up needed.  DC vancomycin.    Transaminitis/shock liver: Liver enzymes improving.    Code status:   Level Of Support Discussed With: Next of Kin (If No Surrogate)  Code Status (Patient has no pulse and is not breathing): CPR (Attempt to Resuscitate)  Medical Interventions (Patient has pulse or is breathing): Full Support       Nutrition: NPO Diet NPO Type: Strict NPO   Diet, Tube Feeding Tube Feeding Formula: RD to Initiate & Manage; Tube Feeding Type: RD to  Manage    DVT prophylaxis:  Medical and mechanical DVT prophylaxis orders are present.     Subjective / Review of systems     Review of Systems   Intubated and obtunded  Objective / Physical Exam   Vital signs:  Temp: 99.2 °F (37.3 °C)  BP: 135/78  Heart Rate: 59  Resp: (!) 30  SpO2: 100 %  Weight: 82.8 kg (182 lb 8.7 oz)    Admission Weight: Weight: 72 kg (158 lb 11.7 oz)  Current Weight: Weight: 82.8 kg (182 lb 8.7 oz)    Input/Output in last 24 hours:    Intake/Output Summary (Last 24 hours) at 11/6/2023 1231  Last data filed at 11/6/2023 1229  Gross per 24 hour   Intake 4482 ml   Output 3075 ml   Net 1407 ml      Physical Exam  Vitals and nursing note reviewed.   Constitutional:       Appearance: Normal appearance.      Interventions: He is intubated.   HENT:      Head: Normocephalic and atraumatic.      Mouth/Throat:      Mouth: Mucous membranes are moist.      Comments: ET tube in place  Eyes:      General: No scleral icterus.     Conjunctiva/sclera: Conjunctivae normal.   Cardiovascular:      Rate and Rhythm: Normal rate.      Pulses: Normal pulses.      Heart sounds: No murmur heard.     No gallop.   Pulmonary:      Effort: Pulmonary effort is normal. He is intubated.      Breath sounds: Rales (Bibasilar) present. No wheezing.   Abdominal:      General: Bowel sounds are normal.      Palpations: Abdomen is soft.      Tenderness: There is no abdominal tenderness.   Musculoskeletal:      Right lower leg: No edema.      Left lower leg: No edema.   Skin:     Comments: Right IJ central line, right subclavian central line, right femoral HD catheter    Improved warmth on right hand   Neurological:      Comments: Obtunded        Radiology and Labs     Results from last 7 days   Lab Units 11/06/23  0902 11/06/23  0427 11/05/23  2342 11/05/23  1609 11/05/23  0756 11/05/23  0030 11/04/23  0831 11/04/23  0335 11/03/23  1756 11/03/23  1026   WBC 10*3/mm3  --  8.40  --  8.10  --  10.70  --  6.40  --  2.00*   HEMATOCRIT %  27.9* 27.1* 27.4* 28.3* 31.1* 34.8*   < > 42.7   < > 47.8   PLATELETS 10*3/mm3  --  53*  --  54*  --  81*  --  133*  --  231    < > = values in this interval not displayed.      Results from last 7 days   Lab Units 11/06/23  0427 11/05/23 2001 11/05/23  1202 11/05/23  0756 11/05/23  0030   SODIUM mmol/L 137 138 139 137 140   POTASSIUM mmol/L 3.4* 4.4 4.5 4.3 4.8   CHLORIDE mmol/L 101 103 104 103 104   CO2 mmol/L 27.0 25.0 25.0 26.0 24.0   BUN mg/dL 34* 27* 18 13 14   CREATININE mg/dL 3.68* 3.03* 2.28* 1.77* 1.94*      Current medications   Scheduled Meds: chlorhexidine, 15 mL, Mouth/Throat, Q12H  levETIRAcetam, 750 mg, Intravenous, Q12H  midodrine, 2.5 mg, Oral, TID AC  pantoprazole, 40 mg, Intravenous, Q12H  piperacillin-tazobactam, 3.375 g, Intravenous, Q12H  potassium phosphate 20 mmol in sodium chloride 0.9 % 250 mL infusion, 20 mmol, Intravenous, Once  sodium chloride, 10 mL, Intravenous, Q12H  sodium chloride, 10 mL, Intravenous, Q12H  sodium chloride, 10 mL, Intravenous, Q12H      Continuous Infusions: dexmedetomidine, 0.2-1.5 mcg/kg/hr, Last Rate: 1.5 mcg/kg/hr (11/06/23 1202)  dextrose, 50 mL/hr, Last Rate: 50 mL/hr (11/06/23 0801)  norepinephrine, 0.02-0.5 mcg/kg/min, Last Rate: 0.08 mcg/kg/min (11/06/23 1130)  Pharmacy Consult,   Pharmacy to Dose Cefepime,   Phoxillum BK4/2.5, 2,000 mL/hr, Last Rate: 2,000 mL/hr (11/04/23 1449)  Phoxillum BK4/2.5, 2,000 mL/hr, Last Rate: 2,000 mL/hr (11/04/23 1449)  Phoxillum BK4/2.5, 2,000 mL/hr, Last Rate: 2,000 mL/hr (11/04/23 1448)  PrismaSol BGK 2/3.5, 2,000 mL/hr, Last Rate: Stopped (11/05/23 0900)  PrismaSol BGK 2/3.5, 2,000 mL/hr, Last Rate: Stopped (11/05/23 0900)  PrismaSol BGK 2/3.5, 2,000 mL/hr, Last Rate: Stopped (11/05/23 0900)      Patient continues to be critically ill, needed high complexity decision making and remains at high risk of clinical deterioration or death. I have spent a total of 31 minutes providing critical care services to this patient  including but not limited to : review of labs/ microbiology/imaging/medications, serial monitoring of vital signs, adjusting ventilator settings as needed, review of other consultant's notes, monitoring input/output, review of treatment plan with bedside nurse and other treatment team, management of life support and nutrition needs.       Tommy Mendieta MD   Critical Care  11/06/23   12:31 EST

## 2023-11-06 NOTE — ANESTHESIA POSTPROCEDURE EVALUATION
Patient: Yusuf Jin    Procedure Summary       Date: 11/06/23 Room / Location: [facility] OR 08 / [facility] MAIN OR    Anesthesia Start: 1404 Anesthesia Stop:     Procedures:       TRACHEOSTOMY (Neck)      ESOPHAGOGASTRODUODENOSCOPY WITH PERCUTANEOUS ENDOSCOPIC GASTROSTOMY TUBE INSERTION Diagnosis:     Surgeons: Rm Reeves MD Provider: Cydney Bazzi MD    Anesthesia Type: general ASA Status: 4            Anesthesia Type: general    Vitals  Vitals Value Taken Time   /68 11/06/23 1546   Temp     Pulse 64 11/06/23 1546   Resp     SpO2 100 % 11/06/23 1546   Vitals shown include unfiled device data.        Post Anesthesia Care and Evaluation    Patient location during evaluation: ICU  Patient participation: complete - patient cannot participate  Level of consciousness: obtunded/minimal responses  Pain scale: See nurse's notes for pain score.  Pain management: adequate    Airway patency: patent  Anesthetic complications: No anesthetic complications  PONV Status: none  Cardiovascular status: acceptable  Respiratory status: acceptable, ventilator and ETT  Hydration status: acceptable    Comments: Patient seen and examined postoperatively; vital signs stable; SpO2 greater than or equal to 90%; cardiopulmonary status stable; nausea/vomiting adequately controlled; pain adequately controlled; no apparent anesthesia complications; patient discharged from anesthesia care when discharge criteria were met

## 2023-11-07 NOTE — PROGRESS NOTES
General Surgery Progress Note    Name: Yusuf Jin ADMIT: 2023   : 1970  PCP: Provider, No Known    MRN: [id number] LOS: 4 days   AGE/SEX: 52 y.o. male  ROOM: [address] [place] [address]    Chief Complaint   Patient presents with    Cardiac Arrest     Subjective     On vent with trach in place, tolerating tube feeds at trickle rate    Objective     Scheduled Medications:   ALPRAZolam, 0.5 mg, Per G Tube, TID  chlorhexidine, 15 mL, Mouth/Throat, Q12H  [START ON 2023] lansoprazole, 30 mg, Per G Tube, Daily  levETIRAcetam, 750 mg, Intravenous, Q12H  midodrine, 5 mg, Per G Tube, TID AC  piperacillin-tazobactam, 3.375 g, Intravenous, Q12H  sodium chloride, 10 mL, Intravenous, Q12H  sodium chloride, 10 mL, Intravenous, Q12H  sodium chloride, 10 mL, Intravenous, Q12H        Active Infusions:  dexmedetomidine, 0.2-1.5 mcg/kg/hr, Last Rate: Stopped (23 1130)  dextrose, 25 mL/hr, Last Rate: 25 mL/hr (23 0943)  norepinephrine, 0.02-0.5 mcg/kg/min, Last Rate: 0.02 mcg/kg/min (23 1359)  Pharmacy Consult,         As Needed Medications:    acetaminophen **OR** acetaminophen    artificial tears    Calcium Replacement - Follow Nurse / BPA Driven Protocol    dextrose    dextrose    fentaNYL citrate (PF)    glucagon (human recombinant)    heparin (porcine)    ipratropium-albuterol    Magnesium Standard Dose Replacement - Follow Nurse / BPA Driven Protocol    nitroglycerin    ondansetron **OR** ondansetron    Pharmacy Consult    Phosphorus Replacement - Follow Nurse / BPA Driven Protocol    Potassium Replacement - Follow Nurse / BPA Driven Protocol    sodium chloride    sodium chloride    sodium chloride    sodium chloride    sodium chloride    Vital Signs  Vital Signs Patient Vitals for the past 24 hrs:   BP Temp Temp src Pulse Resp SpO2 Weight   23 1450 -- -- -- 79 -- 100 % --   23 1203 -- 97.9 °F (36.6 °C) Axillary -- (!) 30 -- --   23 1145 122/77 -- -- -- -- -- --   23 1118  98/57 -- -- 91 23 92 % --   11/07/23 1106 (!) 83/52 -- -- 83 (!) 31 100 % --   11/07/23 1050 100/60 -- -- -- -- -- --   11/07/23 1049 -- -- -- 85 24 100 % --   11/07/23 1030 106/60 98.8 °F (37.1 °C) Axillary 74 (!) 31 100 % --   11/07/23 0958 154/93 99.3 °F (37.4 °C) Axillary 72 (!) 30 100 % --   11/07/23 0952 -- -- -- -- -- 100 % --   11/07/23 0802 -- 98.8 °F (37.1 °C) Axillary -- (!) 30 -- --   11/07/23 0700 137/88 -- -- 74 -- 100 % --   11/07/23 0615 -- -- -- 75 (!) 30 100 % --   11/07/23 0600 146/92 -- -- 72 -- 100 % 84.8 kg (186 lb 15.2 oz)   11/07/23 0500 126/81 -- -- 67 -- 100 % --   11/07/23 0400 145/90 -- -- 71 -- 100 % --   11/07/23 0324 109/69 99 °F (37.2 °C) Axillary 61 (!) 30 100 % --   11/07/23 0300 97/54 -- -- 59 -- 99 % --   11/07/23 0100 98/64 -- -- 62 -- 100 % --   11/07/23 0051 96/58 -- -- 60 -- -- --   11/07/23 0000 102/65 98 °F (36.7 °C) Axillary 64 (!) 30 100 % --   11/06/23 2300 108/71 -- -- 64 -- 100 % --   11/06/23 2214 120/71 -- -- 66 -- -- --   11/06/23 2200 107/73 -- -- 63 -- 100 % --   11/06/23 2100 106/70 -- -- 67 -- 100 % --   11/06/23 2000 117/81 -- -- 66 -- 100 % --   11/06/23 1916 110/69 97.9 °F (36.6 °C) Axillary 66 (!) 32 100 % --   11/06/23 1655 116/74 97.4 °F (36.3 °C) Axillary -- (!) 30 -- --   11/06/23 1630 107/75 -- -- 67 -- 100 % --   11/06/23 1615 109/71 -- -- 65 -- 100 % --   11/06/23 1600 105/70 -- -- 66 -- 100 % --   11/06/23 1545 107/68 -- -- 62 -- 100 % --   11/06/23 1530 112/71 -- -- 75 -- 97 % --     I/O:  I/O last 3 completed shifts:  In: 6815 [I.V.:6455; Other:171; NG/GT:189]  Out: 3310 [Urine:160; Stool:1150]    Physical Exam:  Physical Exam  Constitutional:       General: He is not in acute distress.     Appearance: Normal appearance. He is not ill-appearing.   HENT:      Head: Normocephalic and atraumatic.      Right Ear: External ear normal.      Left Ear: External ear normal.   Eyes:      Extraocular Movements: Extraocular movements intact.       Conjunctiva/sclera: Conjunctivae normal.   Cardiovascular:      Rate and Rhythm: Normal rate and regular rhythm.   Pulmonary:      Effort: Pulmonary effort is normal. No respiratory distress.   Abdominal:      General: There is no distension.      Palpations: Abdomen is soft.      Tenderness: There is no abdominal tenderness.   Musculoskeletal:         General: No swelling or deformity.   Skin:     General: Skin is warm and dry.   Neurological:      Mental Status: He is alert and oriented to person, place, and time. Mental status is at baseline.         Results Review:     CBC    Results from last 7 days   Lab Units 11/07/23  0452 11/06/23  0902 11/06/23  0427 11/05/23  2342 11/05/23  1609 11/05/23  0756 11/05/23  0030 11/04/23  0831 11/04/23  0335 11/03/23  1756 11/03/23  1026 11/03/23  0536   WBC 10*3/mm3 11.70*  --  8.40  --  8.10  --  10.70  --  6.40  --  2.00* 2.80*   HEMOGLOBIN g/dL 9.3* 9.5* 9.3* 9.4* 9.6* 10.7* 12.0*   < > 14.6   < > 16.0 16.1   PLATELETS 10*3/mm3 59*  --  53*  --  54*  --  81*  --  133*  --  231 242    < > = values in this interval not displayed.     BMP   Results from last 7 days   Lab Units 11/07/23  0452 11/06/23  0427 11/05/23  2001 11/05/23  1202 11/05/23  0756 11/05/23  0030 11/04/23  1549 11/04/23  0831   SODIUM mmol/L 136 137 138 139 137 140 140 141   POTASSIUM mmol/L 3.8 3.4* 4.4 4.5 4.3 4.8 5.7* 5.6*   CHLORIDE mmol/L 104 101 103 104 103 104 105 106   CO2 mmol/L 22.0 27.0 25.0 25.0 26.0 24.0 23.0 23.0   BUN mg/dL 37* 34* 27* 18 13 14 18 24*   CREATININE mg/dL 4.41* 3.68* 3.03* 2.28* 1.77* 1.94* 2.41* 3.23*   GLUCOSE mg/dL 120* 110* 117* 88 107* 127* 142* 207*   MAGNESIUM mg/dL 1.9 1.7  --  1.6 1.6 1.6 1.9 1.8   PHOSPHORUS mg/dL 1.7* 1.9*  --  4.1 3.5 4.3 3.7 2.6     Radiology(recent) IR Insert Tunneled CV Catheter Without Port 5 Plus    Result Date: 11/7/2023  1. Successful placement of left IJ PermCath. Electronically Signed: Agus Perez MD  11/7/2023 11:16 AM EST  Workstation  ID: [id number]    US Liver    Result Date: 11/6/2023  Impression: No acute sonographic abnormality of the liver. Electronically Signed: Andrea Galarza MD  11/6/2023 7:43 AM EST  Workstation ID: [id number]    XR Chest 1 View    Result Date: 11/6/2023  Impression: 1. Stable lines and support tubes. 2. Negative for pneumothorax. 3. Persistent central pulmonary vascular congestion and findings suggesting mild pulmonary edema, not significantly changed from the prior study. Electronically Signed: Andrea Galarza MD  11/6/2023 7:07 AM EST  Workstation ID: [id number]     I reviewed the patient's new clinical results.    Assessment & Plan       Cardiac arrest with successful resuscitation    Marijuana abuse    Alcohol abuse    Elevated blood sugar      52 y.o. male postop day 1 from tracheostomy and PEG tube placement.  PEG tube bumper to be secured at 3 cm.  Okay to advance tube feeds to goal as patient tolerates per dietitian's recommendations.  Trach sutures can be cut out on Friday, 11/10/2023.  I will be available as needed, please call with changes questions or concerns.        This note was created using Dragon Voice Recognition software.    Rm Reeves MD  11/07/23  15:26 EST

## 2023-11-07 NOTE — PROGRESS NOTES
Pt placed on cpap/ps 8/5 at 0950. Pt RR dropped to 4 within a few mins. Pt placed back on previous settings at this time.     RN aware.

## 2023-11-07 NOTE — PROGRESS NOTES
Nephrology  Progress Note                                        [facility]    Patient Identification    Name: Yusuf Jin  Age: 52 y.o.  Sex: male  :  1970  MRN: [id number]      DATE OF SERVICE:  2023        Subective    Status post trach and PEG tube placement yesterday  Status post tunneled dialysis catheter placement today  Still on some Levophed and on the vent 40% oxygen          Objective   Scheduled Meds:chlorhexidine, 15 mL, Mouth/Throat, Q12H  levETIRAcetam, 750 mg, Intravenous, Q12H  midodrine, 2.5 mg, Oral, TID AC  pantoprazole, 40 mg, Intravenous, Q12H  piperacillin-tazobactam, 3.375 g, Intravenous, Q12H  potassium chloride, 20 mEq, Intravenous, Once  sodium chloride, 10 mL, Intravenous, Q12H  sodium chloride, 10 mL, Intravenous, Q12H  sodium chloride, 10 mL, Intravenous, Q12H          Continuous Infusions:dexmedetomidine, 0.2-1.5 mcg/kg/hr, Last Rate: 1.5 mcg/kg/hr (23)  dextrose, 50 mL/hr, Last Rate: 50 mL/hr (23)  norepinephrine, 0.02-0.5 mcg/kg/min, Last Rate: 0.02 mcg/kg/min (23)  Pharmacy Consult,   Phoxillum BK4/2.5, 2,000 mL/hr, Last Rate: 2,000 mL/hr (23)  Phoxillum BK4/2.5, 2,000 mL/hr, Last Rate: 2,000 mL/hr (23)  Phoxillum BK4/2.5, 2,000 mL/hr, Last Rate: 2,000 mL/hr (23)  PrismaSol BGK 2/3.5, 2,000 mL/hr, Last Rate: Stopped (23)  PrismaSol BGK 2/3.5, 2,000 mL/hr, Last Rate: Stopped (23)  PrismaSol BGK 2/3.5, 2,000 mL/hr, Last Rate: Stopped (11/05/23 0900)        PRN Meds:  acetaminophen **OR** acetaminophen    artificial tears    Calcium Replacement - Follow Nurse / BPA Driven Protocol    dextrose    dextrose    fentaNYL citrate (PF)    glucagon (human recombinant)    heparin (porcine)    ipratropium-albuterol    Magnesium Standard  "Dose Replacement - Follow Nurse / BPA Driven Protocol    nitroglycerin    ondansetron **OR** ondansetron    Pharmacy Consult    Phosphorus Replacement - Follow Nurse / BPA Driven Protocol    Potassium Replacement - Follow Nurse / BPA Driven Protocol    sodium chloride    sodium chloride    sodium chloride    sodium chloride    sodium chloride     Exam:  /69 (BP Location: Right arm, Patient Position: Lying)   Pulse 75   Temp 99 °F (37.2 °C) (Axillary)   Resp (!) 30   Ht 175.3 cm (69\")   Wt 84.8 kg (186 lb 15.2 oz)   SpO2 100%   BMI 27.61 kg/m²     Intake/Output last 3 shifts:  I/O last 3 completed shifts:  In: 6815 [I.V.:6455; Other:171; NG/GT:189]  Out: 3310 [Urine:160; Stool:1150]    Intake/Output this shift:  No intake/output data recorded.    Physical exam:  General Appearance: Trach vent presors    Head:  Normocephalic, without obvious abnormality, atraumatic  Eyes: conjunctiva/corneas clear     Neck:  Supple,  no adenopathy;      Lungs: Tunneled dialysis catheter noted decreased BS occasion ronchi  Heart:  Regular rate and rhythm, S1 and S2 normal  Abdomen: PEG tube noted soft, non-tender, bowel sounds active   Extremities: trace edema  Pulses: 2+ and symmetric all extremities  Skin:  No rashes or lesions       Data Review:  All labs (24hrs):   Recent Results (from the past 24 hour(s))   Hemoglobin & Hematocrit, Blood    Collection Time: 11/06/23  9:02 AM    Specimen: Blood   Result Value Ref Range    Hemoglobin 9.5 (L) 13.0 - 17.7 g/dL    Hematocrit 27.9 (L) 37.5 - 51.0 %   POC Glucose Once    Collection Time: 11/06/23  9:19 AM    Specimen: Blood   Result Value Ref Range    Glucose 111 (H) 70 - 105 mg/dL   POC Glucose Once    Collection Time: 11/06/23 11:43 AM    Specimen: Blood   Result Value Ref Range    Glucose 124 (H) 70 - 105 mg/dL   Doppler Arterial Single Level Upper Extremity - Bilateral CAR    Collection Time: 11/06/23 12:14 PM   Result Value Ref Range    Upper arterial right arm brachial " sys max 82     Upper arterial right arm radial sys max 132     Upper arterial left arm radial sys max 136     Upper arterial right arm ulnar sys max 130     Upper arterial left arm ulnar sys max 130     RIGHT WBI RATIO 1.61     LEFT WBI RATIO 1.66    Clostridioides difficile EIA - Stool, Per Rectum    Collection Time: 11/06/23 12:51 PM    Specimen: Per Rectum; Stool   Result Value Ref Range    C Diff GDH Ag Negative Negative    C.diff Toxin Ag Negative Negative   BNP    Collection Time: 11/06/23  1:18 PM    Specimen: Blood   Result Value Ref Range    proBNP 2,100.0 (H) 0.0 - 900.0 pg/mL   POC Glucose Once    Collection Time: 11/06/23  5:26 PM    Specimen: Blood   Result Value Ref Range    Glucose 120 (H) 70 - 105 mg/dL   POC Glucose Once    Collection Time: 11/07/23  1:11 AM    Specimen: Blood   Result Value Ref Range    Glucose 112 (H) 70 - 105 mg/dL   Comprehensive Metabolic Panel    Collection Time: 11/07/23  4:52 AM    Specimen: Blood   Result Value Ref Range    Glucose 120 (H) 65 - 99 mg/dL    BUN 37 (H) 6 - 20 mg/dL    Creatinine 4.41 (H) 0.76 - 1.27 mg/dL    Sodium 136 136 - 145 mmol/L    Potassium 3.8 3.5 - 5.2 mmol/L    Chloride 104 98 - 107 mmol/L    CO2 22.0 22.0 - 29.0 mmol/L    Calcium 7.7 (L) 8.6 - 10.5 mg/dL    Total Protein 5.0 (L) 6.0 - 8.5 g/dL    Albumin 2.6 (L) 3.5 - 5.2 g/dL    ALT (SGPT) 198 (H) 1 - 41 U/L    AST (SGOT) 223 (H) 1 - 40 U/L    Alkaline Phosphatase 154 (H) 39 - 117 U/L    Total Bilirubin 0.7 0.0 - 1.2 mg/dL    Globulin 2.4 gm/dL    A/G Ratio 1.1 g/dL    BUN/Creatinine Ratio 8.4 7.0 - 25.0    Anion Gap 10.0 5.0 - 15.0 mmol/L    eGFR 15.2 (L) >60.0 mL/min/1.73   CBC Auto Differential    Collection Time: 11/07/23  4:52 AM    Specimen: Blood   Result Value Ref Range    WBC 11.70 (H) 3.40 - 10.80 10*3/mm3    RBC 2.88 (L) 4.14 - 5.80 10*6/mm3    Hemoglobin 9.3 (L) 13.0 - 17.7 g/dL    Hematocrit 27.9 (L) 37.5 - 51.0 %    MCV 96.6 79.0 - 97.0 fL    MCH 32.3 26.6 - 33.0 pg    MCHC 33.4  31.5 - 35.7 g/dL    RDW 13.0 12.3 - 15.4 %    RDW-SD 43.8 37.0 - 54.0 fl    MPV 8.4 6.0 - 12.0 fL    Platelets 59 (L) 140 - 450 10*3/mm3    nRBC 0.1 0.0 - 0.2 /100 WBC          Imaging:  US Liver    Result Date: 11/6/2023  Impression: No acute sonographic abnormality of the liver. Electronically Signed: Andrea Galarza MD  11/6/2023 7:43 AM EST  Workstation ID: [id number]    XR Chest 1 View    Result Date: 11/6/2023  Impression: 1. Stable lines and support tubes. 2. Negative for pneumothorax. 3. Persistent central pulmonary vascular congestion and findings suggesting mild pulmonary edema, not significantly changed from the prior study. Electronically Signed: Andrea Galarza MD  11/6/2023 7:07 AM EST  Workstation ID: [id number]    CT Abdomen Pelvis Without Contrast    Result Date: 11/5/2023  Impression: 1. Nonspecific fluid and mixed liquid stool throughout the abdomen without signs of overt obstruction or active inflammation. Correlate for diarrheal illness. 2. Increasing patchy airspace opacities in the lower lobes suspicious for pneumonia versus aspiration. 3. Retained contrast in the right kidney suggesting acute tubular necrosis. No hydronephrosis. 4. Findings of mild volume overload/third spacing including body wall edema, presacral edema and trace ascites. No drainable collection. 5. Other ancillary findings as above. Electronically Signed: Jermaine Heath MD  11/5/2023 11:15 AM EST  Workstation ID: [id number]    MRI Brain Without Contrast    Result Date: 11/5/2023  Impression: MRI findings suggesting diffuse anoxic brain injury. Electronically Signed: Jermaine Heath MD  11/5/2023 10:57 AM EST  Workstation ID: [id number]    XR Chest 1 View    Result Date: 11/5/2023  Impression: 1. Improving vascular congestion and pulmonary edema. 2. Support devices as above, including high ET tube 8.7 cm above andry similar in position to prior exam. Electronically Signed: Jermaine Heath MD  11/5/2023 8:33 AM EST  Workstation ID:  [id number]    XR Chest 1 View    Result Date: 11/4/2023  Impression: Appropriately placed right internal jugular CVC catheter with the tip in the mid SVC. No pneumothorax. Mild pulmonary edema pattern, improved as compared to the previous study. Electronically Signed: Caitlyn Urbina MD  11/4/2023 12:54 AM EDT  Workstation ID: [id number]    XR Chest 1 View    Result Date: 11/3/2023  Impression: Unchanged position of right PICC. Right subclavian dual-lumen venous catheter with the distal tip at the upper to mid SVC. Adequate position of endotracheal and enteric tube. Mildly improved patchy opacities throughout the lungs which may represent true improvement versus technical differences Electronically Signed: Ellis Gibson DO  11/3/2023 5:07 PM EDT  Workstation ID: [id number]    XR Abdomen KUB    Result Date: 11/3/2023  Impression: Feeding tube terminates in the stomach. Electronically Signed: Grey Newton MD  11/3/2023 3:21 AM EDT  Workstation ID: [id number]    XR Chest 1 View    Result Date: 11/3/2023  Impression: 1.Worsening diffuse bilateral airspace disease. 2.Endotracheal tube tip 4.6 cm above the andry. 3.Right upper extremity PICC terminates in the right brachiocephalic vein. Electronically Signed: Grey Newton MD  11/3/2023 3:21 AM EDT  Workstation ID: [id number]    CT Angiogram Chest Pulmonary Embolism    Result Date: 11/3/2023  Impression: 1.Multifocal pneumonia. 2.No evidence of pulmonary embolism. Electronically Signed: Grey Newton MD  11/3/2023 12:22 AM EDT  Workstation ID: [id number]    CT Head Without Contrast    Result Date: 11/3/2023  Impression: 1.Gray-white differentiation is present, but poor throughout the brain and cerebellum. This could be related to anoxic brain injury. This could be further evaluated with follow-up brain MRI or serial CT as indicated. 2.No acute intracranial hemorrhage. Electronically Signed: Grey Newton MD  11/3/2023 12:20 AM EDT  Workstation ID: [id number]    XR Chest 1  View    Result Date: 11/2/2023  Impression: 1.ET tube tip 5.3 cm above the andry. 2.Right apical airspace disease. Electronically Signed: Grey Newton MD  11/2/2023 11:37 PM EDT  Workstation ID: [id number]     Assessment/Plan:     Cardiac arrest with successful resuscitation    Marijuana abuse    Alcohol abuse    Elevated blood sugar       Acute kidney injury   Severe metabolic/ respiratory acidosis   Cardiopulmonary arrest   ARDS   Possible anoxic brain injury   Hyperglycemia   Elevated troponin   Hypokalemia   Hypernatremia   Lactic acidosis  Recommendations:  Dialysis tomorrow using tunneled dialysis catheter  Still anuric  No need for dialysis today

## 2023-11-07 NOTE — PROGRESS NOTES
Nutrition Services    Patient Name: Yusuf Jin  YOB: 1970  MRN: [id number]  Admission date: 11/2/2023    PPE Documentation        PPE Worn By Provider Did not enter room for this encounter   PPE Worn By Patient  N/A     PROGRESS NOTE      Encounter Information: Checking in on nutrition plan of care.  Patient discussed in AM rounds.  S/P trach/PEG 11/6.  HF yesterday with 2L fluid removal.         PO Diet: NPO Diet NPO Type: Strict NPO   PO Supplements: None ordered    PO Intake:  NPO       Current nutrition support: Novasource Renal at 10 mL/hour + 10 mL/hour water flush    Nutrition support review: Documented as above per EMR        Labs (reviewed below): Hypokalemia - resolved   Hypophosphatemia - to replace today per RN       GI Function:  FMS in place, 150 mL output last 24 hours   Residuals WNL (last check 350 mL per RN via Secure Chat)       Nutrition Intervention Updates: Gradual advancement in tube feeding to 20 mL/hour     Continue water flush of 10 mL/hour        Results from last 7 days   Lab Units 11/07/23 0452 11/06/23 0427 11/05/23 2001 11/05/23  0756 11/05/23  0030   SODIUM mmol/L 136 137 138   < > 140   POTASSIUM mmol/L 3.8 3.4* 4.4   < > 4.8   CHLORIDE mmol/L 104 101 103   < > 104   CO2 mmol/L 22.0 27.0 25.0   < > 24.0   BUN mg/dL 37* 34* 27*   < > 14   CREATININE mg/dL 4.41* 3.68* 3.03*   < > 1.94*   CALCIUM mg/dL 7.7* 7.9* 8.2*   < > 8.7   BILIRUBIN mg/dL 0.7 0.7  --   --  1.0   ALK PHOS U/L 154* 68  --   --  45   ALT (SGPT) U/L 198* 290*  --   --  385*   AST (SGOT) U/L 223* 380*  --   --  632*   GLUCOSE mg/dL 120* 110* 117*   < > 127*    < > = values in this interval not displayed.     Results from last 7 days   Lab Units 11/07/23 0452 11/06/23  0902 11/06/23 0427 11/05/23  1609 11/05/23  1202 11/03/23  1026 11/03/23  0536   MAGNESIUM mg/dL 1.9  --  1.7  --  1.6   < > 1.9   PHOSPHORUS mg/dL 1.7*  --  1.9*  --  4.1   < > 4.0   HEMOGLOBIN g/dL 9.3*   < > 9.3*   < >  --     < > 16.1   HEMATOCRIT % 27.9*   < > 27.1*   < >  --    < > 47.6   TRIGLYCERIDES mg/dL  --   --   --   --   --   --  90    < > = values in this interval not displayed.     COVID19   Date Value Ref Range Status   11/03/2023 Not Detected Not Detected - Ref. Range Final     Lab Results   Component Value Date    HGBA1C 5.10 11/03/2023       RD to follow up per protocol.    Electronically signed by:  Cassy Donovan RD  11/07/23 08:42 EST

## 2023-11-07 NOTE — PROGRESS NOTES
Critical Care Progress Note   Yusuf Jin : 1970 MRN:[id number] LOS:4     Principal Problem: Cardiac arrest with successful resuscitation     Reason for follow up: All the medical problems listed below    Summary     A 52 y.o. male with PMH of marijuana abuse and alcoholism presented to the hospital after a witnessed cardiac arrest and was admitted with a principal diagnosis of Cardiac arrest with successful resuscitation. Apparently, his marijuana was tested positive for fentanyl on a home kit, done by his sister.  Initial rhythm of asystole, unknown duration of CPR by EMS, multiple rounds of CPR in ED.  Intubated in the ED for hypoxic respiratory failure.   CT angio showed multifocal pneumonia.  CT head on admission with poor gray-white differentiation.  MRI brain on  showed diffuse anoxic brain injury.  Neurology was consulted and deemed poor prognosis.    On admission, he also went into full-blown septic shock, treated with broad-spectrum antibiotics and needed multiple vasopressors.  Also had CONOR with metabolic acidosis, treated with bicarb drip and nephrology was consulted.  Eventually needed CRRT. Subsequently, also developed right hand ischemia due to radial artery dissection, vascular surgery was consulted and recommended conservative management.    Palliative care consulted for goals of care discussion.  Family opted for trach/PEG and LTAC placement.    Significant events     23 : Remove right IJ and subclavian central line and Shepherd catheter.  Consult IR for tunneled HD catheter placement.    Assessment / Plan     Cardiac arrest  Presumed to be from substance abuse.  Echocardiogram with LVEF of 26 to 30%.      Cardiogenic shock  Off vasopressors.  Possibility of septic shock cannot be ruled out.  CT abdomen suspicious for enterocolitis.  Continue with Zosyn.  Stool for C. difficile was negative.    Acute Systolic heart failure:   Currently decompensated.   Last ECHO showed an EF  of 26-30%.   Unclear etiology. Holding off on any ischemic evaluation at this time until mentation improved.  Monitor Input/Output very closely. Follow daily weights.   Net IO Since Admission: 8,597.72 mL [11/07/23 1016]    Acute respiratory failure with hypoxia and hypercapnia:   Likely due to pneumonia.  Ventilator settings noted and adjusted as needed.  We will attempt a spontaneous breathing trial today.  Minimize sedation/analgesia to keep RASS of 0 to -1.    Acute Kidney Injury:   Remains anuric.   Likely ATN from shock state.    Nephrology following, scheduled for tunneled HD catheter placement today.  Monitor Input/Output very closely.   Net IO Since Admission: 8,597.72 mL [11/07/23 1016]     Anoxic brain injury  MRI on 11/5 showed diffuse global anoxic brain injury.    Neurology following, deemed poor long-term prognosis.  On empiric Keppra per neurology.  On Precedex for restlessness, add low-dose Xanax with a goal of weaning of Precedex.    Right radial artery dissection  Vascular surgery following, recommended conservative management.    Acute thrombocytopenia  Unclear etiology.  HIT antibody negative.  Some component of hemodilution is possible given that he is in 7.5 L positive fluid balance.  Monitor platelets closely.  Normal INR, DIC ruled out.    Normocytic anemia: Likely from hemodilution, hemoglobin dropped from 13.6 -> 9.5 and remained stable since then.    Hypoglycemia: Advance tube feeds and likely can DC D10 drip.    Coagulase-negative staph bacteremia: Likely contaminant, no further work-up needed.  DC vancomycin.    Transaminitis/shock liver: Liver enzymes improving.    Code status:   Level Of Support Discussed With: Next of Kin (If No Surrogate)  Code Status (Patient has no pulse and is not breathing): CPR (Attempt to Resuscitate)  Medical Interventions (Patient has pulse or is breathing): Full Support       Nutrition: NPO Diet NPO Type: Strict NPO   Diet, Tube Feeding Tube Feeding  Formula: Novasource Renal (Nepro); Tube Feeding Type: Continuous; Continuous Tube Feeding Start Rate (mL/hr): 10; Then Advance Rate By (mL/hr): Do Not Advance; Every __ Hours: Patient at Goal Rate; To Goal Rate of (...    DVT prophylaxis:  Medical and mechanical DVT prophylaxis orders are present.     Subjective / Review of systems     Review of Systems   Intubated and obtunded  Objective / Physical Exam   Vital signs:  Temp: 99.3 °F (37.4 °C)  BP: 154/93  Heart Rate: 72  Resp: (!) 30  SpO2: 100 %  Weight: 84.8 kg (186 lb 15.2 oz)    Admission Weight: Weight: 72 kg (158 lb 11.7 oz)  Current Weight: Weight: 84.8 kg (186 lb 15.2 oz)    Input/Output in last 24 hours:    Intake/Output Summary (Last 24 hours) at 11/7/2023 1016  Last data filed at 11/7/2023 0600  Gross per 24 hour   Intake 3187 ml   Output 2250 ml   Net 937 ml      Physical Exam  Vitals and nursing note reviewed.   Constitutional:       Appearance: Normal appearance.      Interventions: He is intubated.   HENT:      Head: Normocephalic and atraumatic.      Mouth/Throat:      Mouth: Mucous membranes are moist.      Comments: ET tube in place  Eyes:      General: No scleral icterus.     Conjunctiva/sclera: Conjunctivae normal.   Cardiovascular:      Rate and Rhythm: Normal rate.      Pulses: Normal pulses.      Heart sounds: No murmur heard.     No gallop.   Pulmonary:      Effort: Pulmonary effort is normal. He is intubated.      Breath sounds: Rales (Bibasilar) present. No wheezing.   Abdominal:      General: Bowel sounds are normal.      Palpations: Abdomen is soft.      Tenderness: There is no abdominal tenderness.   Musculoskeletal:      Right lower leg: No edema.      Left lower leg: No edema.   Skin:     Comments: Right IJ central line, right subclavian central line, right femoral HD catheter    Improved warmth on right hand   Neurological:      Comments: Obtunded        Radiology and Labs     Results from last 7 days   Lab Units 11/07/23  3005  11/06/23  0902 11/06/23  0427 11/05/23  2342 11/05/23  1609 11/05/23  0756 11/05/23  0030 11/04/23  0831 11/04/23  0335   WBC 10*3/mm3 11.70*  --  8.40  --  8.10  --  10.70  --  6.40   HEMATOCRIT % 27.9* 27.9* 27.1* 27.4* 28.3*   < > 34.8*   < > 42.7   PLATELETS 10*3/mm3 59*  --  53*  --  54*  --  81*  --  133*    < > = values in this interval not displayed.      Results from last 7 days   Lab Units 11/07/23  0452 11/06/23  0427 11/05/23 2001 11/05/23  1202 11/05/23  0756   SODIUM mmol/L 136 137 138 139 137   POTASSIUM mmol/L 3.8 3.4* 4.4 4.5 4.3   CHLORIDE mmol/L 104 101 103 104 103   CO2 mmol/L 22.0 27.0 25.0 25.0 26.0   BUN mg/dL 37* 34* 27* 18 13   CREATININE mg/dL 4.41* 3.68* 3.03* 2.28* 1.77*      Current medications   Scheduled Meds: ALPRAZolam, 0.5 mg, Oral, TID  chlorhexidine, 15 mL, Mouth/Throat, Q12H  lansoprazole, 15 mg, Per G Tube, Daily  levETIRAcetam, 750 mg, Intravenous, Q12H  midodrine, 5 mg, Oral, TID AC  piperacillin-tazobactam, 3.375 g, Intravenous, Q12H  sodium chloride, 10 mL, Intravenous, Q12H  sodium chloride, 10 mL, Intravenous, Q12H  sodium chloride, 10 mL, Intravenous, Q12H      Continuous Infusions: dexmedetomidine, 0.2-1.5 mcg/kg/hr, Last Rate: 1 mcg/kg/hr (11/07/23 0943)  dextrose, 25 mL/hr, Last Rate: 25 mL/hr (11/07/23 0943)  norepinephrine, 0.02-0.5 mcg/kg/min, Last Rate: 0.02 mcg/kg/min (11/07/23 0950)  Pharmacy Consult,   Phoxillum BK4/2.5, 2,000 mL/hr, Last Rate: Stopped (11/07/23 0700)  Phoxillum BK4/2.5, 2,000 mL/hr, Last Rate: Stopped (11/07/23 0700)  Phoxillum BK4/2.5, 2,000 mL/hr, Last Rate: Stopped (11/07/23 0700)  PrismaSol BGK 2/3.5, 2,000 mL/hr, Last Rate: Stopped (11/05/23 0900)  PrismaSol BGK 2/3.5, 2,000 mL/hr, Last Rate: Stopped (11/05/23 0900)  PrismaSol BGK 2/3.5, 2,000 mL/hr, Last Rate: Stopped (11/05/23 0900)      Plan discussed with RN. Reviewed all other data in the last 24 hours, including but not limited to vitals, labs, microbiology, imaging and pertinent  notes from other providers. High complexity decision making and high risk of deterioration.    Tommy Mendieta MD   Critical Care  11/07/23   10:16 EST

## 2023-11-08 NOTE — NURSING NOTE
Dialysis complete, blood pressure was very laible during the entire treatment using levophed and increasing and decreasing the u.f. goal, only able to remove 200 cc with this treatment.  No other complications noted.  Pre and post vitals are in epic.

## 2023-11-08 NOTE — PROGRESS NOTES
Nutrition Services    Patient Name: Yusuf Jin  YOB: 1970  MRN: [id number]  Admission date: 11/2/2023    PPE Documentation        PPE Worn By Provider Did not enter room for this encounter   PPE Worn By Patient  N/A     PROGRESS NOTE      Encounter Information: Checking in on nutrition plan of care.  Patient discussed in AM rounds.  TF held overnight related to elevated residuals.  MD desires to discard current residuals and then restart TF at trickle rate.  Noted with electrolyte disturbances, RD concerned about refeeding syndrome.  TF has been held for at least 13 hours per EMR and Phos and K+ being replaced today.  Restart TF cautiously and monitor Phos and K+ rechecks.            PO Diet: NPO Diet NPO Type: Strict NPO   PO Supplements: None ordered    PO Intake:  NPO       Current nutrition support: Novasource Renal at 20 mL/hour + 10 mL/hour water flush    Nutrition support review: Paused overnight related to residuals, to restart today        Labs (reviewed below): Hypokalemia - replaced today   Hypophosphatemia - replaced today        GI Function:  FMS in place, 50 mL output last 24 hours   Residuals previously WNL (gradually rising with 350 mL, 400 mL and 425 mL)       Nutrition Intervention Updates: No increase in tube feeding related to electrolyte imbalances.    Restart TF at 20 mL/hour per MD    Continue 10 mL/hour water flush        Results from last 7 days   Lab Units 11/08/23 0443 11/07/23 0452 11/06/23  0427   SODIUM mmol/L 140 136 137   POTASSIUM mmol/L 2.8* 3.8 3.4*   CHLORIDE mmol/L 107 104 101   CO2 mmol/L 21.0* 22.0 27.0   BUN mg/dL 50* 37* 34*   CREATININE mg/dL 6.19* 4.41* 3.68*   CALCIUM mg/dL 7.7* 7.7* 7.9*   BILIRUBIN mg/dL 0.6 0.7 0.7   ALK PHOS U/L 108 154* 68   ALT (SGPT) U/L 115* 198* 290*   AST (SGOT) U/L 106* 223* 380*   GLUCOSE mg/dL 111* 120* 110*     Results from last 7 days   Lab Units 11/08/23 0443 11/07/23  1555 11/07/23  0452 11/06/23  0902  11/06/23  0427 11/03/23  1026 11/03/23  0536   MAGNESIUM mg/dL 2.0  --  1.9  --  1.7   < > 1.9   PHOSPHORUS mg/dL 0.9*   < > 1.7*  --  1.9*   < > 4.0   HEMOGLOBIN g/dL 8.3*  --  9.3*   < > 9.3*   < > 16.1   HEMATOCRIT % 24.6*  --  27.9*   < > 27.1*   < > 47.6   TRIGLYCERIDES mg/dL  --   --   --   --   --   --  90    < > = values in this interval not displayed.     COVID19   Date Value Ref Range Status   11/03/2023 Not Detected Not Detected - Ref. Range Final     Lab Results   Component Value Date    HGBA1C 5.10 11/03/2023       RD to follow up per protocol.    Electronically signed by:  Cassy Donovan, ELBA  11/08/23 08:03 EST

## 2023-11-08 NOTE — CASE MANAGEMENT/SOCIAL WORK
"Social Work Assessment  [facility]     Patient Name: Yusuf Jin  MRN: [id number]  Today's Date: 11/8/2023    Admit Date: 11/2/2023   Demographic Summary       Row Name 11/08/23 1430       General Information    Reason for Consult decision-making    General Information Comments Sw consulted due to questions on who Next of Kin are for Patient. Pt's NOK and primary healthcare decision makers are his adult children, Tamiko Jin ([phone]) and Yusuf Interiano ([phone]). Per HB 1119 : \"if an adult incapable of consenting has not appointed a health care representative/power of  for healthcare or the healthcare representative/POA is not available or declines to act, consent to health care may be given in the following order of priority:  1. Court appointed Guardian 2. A Spouse 3. An adult child (or majority of adult children) 4. A Parent 5. An adult sibling (or majority of adult siblings) 6. A grandparent (or majority of grandparents) 7. An adult grandchild (or majority of grandchildren) 8. The nearest other adult relative in the next degree of kinship who is not listed above. 9. An adult friend who has maintained regular contact with the individual and is familiar with the individual activities, health and Yazidi or moral beliefs. 10. The individual's Yazidi superior if the individual is a member of a Yazidi order. *If there are multiple members at the same priority level, then the majority of available individuals controls.           WILL Terry, MSW    Phone: [phone]  Fax: [phone]  Email: [email or web address]     "

## 2023-11-08 NOTE — CASE MANAGEMENT/SOCIAL WORK
Continued Stay Note   [facility]     Patient Name: Yusuf Jin  MRN: [id number]  Today's Date: 11/7/2023    Admit Date: 11/2/2023    Plan: Dc Plan: [facility] accepted and following. Precert will be required. No PASRR required. Trach/PEG 11/6/2023. Will require 3 days of weaning documentation post Trach before precert can be started. Must have Permanent HD cath placed prior to DC.   Discharge Plan       Row Name 11/07/23 2035       Plan    Plan Dc Plan: [facility] accepted and following. Precert will be required. No PASRR required. Trach/PEG 11/6/2023. Will require 3 days of weaning documentation post Trach before precert can be started. Must have Permanent HD cath placed prior to DC.    Provided Post Acute Provider List? N/A    Provided Post Acute Provider Quality & Resource List? N/A    Plan Comments CM rounded with Intensivist, NP, nursing, and multidisciplinary team to obtain clinical updates this morning. Patient had successful tracheostomy and PEG tube placement. Weaning trials to be documented per RT. Had Tunneled HD cath placement today after platelet transfusion. CM spoke with patient daughter to address questions regarding making patient a Do not publish and other questions that would require an attorneys assistance. Guidance provided and daughter verbalized understanding.Family also inquiring about Antifreeze in patient system and if that should be reported to police. CM had not yet been informed of this and stated I would follow up with Intensivist. SAMREEN spoke with Dr. Mendieta who followed up and later sent CM a secure chat stating that testing for this substance was negative. CM will follow up with family to clarify results. DC Barriers: Vent trach with 3 days documented weaning trials required to start precert, arterial line, PEG tube, FMS, Precedex gtt, D10 gtt, and abnormal labs.               Expected Discharge Date and Time       Expected Discharge  Date Expected Discharge Time    Nov 13, 2023               Pauline Rm, IBAN     Office Phone: [phone]  Office Cell:     [phone]

## 2023-11-08 NOTE — CASE MANAGEMENT/SOCIAL WORK
Continued Stay Note   [facility]     Patient Name: Yusuf Jin  MRN: [id number]  Today's Date: 11/8/2023    Admit Date: 11/2/2023    Plan: Dc Plan: [facility] accepted and following. Precert will be required. No PASRR required. Trach/PEG 11/6/2023. Will require 3 days of weaning documentation post Trach before precert can be started. Must have Permanent HD cath placed prior to DC.   Discharge Plan       Row Name 11/08/23 1316       Plan    Plan Dc Plan: [facility] accepted and following. Precert will be required. No PASRR required. Trach/PEG 11/6/2023. Will require 3 days of weaning documentation post Trach before precert can be started. Must have Permanent HD cath placed prior to DC.    Patient/Family in Agreement with Plan yes    Provided Post Acute Provider List? N/A    Provided Post Acute Provider Quality & Resource List? N/A    Plan Comments CM spoke with intensivist, nursing, and NP to obtain clinical upates in morning rounds. Patient failed weaning trial this morning per RT report. Central line to remain in place secondary to IV pressor. Nursing reports patient not tolerating tube feeding. Plan for HD today.CM will continue to follow for any further needs and adjust discharge plan accordingly. DC Barriers: Vent/Trach, central line, arterial line, HD cath, FMS, D10 gtt, Levophed gtt, IV abx, and monitor labs.               Expected Discharge Date and Time       Expected Discharge Date Expected Discharge Time    Nov 13, 2023               Pauline Rm RN    Office Phone: [phone]  Office Cell:     [phone]

## 2023-11-08 NOTE — PROGRESS NOTES
Critical Care Progress Note   Yusuf Jin : 1970 MRN:[id number] LOS:5     Principal Problem: Cardiac arrest with successful resuscitation     Reason for follow up: All the medical problems listed below    Summary     A 52 y.o. male with PMH of marijuana abuse and alcoholism presented to the hospital after a witnessed cardiac arrest and was admitted with a principal diagnosis of Cardiac arrest with successful resuscitation. Apparently, his marijuana was tested positive for fentanyl on a home kit, done by his sister.  Initial rhythm of asystole, unknown duration of CPR by EMS, multiple rounds of CPR in ED.  Intubated in the ED for hypoxic respiratory failure.   CT angio showed multifocal pneumonia.  CT head on admission with poor gray-white differentiation.  MRI brain on  showed diffuse anoxic brain injury.  Neurology was consulted and deemed poor prognosis.    On admission, he also went into full-blown septic shock, treated with broad-spectrum antibiotics and needed multiple vasopressors.  Also had CONOR with metabolic acidosis, treated with bicarb drip and nephrology was consulted.  Eventually needed CRRT. Subsequently, also developed right hand ischemia due to radial artery dissection, vascular surgery was consulted and recommended conservative management.    Palliative care consulted for goals of care discussion.  Family opted for trach/PEG and LTAC placement.  Patient is being worked up for LTACH.    Significant events     23 : Central line in place continues due to Levophed gtt.  HD planned for today.  Precedex off, Xanax changed to PRN.  Nothing purposeful on examination.  Patient attempted SBP trial this AM and became tachypneic and hypoxic requiring to be changed back to rate controlled setting, this is the second failed SBP.      Assessment / Plan     Cardiac arrest  Presumed to be from substance abuse.  Echocardiogram with LVEF of 26 to 30%.      Cardiogenic shock  Off  vasopressors.  Possibility of septic shock cannot be ruled out.  CT abdomen suspicious for enterocolitis.  Continue with Zosyn.  Stool for C. difficile was negative.    Acute Systolic heart failure:   Currently decompensated.   Last ECHO showed an EF of 26-30%.   Unclear etiology. Holding off on any ischemic evaluation at this time until mentation improved.  Monitor Input/Output very closely. Follow daily weights.   Net IO Since Admission: 10,452.15 mL [11/08/23 1035]    Acute respiratory failure with hypoxia and hypercapnia:   Likely due to pneumonia.  Ventilator settings noted and adjusted as needed.  We will attempt a spontaneous breathing trial today.  Minimize sedation/analgesia to keep RASS of 0 to -1.    Acute Kidney Injury:   Remains anuric.   Likely ATN from shock state.    Nephrology following, scheduled for tunneled HD catheter placement today.  Monitor Input/Output very closely.   Net IO Since Admission: 10,452.15 mL [11/08/23 1035]     Anoxic brain injury  MRI on 11/5 showed diffuse global anoxic brain injury.    Neurology following, deemed poor long-term prognosis.  On empiric Keppra per neurology, stopping Keppra and will monitor for seizures.  Precedex off, Xanax changed to PRN.    Right radial artery dissection  Vascular surgery following, recommended conservative management.    Acute thrombocytopenia  Unclear etiology.  HIT antibody negative.  Some component of hemodilution is possible given that he is in 7.5 L positive fluid balance.  Monitor platelets closely.  Normal INR, DIC ruled out.    Normocytic anemia: Likely from hemodilution, hemoglobin dropped from 13.6 -> 9.5 and remained stable since then.    Hypoglycemia: Advance tube feeds and likely can DC D10 drip.    Coagulase-negative staph bacteremia: Likely contaminant, no further work-up needed.  DC vancomycin.    Transaminitis/shock liver: Liver enzymes improving.    Code status:   Level Of Support Discussed With: Next of Kin (If No  Surrogate)  Code Status (Patient has no pulse and is not breathing): CPR (Attempt to Resuscitate)  Medical Interventions (Patient has pulse or is breathing): Full Support       Nutrition: NPO Diet NPO Type: Strict NPO   Diet, Tube Feeding Tube Feeding Formula: Novasource Renal (Nepro); Tube Feeding Type: Continuous; Continuous Tube Feeding Start Rate (mL/hr): 20; Then Advance Rate By (mL/hr): Do Not Advance; Every __ Hours: Patient at Goal Rate; To Goal Rate of (...    DVT prophylaxis:  Mechanical DVT prophylaxis orders are present.     Subjective / Review of systems     Review of Systems   Intubated and obtunded  Objective / Physical Exam   Vital signs:  Temp: 97.4 °F (36.3 °C)  BP: 107/65  Heart Rate: 70  Resp: (!) 30  SpO2: 100 %  Weight: 89.3 kg (196 lb 13.9 oz)    Admission Weight: Weight: 72 kg (158 lb 11.7 oz)  Current Weight: Weight: 89.3 kg (196 lb 13.9 oz)    Input/Output in last 24 hours:    Intake/Output Summary (Last 24 hours) at 11/8/2023 1035  Last data filed at 11/8/2023 0600  Gross per 24 hour   Intake 1672.43 ml   Output 50 ml   Net 1622.43 ml      Physical Exam  Vitals and nursing note reviewed.   Constitutional:       Appearance: Normal appearance.      Interventions: He is intubated.   HENT:      Head: Normocephalic and atraumatic.      Mouth/Throat:      Mouth: Mucous membranes are moist.      Pharynx: Oropharynx is clear. No oropharyngeal exudate.      Comments: ET tube in place  Eyes:      General: No scleral icterus.     Conjunctiva/sclera: Conjunctivae normal.   Neck:      Comments: Tracheostomy in place.  Cardiovascular:      Rate and Rhythm: Normal rate.      Pulses: Normal pulses.      Heart sounds: No murmur heard.     No gallop.   Pulmonary:      Effort: Pulmonary effort is normal. He is intubated.      Breath sounds: Rales (Bibasilar) present. No wheezing.   Abdominal:      General: Bowel sounds are normal.      Palpations: Abdomen is soft.      Tenderness: There is no abdominal  tenderness.      Comments: PEG tube in place.   Musculoskeletal:      Right lower leg: No edema.      Left lower leg: No edema.   Skin:     Comments: Right IJ central line, RIJ tunneled catheter    Improved warmth on right hand.   Neurological:      Comments: Obtunded        Radiology and Labs     Results from last 7 days   Lab Units 11/08/23 0443 11/07/23 0452 11/06/23  0902 11/06/23  0427 11/05/23  2342 11/05/23  1609 11/05/23  0756 11/05/23  0030   WBC 10*3/mm3 14.80* 11.70*  --  8.40  --  8.10  --  10.70   HEMATOCRIT % 24.6* 27.9* 27.9* 27.1* 27.4* 28.3*   < > 34.8*   PLATELETS 10*3/mm3 75* 59*  --  53*  --  54*  --  81*    < > = values in this interval not displayed.      Results from last 7 days   Lab Units 11/08/23 0443 11/07/23 0452 11/06/23 0427 11/05/23  2001 11/05/23  1202   SODIUM mmol/L 140 136 137 138 139   POTASSIUM mmol/L 2.8* 3.8 3.4* 4.4 4.5   CHLORIDE mmol/L 107 104 101 103 104   CO2 mmol/L 21.0* 22.0 27.0 25.0 25.0   BUN mg/dL 50* 37* 34* 27* 18   CREATININE mg/dL 6.19* 4.41* 3.68* 3.03* 2.28*      Current medications     Scheduled Meds:   chlorhexidine, 15 mL, Mouth/Throat, Q12H  lansoprazole, 30 mg, Per G Tube, Daily  midodrine, 10 mg, Per G Tube, Q8H  piperacillin-tazobactam, 3.375 g, Intravenous, Q12H  potassium phosphate, 15 mmol, Intravenous, Once  senna-docusate sodium, 2 tablet, Nasogastric, BID  sodium chloride, 10 mL, Intravenous, Q12H  sodium chloride, 10 mL, Intravenous, Q12H  sodium chloride, 10 mL, Intravenous, Q12H      Continuous Infusions:   dextrose, 25 mL/hr, Last Rate: 25 mL/hr (11/07/23 0943)  norepinephrine, 0.02-0.5 mcg/kg/min, Last Rate: 0.06 mcg/kg/min (11/08/23 0113)  Pharmacy Consult,       Plan discussed with RN. Reviewed all other data in the last 24 hours, including but not limited to vitals, labs, microbiology, imaging and pertinent notes from other providers. High complexity decision making and high risk of deterioration.    Jed Evans, APRN   Critical  Care  11/08/23   10:35 EST

## 2023-11-08 NOTE — PROGRESS NOTES
"                                                                                                                                      Nephrology  Progress Note                                        [facility]    Patient Identification    Name: Yusuf Jin  Age: 52 y.o.  Sex: male  :  1970  MRN: [id number]      DATE OF SERVICE:  2023        Subective    On the vent unresponsive  On Levophed       Objective   Scheduled Meds:ALPRAZolam, 0.5 mg, Per G Tube, TID  chlorhexidine, 15 mL, Mouth/Throat, Q12H  lansoprazole, 30 mg, Per G Tube, Daily  levETIRAcetam, 750 mg, Intravenous, Q12H  midodrine, 5 mg, Per G Tube, Q8H  piperacillin-tazobactam, 3.375 g, Intravenous, Q12H  potassium chloride, 20 mEq, Intravenous, Q1H  potassium phosphate, 15 mmol, Intravenous, Once  senna-docusate sodium, 2 tablet, Oral, BID  sodium chloride, 10 mL, Intravenous, Q12H  sodium chloride, 10 mL, Intravenous, Q12H  sodium chloride, 10 mL, Intravenous, Q12H          Continuous Infusions:dexmedetomidine, 0.2-1.5 mcg/kg/hr, Last Rate: Stopped (23 1130)  dextrose, 25 mL/hr, Last Rate: 25 mL/hr (23 0943)  norepinephrine, 0.02-0.5 mcg/kg/min, Last Rate: 0.06 mcg/kg/min (23 0113)  Pharmacy Consult,         PRN Meds:  acetaminophen **OR** acetaminophen    artificial tears    senna-docusate sodium **AND** polyethylene glycol **AND** bisacodyl **AND** bisacodyl    dextrose    dextrose    fentaNYL citrate (PF)    glucagon (human recombinant)    heparin (porcine)    ipratropium-albuterol    Magnesium Standard Dose Replacement - Follow Nurse / BPA Driven Protocol    nitroglycerin    ondansetron **OR** ondansetron    Pharmacy Consult    sodium chloride    sodium chloride    sodium chloride    sodium chloride    sodium chloride     Exam:  /60   Pulse 72   Temp 98.9 °F (37.2 °C) (Axillary)   Resp (!) 30   Ht 175.3 cm (69\")   Wt 89.3 kg (196 lb 13.9 oz)   SpO2 100%   BMI 29.07 kg/m² "     Intake/Output last 3 shifts:  I/O last 3 completed shifts:  In: 3868.4 [I.V.:2913.4; Blood:237; Other:312; NG/GT:406]  Out: 205 [Urine:55; Stool:150]    Intake/Output this shift:  No intake/output data recorded.    Physical exam:  General Appearance: Trach vent presors    Head:  Normocephalic, without obvious abnormality, atraumatic  Eyes: conjunctiva/corneas clear     Neck:  Supple,  no adenopathy;      Lungs: Tunneled dialysis catheter noted decreased BS occasion ronchi  Heart:  Regular rate and rhythm, S1 and S2 normal  Abdomen: PEG tube noted soft, non-tender, bowel sounds active   Extremities: trace edema  Pulses: 2+ and symmetric all extremities  Skin:  No rashes or lesions       Data Review:  All labs (24hrs):   Recent Results (from the past 24 hour(s))   POC Glucose Once    Collection Time: 11/07/23  7:27 AM    Specimen: Blood   Result Value Ref Range    Glucose 123 (H) 70 - 105 mg/dL   POC Glucose Once    Collection Time: 11/07/23 12:02 PM    Specimen: Blood   Result Value Ref Range    Glucose 117 (H) 70 - 105 mg/dL   Phosphorus    Collection Time: 11/07/23  3:55 PM    Specimen: Blood   Result Value Ref Range    Phosphorus 0.8 (C) 2.5 - 4.5 mg/dL   POC Glucose Once    Collection Time: 11/08/23  1:08 AM    Specimen: Blood   Result Value Ref Range    Glucose 111 (H) 70 - 105 mg/dL   Prepare Platelet Pheresis, 1 Units    Collection Time: 11/08/23  2:00 AM   Result Value Ref Range    Product Code G1561M35     Unit Number [id number]     UNIT  ABO A     UNIT  RH POS     Dispense Status PT     Blood Expiration Date [id number]     Blood Type Barcode 6200    Comprehensive Metabolic Panel    Collection Time: 11/08/23  4:43 AM    Specimen: Blood   Result Value Ref Range    Glucose 111 (H) 65 - 99 mg/dL    BUN 50 (H) 6 - 20 mg/dL    Creatinine 6.19 (H) 0.76 - 1.27 mg/dL    Sodium 140 136 - 145 mmol/L    Potassium 2.8 (L) 3.5 - 5.2 mmol/L    Chloride 107 98 - 107 mmol/L    CO2 21.0 (L) 22.0 - 29.0 mmol/L     Calcium 7.7 (L) 8.6 - 10.5 mg/dL    Total Protein 4.7 (L) 6.0 - 8.5 g/dL    Albumin 2.4 (L) 3.5 - 5.2 g/dL    ALT (SGPT) 115 (H) 1 - 41 U/L    AST (SGOT) 106 (H) 1 - 40 U/L    Alkaline Phosphatase 108 39 - 117 U/L    Total Bilirubin 0.6 0.0 - 1.2 mg/dL    Globulin 2.3 gm/dL    A/G Ratio 1.0 g/dL    BUN/Creatinine Ratio 8.1 7.0 - 25.0    Anion Gap 12.0 5.0 - 15.0 mmol/L    eGFR 10.1 (L) >60.0 mL/min/1.73   Phosphorus    Collection Time: 11/08/23  4:43 AM    Specimen: Blood   Result Value Ref Range    Phosphorus 0.9 (C) 2.5 - 4.5 mg/dL   CBC Auto Differential    Collection Time: 11/08/23  4:43 AM    Specimen: Blood   Result Value Ref Range    WBC 14.80 (H) 3.40 - 10.80 10*3/mm3    RBC 2.61 (L) 4.14 - 5.80 10*6/mm3    Hemoglobin 8.3 (L) 13.0 - 17.7 g/dL    Hematocrit 24.6 (L) 37.5 - 51.0 %    MCV 94.3 79.0 - 97.0 fL    MCH 31.9 26.6 - 33.0 pg    MCHC 33.8 31.5 - 35.7 g/dL    RDW 13.0 12.3 - 15.4 %    RDW-SD 45.1 37.0 - 54.0 fl    MPV 8.6 6.0 - 12.0 fL    Platelets 75 (L) 140 - 450 10*3/mm3   Scan Slide    Collection Time: 11/08/23  4:43 AM    Specimen: Blood   Result Value Ref Range    Scan Slide     Manual Differential    Collection Time: 11/08/23  4:43 AM    Specimen: Blood   Result Value Ref Range    Neutrophil % 58.0 42.7 - 76.0 %    Lymphocyte % 10.0 (L) 19.6 - 45.3 %    Monocyte % 5.0 5.0 - 12.0 %    Eosinophil % 2.0 0.3 - 6.2 %    Bands %  24.0 (H) 0.0 - 5.0 %    Myelocyte % 1.0 (H) 0.0 - 0.0 %    Neutrophils Absolute 12.14 (H) 1.70 - 7.00 10*3/mm3    Lymphocytes Absolute 1.48 0.70 - 3.10 10*3/mm3    Monocytes Absolute 0.74 0.10 - 0.90 10*3/mm3    Eosinophils Absolute 0.30 0.00 - 0.40 10*3/mm3    RBC Morphology Normal Normal    Dohle Bodies Present None Seen    Toxic Granulation Slight/1+ None Seen    Large Platelets Slight/1+ None Seen   POC Glucose Once    Collection Time: 11/08/23  6:11 AM    Specimen: Blood   Result Value Ref Range    Glucose 115 (H) 70 - 105 mg/dL          Imaging:  IR Insert Tunneled CV  Catheter Without Port 5 Plus    Result Date: 11/7/2023  1. Successful placement of left IJ PermCath. Electronically Signed: Agus Perez MD  11/7/2023 11:16 AM EST  Workstation ID: [id number]    US Liver    Result Date: 11/6/2023  Impression: No acute sonographic abnormality of the liver. Electronically Signed: Andrea Galarza MD  11/6/2023 7:43 AM EST  Workstation ID: [id number]    XR Chest 1 View    Result Date: 11/6/2023  Impression: 1. Stable lines and support tubes. 2. Negative for pneumothorax. 3. Persistent central pulmonary vascular congestion and findings suggesting mild pulmonary edema, not significantly changed from the prior study. Electronically Signed: Andrea Galarza MD  11/6/2023 7:07 AM EST  Workstation ID: [id number]    CT Abdomen Pelvis Without Contrast    Result Date: 11/5/2023  Impression: 1. Nonspecific fluid and mixed liquid stool throughout the abdomen without signs of overt obstruction or active inflammation. Correlate for diarrheal illness. 2. Increasing patchy airspace opacities in the lower lobes suspicious for pneumonia versus aspiration. 3. Retained contrast in the right kidney suggesting acute tubular necrosis. No hydronephrosis. 4. Findings of mild volume overload/third spacing including body wall edema, presacral edema and trace ascites. No drainable collection. 5. Other ancillary findings as above. Electronically Signed: Jermaine Heath MD  11/5/2023 11:15 AM EST  Workstation ID: [id number]    MRI Brain Without Contrast    Result Date: 11/5/2023  Impression: MRI findings suggesting diffuse anoxic brain injury. Electronically Signed: Jermaine Heath MD  11/5/2023 10:57 AM EST  Workstation ID: [id number]    XR Chest 1 View    Result Date: 11/5/2023  Impression: 1. Improving vascular congestion and pulmonary edema. 2. Support devices as above, including high ET tube 8.7 cm above andry similar in position to prior exam. Electronically Signed: Jermaine Heath MD  11/5/2023 8:33 AM EST  Workstation ID:  [id number]    XR Chest 1 View    Result Date: 11/4/2023  Impression: Appropriately placed right internal jugular CVC catheter with the tip in the mid SVC. No pneumothorax. Mild pulmonary edema pattern, improved as compared to the previous study. Electronically Signed: Caitlyn Urbina MD  11/4/2023 12:54 AM EDT  Workstation ID: [id number]    XR Chest 1 View    Result Date: 11/3/2023  Impression: Unchanged position of right PICC. Right subclavian dual-lumen venous catheter with the distal tip at the upper to mid SVC. Adequate position of endotracheal and enteric tube. Mildly improved patchy opacities throughout the lungs which may represent true improvement versus technical differences Electronically Signed: Ellis Gibson DO  11/3/2023 5:07 PM EDT  Workstation ID: [id number]    XR Abdomen KUB    Result Date: 11/3/2023  Impression: Feeding tube terminates in the stomach. Electronically Signed: Grey Newton MD  11/3/2023 3:21 AM EDT  Workstation ID: [id number]    XR Chest 1 View    Result Date: 11/3/2023  Impression: 1.Worsening diffuse bilateral airspace disease. 2.Endotracheal tube tip 4.6 cm above the andry. 3.Right upper extremity PICC terminates in the right brachiocephalic vein. Electronically Signed: Grey Newton MD  11/3/2023 3:21 AM EDT  Workstation ID: [id number]    CT Angiogram Chest Pulmonary Embolism    Result Date: 11/3/2023  Impression: 1.Multifocal pneumonia. 2.No evidence of pulmonary embolism. Electronically Signed: Grey Newton MD  11/3/2023 12:22 AM EDT  Workstation ID: [id number]    CT Head Without Contrast    Result Date: 11/3/2023  Impression: 1.Gray-white differentiation is present, but poor throughout the brain and cerebellum. This could be related to anoxic brain injury. This could be further evaluated with follow-up brain MRI or serial CT as indicated. 2.No acute intracranial hemorrhage. Electronically Signed: Grey Newton MD  11/3/2023 12:20 AM EDT  Workstation ID: [id number]    XR Chest 1  View    Result Date: 11/2/2023  Impression: 1.ET tube tip 5.3 cm above the andry. 2.Right apical airspace disease. Electronically Signed: Grey Newton MD  11/2/2023 11:37 PM EDT  Workstation ID: [id number]     Assessment/Plan:     Cardiac arrest with successful resuscitation    Marijuana abuse    Alcohol abuse    Elevated blood sugar       Acute kidney injury   Severe metabolic/ respiratory acidosis   Cardiopulmonary arrest   ARDS   Possible anoxic brain injury   Hyperglycemia   Elevated troponin   Hypokalemia   Hypernatremia   Lactic acidosis  Recommendations:  Dialysis today  We will adjust K bath  Replace potassium and phosphorus and check magnesium  Using tunneled dialysis catheter  Still oliguric 55 cc in 24 hours  Plan is dialysis Monday Wednesday Friday for now

## 2023-11-09 NOTE — PROGRESS NOTES
Nutrition Services    Patient Name: Yusuf Jin  YOB: 1970  MRN: [id number]  Admission date: 11/2/2023    PPE Documentation        PPE Worn By Provider Did not enter room for this encounter   PPE Worn By Patient  N/A     PROGRESS NOTE      Encounter Information: Checking in on nutrition plan of care. Plan for Aissatou at discharge. TF held recently r/t high residuals. Residuals > 500 mL at 0820.          PO Diet: NPO Diet NPO Type: Strict NPO   PO Supplements: None ordered    PO Intake:  NPO       Current nutrition support: Novasource Renal at 20 mL/hour + 10 mL/hour water flush    Nutrition support review: TF resumed, but residuals > 600 mL so TF on hold again       Labs (reviewed below): Hypophosphatemia - replaced today        GI Function:  FMS in place, no output in last 24 hours  Residuals > 500 mL this AM       Nutrition Intervention Updates: Restart TF at 20 mL/hour per MD    Continue 10 mL/hour water flush        Results from last 7 days   Lab Units 11/09/23  1302 11/09/23  0614 11/08/23  2030 11/08/23 0443 11/07/23  0452   SODIUM mmol/L 138 138  --  140 136   POTASSIUM mmol/L 4.4 4.3 3.3* 2.8* 3.8   CHLORIDE mmol/L 106 107  --  107 104   CO2 mmol/L 23.0 23.0  --  21.0* 22.0   BUN mg/dL 34* 31*  --  50* 37*   CREATININE mg/dL 5.39* 4.90*  --  6.19* 4.41*   CALCIUM mg/dL 8.4* 8.2*  --  7.7* 7.7*   BILIRUBIN mg/dL  --  0.6  --  0.6 0.7   ALK PHOS U/L  --  116  --  108 154*   ALT (SGPT) U/L  --  89*  --  115* 198*   AST (SGOT) U/L  --  85*  --  106* 223*   GLUCOSE mg/dL 92 97  --  111* 120*     Results from last 7 days   Lab Units 11/09/23  1302 11/09/23  0614 11/08/23  0443 11/03/23  1026 11/03/23  0536   MAGNESIUM mg/dL 1.8 1.8 2.0   < > 1.9   PHOSPHORUS mg/dL  --  0.7* 0.9*   < > 4.0   HEMOGLOBIN g/dL  --  8.7* 8.3*   < > 16.1   HEMATOCRIT %  --  26.0* 24.6*   < > 47.6   TRIGLYCERIDES mg/dL  --   --   --   --  90    < > = values in this interval not displayed.     COVID19   Date Value  Ref Range Status   11/03/2023 Not Detected Not Detected - Ref. Range Final     Lab Results   Component Value Date    HGBA1C 5.10 11/03/2023       RD to follow up per protocol.    Electronically signed by:  Mayte Mccullough RD  11/09/23 16:27 EST

## 2023-11-09 NOTE — PAYOR COMM NOTE
"This is clinical update for Yusuf Jin  Reference/Auth#: [id number]     EXTENDED AUTHORIZATION PENDING:     Please call or fax determination to contact below.   Thank you.    Mariela Bear RN, BSN  Utilization Review Nurse  [facility]  Direct & confidential phone # [phone]  Fax # [phone]      Yusuf Jin (52 y.o. Male)       Date of Birth   1970    Social Security Number       Address   [address] [place] [address] [place] [address]    Home Phone   [phone]    MRN   [id number]       Congregation   Unknown    Marital Status   Unknown                            Admission Date   11/2/23    Admission Type   Emergency    Admitting Provider   Victoriano Phillip DO    Attending Provider   Victoriano Phillip DO    Department, Room/Bed   [facility] CARDIOVASCULAR CARE UNIT, 2207/1       Discharge Date       Discharge Disposition       Discharge Destination                                 Attending Provider: Victoriano Phillip DO    Allergies: No Known Allergies    Isolation: None   Infection: None   Code Status: CPR    Ht: 175.3 cm (69\")   Wt: 88.9 kg (196 lb)    Admission Cmt: None   Principal Problem: Cardiac arrest with successful resuscitation [I46.9]                   Active Insurance as of 11/2/2023       Primary Coverage       Payor Plan Insurance Group Employer/Plan Group    ANTHEM MEDICAID ANTHEM MEDICAID [id number]       Payor Plan Address Payor Plan Phone Number Payor Plan Fax Number Effective Dates    [address] [address] [phone]  7/2/2019 - None Entered    [facility] [id number]         Subscriber Name Subscriber Birth Date Member ID       YUSUF JIN 1970 [id number]                     Emergency Contacts        (Rel.) Home Phone Work Phone Mobile Phone    Tamiko Jin (Daughter) -- -- [phone]    Yusuf Interiano (Son) -- -- [phone]    Huong Jin (Mother) -- -- [phone]    " Bree Riggins (Sister) -- -- [phone]              Operative/Procedure Notes (last 48 hours)  Notes from 23 0951 through 23 0951   No notes of this type exist for this encounter.          Physician Progress Notes (last 48 hours)        Cristina Jed LANIEREDOUARD at 23 1035       Attestation signed by Tommy Mendieta MD at 23 1048    No change in patient's status, platelet counts improving.  Awaiting LTAC placement.  Increase midodrine, wean off norepinephrine.  Start trickle feeds, KUB with possible ileus.    Electronically signed by Tommy Mendieta MD, 23, 10:48 AM EST.                      Critical Care Progress Note   Yusuf Jin : 1970 MRN:[id number] LOS:5     Principal Problem: Cardiac arrest with successful resuscitation     Reason for follow up: All the medical problems listed below    Summary     A 52 y.o. male with PMH of marijuana abuse and alcoholism presented to the hospital after a witnessed cardiac arrest and was admitted with a principal diagnosis of Cardiac arrest with successful resuscitation. Apparently, his marijuana was tested positive for fentanyl on a home kit, done by his sister.  Initial rhythm of asystole, unknown duration of CPR by EMS, multiple rounds of CPR in ED.  Intubated in the ED for hypoxic respiratory failure.   CT angio showed multifocal pneumonia.  CT head on admission with poor gray-white differentiation.  MRI brain on  showed diffuse anoxic brain injury.  Neurology was consulted and deemed poor prognosis.    On admission, he also went into full-blown septic shock, treated with broad-spectrum antibiotics and needed multiple vasopressors.  Also had CONOR with metabolic acidosis, treated with bicarb drip and nephrology was consulted.  Eventually needed CRRT. Subsequently, also developed right hand ischemia due to radial artery dissection, vascular surgery was consulted and recommended conservative management.    Palliative care  consulted for goals of care discussion.  Family opted for trach/PEG and LTAC placement.  Patient is being worked up for LTACH.    Significant events     11/08/23 : Central line in place continues due to Levophed gtt.  HD planned for today.  Precedex off, Xanax changed to PRN.  Nothing purposeful on examination.  Patient attempted SBP trial this AM and became tachypneic and hypoxic requiring to be changed back to rate controlled setting, this is the second failed SBP.      Assessment / Plan     Cardiac arrest  Presumed to be from substance abuse.  Echocardiogram with LVEF of 26 to 30%.      Cardiogenic shock  Off vasopressors.  Possibility of septic shock cannot be ruled out.  CT abdomen suspicious for enterocolitis.  Continue with Zosyn.  Stool for C. difficile was negative.    Acute Systolic heart failure:   Currently decompensated.   Last ECHO showed an EF of 26-30%.   Unclear etiology. Holding off on any ischemic evaluation at this time until mentation improved.  Monitor Input/Output very closely. Follow daily weights.   Net IO Since Admission: 10,452.15 mL [11/08/23 1035]    Acute respiratory failure with hypoxia and hypercapnia:   Likely due to pneumonia.  Ventilator settings noted and adjusted as needed.  We will attempt a spontaneous breathing trial today.  Minimize sedation/analgesia to keep RASS of 0 to -1.    Acute Kidney Injury:   Remains anuric.   Likely ATN from shock state.    Nephrology following, scheduled for tunneled HD catheter placement today.  Monitor Input/Output very closely.   Net IO Since Admission: 10,452.15 mL [11/08/23 1035]     Anoxic brain injury  MRI on 11/5 showed diffuse global anoxic brain injury.    Neurology following, deemed poor long-term prognosis.  On empiric Keppra per neurology, stopping Keppra and will monitor for seizures.  Precedex off, Xanax changed to PRN.    Right radial artery dissection  Vascular surgery following, recommended conservative management.    Acute  thrombocytopenia  Unclear etiology.  HIT antibody negative.  Some component of hemodilution is possible given that he is in 7.5 L positive fluid balance.  Monitor platelets closely.  Normal INR, DIC ruled out.    Normocytic anemia: Likely from hemodilution, hemoglobin dropped from 13.6 -> 9.5 and remained stable since then.    Hypoglycemia: Advance tube feeds and likely can DC D10 drip.    Coagulase-negative staph bacteremia: Likely contaminant, no further work-up needed.  DC vancomycin.    Transaminitis/shock liver: Liver enzymes improving.    Code status:   Level Of Support Discussed With: Next of Kin (If No Surrogate)  Code Status (Patient has no pulse and is not breathing): CPR (Attempt to Resuscitate)  Medical Interventions (Patient has pulse or is breathing): Full Support       Nutrition: NPO Diet NPO Type: Strict NPO   Diet, Tube Feeding Tube Feeding Formula: Novasource Renal (Nepro); Tube Feeding Type: Continuous; Continuous Tube Feeding Start Rate (mL/hr): 20; Then Advance Rate By (mL/hr): Do Not Advance; Every __ Hours: Patient at Goal Rate; To Goal Rate of (...    DVT prophylaxis:  Mechanical DVT prophylaxis orders are present.     Subjective / Review of systems     Review of Systems   Intubated and obtunded  Objective / Physical Exam   Vital signs:  Temp: 97.4 °F (36.3 °C)  BP: 107/65  Heart Rate: 70  Resp: (!) 30  SpO2: 100 %  Weight: 89.3 kg (196 lb 13.9 oz)    Admission Weight: Weight: 72 kg (158 lb 11.7 oz)  Current Weight: Weight: 89.3 kg (196 lb 13.9 oz)    Input/Output in last 24 hours:    Intake/Output Summary (Last 24 hours) at 11/8/2023 1035  Last data filed at 11/8/2023 0600  Gross per 24 hour   Intake 1672.43 ml   Output 50 ml   Net 1622.43 ml      Physical Exam  Vitals and nursing note reviewed.   Constitutional:       Appearance: Normal appearance.      Interventions: He is intubated.   HENT:      Head: Normocephalic and atraumatic.      Mouth/Throat:      Mouth: Mucous membranes are moist.       Pharynx: Oropharynx is clear. No oropharyngeal exudate.      Comments: ET tube in place  Eyes:      General: No scleral icterus.     Conjunctiva/sclera: Conjunctivae normal.   Neck:      Comments: Tracheostomy in place.  Cardiovascular:      Rate and Rhythm: Normal rate.      Pulses: Normal pulses.      Heart sounds: No murmur heard.     No gallop.   Pulmonary:      Effort: Pulmonary effort is normal. He is intubated.      Breath sounds: Rales (Bibasilar) present. No wheezing.   Abdominal:      General: Bowel sounds are normal.      Palpations: Abdomen is soft.      Tenderness: There is no abdominal tenderness.      Comments: PEG tube in place.   Musculoskeletal:      Right lower leg: No edema.      Left lower leg: No edema.   Skin:     Comments: Right IJ central line, RIJ tunneled catheter    Improved warmth on right hand.   Neurological:      Comments: Obtunded        Radiology and Labs     Results from last 7 days   Lab Units 11/08/23 0443 11/07/23 0452 11/06/23  0902 11/06/23  0427 11/05/23  2342 11/05/23  1609 11/05/23  0756 11/05/23  0030   WBC 10*3/mm3 14.80* 11.70*  --  8.40  --  8.10  --  10.70   HEMATOCRIT % 24.6* 27.9* 27.9* 27.1* 27.4* 28.3*   < > 34.8*   PLATELETS 10*3/mm3 75* 59*  --  53*  --  54*  --  81*    < > = values in this interval not displayed.      Results from last 7 days   Lab Units 11/08/23 0443 11/07/23 0452 11/06/23  0427 11/05/23  2001 11/05/23  1202   SODIUM mmol/L 140 136 137 138 139   POTASSIUM mmol/L 2.8* 3.8 3.4* 4.4 4.5   CHLORIDE mmol/L 107 104 101 103 104   CO2 mmol/L 21.0* 22.0 27.0 25.0 25.0   BUN mg/dL 50* 37* 34* 27* 18   CREATININE mg/dL 6.19* 4.41* 3.68* 3.03* 2.28*      Current medications     Scheduled Meds:   chlorhexidine, 15 mL, Mouth/Throat, Q12H  lansoprazole, 30 mg, Per G Tube, Daily  midodrine, 10 mg, Per G Tube, Q8H  piperacillin-tazobactam, 3.375 g, Intravenous, Q12H  potassium phosphate, 15 mmol, Intravenous, Once  senna-docusate sodium, 2 tablet,  Nasogastric, BID  sodium chloride, 10 mL, Intravenous, Q12H  sodium chloride, 10 mL, Intravenous, Q12H  sodium chloride, 10 mL, Intravenous, Q12H      Continuous Infusions:   dextrose, 25 mL/hr, Last Rate: 25 mL/hr (23)  norepinephrine, 0.02-0.5 mcg/kg/min, Last Rate: 0.06 mcg/kg/min (23 0113)  Pharmacy Consult,       Plan discussed with RN. Reviewed all other data in the last 24 hours, including but not limited to vitals, labs, microbiology, imaging and pertinent notes from other providers. High complexity decision making and high risk of deterioration.    Jed Evans, EDOUARD   Critical Care  23   10:35 EST       Electronically signed by Tommy Mendieta MD at 23 1048       Genesis Cunha MD at 23 0715                                                                                                                                                Nephrology  Progress Note                                        [facility]    Patient Identification    Name: Yusuf Jin  Age: 52 y.o.  Sex: male  :  1970  MRN: [id number]      DATE OF SERVICE:  2023        Subective    On the vent unresponsive  On Levophed       Objective   Scheduled Meds:ALPRAZolam, 0.5 mg, Per G Tube, TID  chlorhexidine, 15 mL, Mouth/Throat, Q12H  lansoprazole, 30 mg, Per G Tube, Daily  levETIRAcetam, 750 mg, Intravenous, Q12H  midodrine, 5 mg, Per G Tube, Q8H  piperacillin-tazobactam, 3.375 g, Intravenous, Q12H  potassium chloride, 20 mEq, Intravenous, Q1H  potassium phosphate, 15 mmol, Intravenous, Once  senna-docusate sodium, 2 tablet, Oral, BID  sodium chloride, 10 mL, Intravenous, Q12H  sodium chloride, 10 mL, Intravenous, Q12H  sodium chloride, 10 mL, Intravenous, Q12H          Continuous Infusions:dexmedetomidine, 0.2-1.5 mcg/kg/hr, Last Rate: Stopped (23 1130)  dextrose, 25 mL/hr, Last Rate: 25 mL/hr (23)  norepinephrine, 0.02-0.5 mcg/kg/min, Last Rate:  "0.06 mcg/kg/min (11/08/23 0113)  Pharmacy Consult,         PRN Meds:  acetaminophen **OR** acetaminophen    artificial tears    senna-docusate sodium **AND** polyethylene glycol **AND** bisacodyl **AND** bisacodyl    dextrose    dextrose    fentaNYL citrate (PF)    glucagon (human recombinant)    heparin (porcine)    ipratropium-albuterol    Magnesium Standard Dose Replacement - Follow Nurse / BPA Driven Protocol    nitroglycerin    ondansetron **OR** ondansetron    Pharmacy Consult    sodium chloride    sodium chloride    sodium chloride    sodium chloride    sodium chloride     Exam:  /60   Pulse 72   Temp 98.9 °F (37.2 °C) (Axillary)   Resp (!) 30   Ht 175.3 cm (69\")   Wt 89.3 kg (196 lb 13.9 oz)   SpO2 100%   BMI 29.07 kg/m²     Intake/Output last 3 shifts:  I/O last 3 completed shifts:  In: 3868.4 [I.V.:2913.4; Blood:237; Other:312; NG/GT:406]  Out: 205 [Urine:55; Stool:150]    Intake/Output this shift:  No intake/output data recorded.    Physical exam:  General Appearance: Trach vent presors    Head:  Normocephalic, without obvious abnormality, atraumatic  Eyes: conjunctiva/corneas clear     Neck:  Supple,  no adenopathy;      Lungs: Tunneled dialysis catheter noted decreased BS occasion ronchi  Heart:  Regular rate and rhythm, S1 and S2 normal  Abdomen: PEG tube noted soft, non-tender, bowel sounds active   Extremities: trace edema  Pulses: 2+ and symmetric all extremities  Skin:  No rashes or lesions       Data Review:  All labs (24hrs):   Recent Results (from the past 24 hour(s))   POC Glucose Once    Collection Time: 11/07/23  7:27 AM    Specimen: Blood   Result Value Ref Range    Glucose 123 (H) 70 - 105 mg/dL   POC Glucose Once    Collection Time: 11/07/23 12:02 PM    Specimen: Blood   Result Value Ref Range    Glucose 117 (H) 70 - 105 mg/dL   Phosphorus    Collection Time: 11/07/23  3:55 PM    Specimen: Blood   Result Value Ref Range    Phosphorus 0.8 (C) 2.5 - 4.5 mg/dL   POC Glucose Once "    Collection Time: 11/08/23  1:08 AM    Specimen: Blood   Result Value Ref Range    Glucose 111 (H) 70 - 105 mg/dL   Prepare Platelet Pheresis, 1 Units    Collection Time: 11/08/23  2:00 AM   Result Value Ref Range    Product Code A7558K64     Unit Number [id number]     UNIT  ABO A     UNIT  RH POS     Dispense Status PT     Blood Expiration Date [id number]     Blood Type Barcode 6200    Comprehensive Metabolic Panel    Collection Time: 11/08/23  4:43 AM    Specimen: Blood   Result Value Ref Range    Glucose 111 (H) 65 - 99 mg/dL    BUN 50 (H) 6 - 20 mg/dL    Creatinine 6.19 (H) 0.76 - 1.27 mg/dL    Sodium 140 136 - 145 mmol/L    Potassium 2.8 (L) 3.5 - 5.2 mmol/L    Chloride 107 98 - 107 mmol/L    CO2 21.0 (L) 22.0 - 29.0 mmol/L    Calcium 7.7 (L) 8.6 - 10.5 mg/dL    Total Protein 4.7 (L) 6.0 - 8.5 g/dL    Albumin 2.4 (L) 3.5 - 5.2 g/dL    ALT (SGPT) 115 (H) 1 - 41 U/L    AST (SGOT) 106 (H) 1 - 40 U/L    Alkaline Phosphatase 108 39 - 117 U/L    Total Bilirubin 0.6 0.0 - 1.2 mg/dL    Globulin 2.3 gm/dL    A/G Ratio 1.0 g/dL    BUN/Creatinine Ratio 8.1 7.0 - 25.0    Anion Gap 12.0 5.0 - 15.0 mmol/L    eGFR 10.1 (L) >60.0 mL/min/1.73   Phosphorus    Collection Time: 11/08/23  4:43 AM    Specimen: Blood   Result Value Ref Range    Phosphorus 0.9 (C) 2.5 - 4.5 mg/dL   CBC Auto Differential    Collection Time: 11/08/23  4:43 AM    Specimen: Blood   Result Value Ref Range    WBC 14.80 (H) 3.40 - 10.80 10*3/mm3    RBC 2.61 (L) 4.14 - 5.80 10*6/mm3    Hemoglobin 8.3 (L) 13.0 - 17.7 g/dL    Hematocrit 24.6 (L) 37.5 - 51.0 %    MCV 94.3 79.0 - 97.0 fL    MCH 31.9 26.6 - 33.0 pg    MCHC 33.8 31.5 - 35.7 g/dL    RDW 13.0 12.3 - 15.4 %    RDW-SD 45.1 37.0 - 54.0 fl    MPV 8.6 6.0 - 12.0 fL    Platelets 75 (L) 140 - 450 10*3/mm3   Scan Slide    Collection Time: 11/08/23  4:43 AM    Specimen: Blood   Result Value Ref Range    Scan Slide     Manual Differential    Collection Time: 11/08/23  4:43 AM    Specimen: Blood    Result Value Ref Range    Neutrophil % 58.0 42.7 - 76.0 %    Lymphocyte % 10.0 (L) 19.6 - 45.3 %    Monocyte % 5.0 5.0 - 12.0 %    Eosinophil % 2.0 0.3 - 6.2 %    Bands %  24.0 (H) 0.0 - 5.0 %    Myelocyte % 1.0 (H) 0.0 - 0.0 %    Neutrophils Absolute 12.14 (H) 1.70 - 7.00 10*3/mm3    Lymphocytes Absolute 1.48 0.70 - 3.10 10*3/mm3    Monocytes Absolute 0.74 0.10 - 0.90 10*3/mm3    Eosinophils Absolute 0.30 0.00 - 0.40 10*3/mm3    RBC Morphology Normal Normal    Dohle Bodies Present None Seen    Toxic Granulation Slight/1+ None Seen    Large Platelets Slight/1+ None Seen   POC Glucose Once    Collection Time: 11/08/23  6:11 AM    Specimen: Blood   Result Value Ref Range    Glucose 115 (H) 70 - 105 mg/dL          Imaging:  IR Insert Tunneled CV Catheter Without Port 5 Plus    Result Date: 11/7/2023  1. Successful placement of left IJ PermCath. Electronically Signed: Agus Perez MD  11/7/2023 11:16 AM EST  Workstation ID: [id number]    US Liver    Result Date: 11/6/2023  Impression: No acute sonographic abnormality of the liver. Electronically Signed: Andrea Galarza MD  11/6/2023 7:43 AM EST  Workstation ID: [id number]    XR Chest 1 View    Result Date: 11/6/2023  Impression: 1. Stable lines and support tubes. 2. Negative for pneumothorax. 3. Persistent central pulmonary vascular congestion and findings suggesting mild pulmonary edema, not significantly changed from the prior study. Electronically Signed: Andrea Galarza MD  11/6/2023 7:07 AM EST  Workstation ID: [id number]    CT Abdomen Pelvis Without Contrast    Result Date: 11/5/2023  Impression: 1. Nonspecific fluid and mixed liquid stool throughout the abdomen without signs of overt obstruction or active inflammation. Correlate for diarrheal illness. 2. Increasing patchy airspace opacities in the lower lobes suspicious for pneumonia versus aspiration. 3. Retained contrast in the right kidney suggesting acute tubular necrosis. No hydronephrosis. 4. Findings of mild volume  overload/third spacing including body wall edema, presacral edema and trace ascites. No drainable collection. 5. Other ancillary findings as above. Electronically Signed: Jermaine Heath MD  11/5/2023 11:15 AM EST  Workstation ID: [id number]    MRI Brain Without Contrast    Result Date: 11/5/2023  Impression: MRI findings suggesting diffuse anoxic brain injury. Electronically Signed: Jermaine Heath MD  11/5/2023 10:57 AM EST  Workstation ID: [id number]    XR Chest 1 View    Result Date: 11/5/2023  Impression: 1. Improving vascular congestion and pulmonary edema. 2. Support devices as above, including high ET tube 8.7 cm above andry similar in position to prior exam. Electronically Signed: Jermaine Heath MD  11/5/2023 8:33 AM EST  Workstation ID: [id number]    XR Chest 1 View    Result Date: 11/4/2023  Impression: Appropriately placed right internal jugular CVC catheter with the tip in the mid SVC. No pneumothorax. Mild pulmonary edema pattern, improved as compared to the previous study. Electronically Signed: Caitlyn Urbina MD  11/4/2023 12:54 AM EDT  Workstation ID: [id number]    XR Chest 1 View    Result Date: 11/3/2023  Impression: Unchanged position of right PICC. Right subclavian dual-lumen venous catheter with the distal tip at the upper to mid SVC. Adequate position of endotracheal and enteric tube. Mildly improved patchy opacities throughout the lungs which may represent true improvement versus technical differences Electronically Signed: Ellis Gibson DO  11/3/2023 5:07 PM EDT  Workstation ID: [id number]    XR Abdomen KUB    Result Date: 11/3/2023  Impression: Feeding tube terminates in the stomach. Electronically Signed: Grey Newton MD  11/3/2023 3:21 AM EDT  Workstation ID: [id number]    XR Chest 1 View    Result Date: 11/3/2023  Impression: 1.Worsening diffuse bilateral airspace disease. 2.Endotracheal tube tip 4.6 cm above the andry. 3.Right upper extremity PICC terminates in the right brachiocephalic  vein. Electronically Signed: Grey Newton MD  11/3/2023 3:21 AM EDT  Workstation ID: [id number]    CT Angiogram Chest Pulmonary Embolism    Result Date: 11/3/2023  Impression: 1.Multifocal pneumonia. 2.No evidence of pulmonary embolism. Electronically Signed: Grey Newton MD  11/3/2023 12:22 AM EDT  Workstation ID: [id number]    CT Head Without Contrast    Result Date: 11/3/2023  Impression: 1.Gray-white differentiation is present, but poor throughout the brain and cerebellum. This could be related to anoxic brain injury. This could be further evaluated with follow-up brain MRI or serial CT as indicated. 2.No acute intracranial hemorrhage. Electronically Signed: Grey Newton MD  11/3/2023 12:20 AM EDT  Workstation ID: [id number]    XR Chest 1 View    Result Date: 2023  Impression: 1.ET tube tip 5.3 cm above the andry. 2.Right apical airspace disease. Electronically Signed: Grey Newton MD  2023 11:37 PM EDT  Workstation ID: [id number]     Assessment/Plan:     Cardiac arrest with successful resuscitation    Marijuana abuse    Alcohol abuse    Elevated blood sugar       Acute kidney injury   Severe metabolic/ respiratory acidosis   Cardiopulmonary arrest   ARDS   Possible anoxic brain injury   Hyperglycemia   Elevated troponin   Hypokalemia   Hypernatremia   Lactic acidosis  Recommendations:  Dialysis today  We will adjust K bath  Replace potassium and phosphorus and check magnesium  Using tunneled dialysis catheter  Still oliguric 55 cc in 24 hours  Plan is dialysis  for now        Electronically signed by Genesis Cunha MD at 23 1320       Rm Reeves MD at 23 1526          General Surgery Progress Note    Name: Yusuf Jin ADMIT: 2023   : 1970  PCP: Provider, No Known    MRN: [id number] LOS: 4 days   AGE/SEX: 52 y.o. male  ROOM:    [facility]    Chief Complaint   Patient presents with    Cardiac Arrest     Subjective     On vent with trach in  place, tolerating tube feeds at trickle rate    Objective     Scheduled Medications:   ALPRAZolam, 0.5 mg, Per G Tube, TID  chlorhexidine, 15 mL, Mouth/Throat, Q12H  [START ON 11/8/2023] lansoprazole, 30 mg, Per G Tube, Daily  levETIRAcetam, 750 mg, Intravenous, Q12H  midodrine, 5 mg, Per G Tube, TID AC  piperacillin-tazobactam, 3.375 g, Intravenous, Q12H  sodium chloride, 10 mL, Intravenous, Q12H  sodium chloride, 10 mL, Intravenous, Q12H  sodium chloride, 10 mL, Intravenous, Q12H        Active Infusions:  dexmedetomidine, 0.2-1.5 mcg/kg/hr, Last Rate: Stopped (11/07/23 1130)  dextrose, 25 mL/hr, Last Rate: 25 mL/hr (11/07/23 0943)  norepinephrine, 0.02-0.5 mcg/kg/min, Last Rate: 0.02 mcg/kg/min (11/07/23 1359)  Pharmacy Consult,         As Needed Medications:    acetaminophen **OR** acetaminophen    artificial tears    Calcium Replacement - Follow Nurse / BPA Driven Protocol    dextrose    dextrose    fentaNYL citrate (PF)    glucagon (human recombinant)    heparin (porcine)    ipratropium-albuterol    Magnesium Standard Dose Replacement - Follow Nurse / BPA Driven Protocol    nitroglycerin    ondansetron **OR** ondansetron    Pharmacy Consult    Phosphorus Replacement - Follow Nurse / BPA Driven Protocol    Potassium Replacement - Follow Nurse / BPA Driven Protocol    sodium chloride    sodium chloride    sodium chloride    sodium chloride    sodium chloride    Vital Signs  Vital Signs Patient Vitals for the past 24 hrs:   BP Temp Temp src Pulse Resp SpO2 Weight   11/07/23 1450 -- -- -- 79 -- 100 % --   11/07/23 1203 -- 97.9 °F (36.6 °C) Axillary -- (!) 30 -- --   11/07/23 1145 122/77 -- -- -- -- -- --   11/07/23 1118 98/57 -- -- 91 23 92 % --   11/07/23 1106 (!) 83/52 -- -- 83 (!) 31 100 % --   11/07/23 1050 100/60 -- -- -- -- -- --   11/07/23 1049 -- -- -- 85 24 100 % --   11/07/23 1030 106/60 98.8 °F (37.1 °C) Axillary 74 (!) 31 100 % --   11/07/23 0958 154/93 99.3 °F (37.4 °C) Axillary 72 (!) 30 100 % --    11/07/23 0952 -- -- -- -- -- 100 % --   11/07/23 0802 -- 98.8 °F (37.1 °C) Axillary -- (!) 30 -- --   11/07/23 0700 137/88 -- -- 74 -- 100 % --   11/07/23 0615 -- -- -- 75 (!) 30 100 % --   11/07/23 0600 146/92 -- -- 72 -- 100 % 84.8 kg (186 lb 15.2 oz)   11/07/23 0500 126/81 -- -- 67 -- 100 % --   11/07/23 0400 145/90 -- -- 71 -- 100 % --   11/07/23 0324 109/69 99 °F (37.2 °C) Axillary 61 (!) 30 100 % --   11/07/23 0300 97/54 -- -- 59 -- 99 % --   11/07/23 0100 98/64 -- -- 62 -- 100 % --   11/07/23 0051 96/58 -- -- 60 -- -- --   11/07/23 0000 102/65 98 °F (36.7 °C) Axillary 64 (!) 30 100 % --   11/06/23 2300 108/71 -- -- 64 -- 100 % --   11/06/23 2214 120/71 -- -- 66 -- -- --   11/06/23 2200 107/73 -- -- 63 -- 100 % --   11/06/23 2100 106/70 -- -- 67 -- 100 % --   11/06/23 2000 117/81 -- -- 66 -- 100 % --   11/06/23 1916 110/69 97.9 °F (36.6 °C) Axillary 66 (!) 32 100 % --   11/06/23 1655 116/74 97.4 °F (36.3 °C) Axillary -- (!) 30 -- --   11/06/23 1630 107/75 -- -- 67 -- 100 % --   11/06/23 1615 109/71 -- -- 65 -- 100 % --   11/06/23 1600 105/70 -- -- 66 -- 100 % --   11/06/23 1545 107/68 -- -- 62 -- 100 % --   11/06/23 1530 112/71 -- -- 75 -- 97 % --     I/O:  I/O last 3 completed shifts:  In: 6815 [I.V.:6455; Other:171; NG/GT:189]  Out: 3310 [Urine:160; Stool:1150]    Physical Exam:  Physical Exam  Constitutional:       General: He is not in acute distress.     Appearance: Normal appearance. He is not ill-appearing.   HENT:      Head: Normocephalic and atraumatic.      Right Ear: External ear normal.      Left Ear: External ear normal.   Eyes:      Extraocular Movements: Extraocular movements intact.      Conjunctiva/sclera: Conjunctivae normal.   Cardiovascular:      Rate and Rhythm: Normal rate and regular rhythm.   Pulmonary:      Effort: Pulmonary effort is normal. No respiratory distress.   Abdominal:      General: There is no distension.      Palpations: Abdomen is soft.      Tenderness: There is no  abdominal tenderness.   Musculoskeletal:         General: No swelling or deformity.   Skin:     General: Skin is warm and dry.   Neurological:      Mental Status: He is alert and oriented to person, place, and time. Mental status is at baseline.         Results Review:     CBC    Results from last 7 days   Lab Units 11/07/23  0452 11/06/23  0902 11/06/23  0427 11/05/23  2342 11/05/23  1609 11/05/23  0756 11/05/23  0030 11/04/23  0831 11/04/23  0335 11/03/23  1756 11/03/23  1026 11/03/23  0536   WBC 10*3/mm3 11.70*  --  8.40  --  8.10  --  10.70  --  6.40  --  2.00* 2.80*   HEMOGLOBIN g/dL 9.3* 9.5* 9.3* 9.4* 9.6* 10.7* 12.0*   < > 14.6   < > 16.0 16.1   PLATELETS 10*3/mm3 59*  --  53*  --  54*  --  81*  --  133*  --  231 242    < > = values in this interval not displayed.     BMP   Results from last 7 days   Lab Units 11/07/23  0452 11/06/23  0427 11/05/23  2001 11/05/23  1202 11/05/23  0756 11/05/23  0030 11/04/23  1549 11/04/23  0831   SODIUM mmol/L 136 137 138 139 137 140 140 141   POTASSIUM mmol/L 3.8 3.4* 4.4 4.5 4.3 4.8 5.7* 5.6*   CHLORIDE mmol/L 104 101 103 104 103 104 105 106   CO2 mmol/L 22.0 27.0 25.0 25.0 26.0 24.0 23.0 23.0   BUN mg/dL 37* 34* 27* 18 13 14 18 24*   CREATININE mg/dL 4.41* 3.68* 3.03* 2.28* 1.77* 1.94* 2.41* 3.23*   GLUCOSE mg/dL 120* 110* 117* 88 107* 127* 142* 207*   MAGNESIUM mg/dL 1.9 1.7  --  1.6 1.6 1.6 1.9 1.8   PHOSPHORUS mg/dL 1.7* 1.9*  --  4.1 3.5 4.3 3.7 2.6     Radiology(recent) IR Insert Tunneled CV Catheter Without Port 5 Plus    Result Date: 11/7/2023  1. Successful placement of left IJ PermCath. Electronically Signed: Agus Perez MD  11/7/2023 11:16 AM EST  Workstation ID: [id number]    US Liver    Result Date: 11/6/2023  Impression: No acute sonographic abnormality of the liver. Electronically Signed: Andrea Galarza MD  11/6/2023 7:43 AM EST  Workstation ID: [id number]    XR Chest 1 View    Result Date: 11/6/2023  Impression: 1. Stable lines and support tubes. 2. Negative  for pneumothorax. 3. Persistent central pulmonary vascular congestion and findings suggesting mild pulmonary edema, not significantly changed from the prior study. Electronically Signed: Andrea Galarza MD  2023 7:07 AM EST  Workstation ID: [id number]     I reviewed the patient's new clinical results.    Assessment & Plan       Cardiac arrest with successful resuscitation    Marijuana abuse    Alcohol abuse    Elevated blood sugar      52 y.o. male postop day 1 from tracheostomy and PEG tube placement.  PEG tube bumper to be secured at 3 cm.  Okay to advance tube feeds to goal as patient tolerates per dietitian's recommendations.  Trach sutures can be cut out on Friday, 11/10/2023.  I will be available as needed, please call with changes questions or concerns.        This note was created using Dragon Voice Recognition software.    Rm Reeves MD  23  15:26 EST     Electronically signed by Rm Reeves MD at 23 1527       Tommy Mendieta MD at 23 1013            Critical Care Progress Note   Yusuf Jin : 1970 MRN:[id number] LOS:4     Principal Problem: Cardiac arrest with successful resuscitation     Reason for follow up: All the medical problems listed below    Summary     A 52 y.o. male with PMH of marijuana abuse and alcoholism presented to the hospital after a witnessed cardiac arrest and was admitted with a principal diagnosis of Cardiac arrest with successful resuscitation. Apparently, his marijuana was tested positive for fentanyl on a home kit, done by his sister.  Initial rhythm of asystole, unknown duration of CPR by EMS, multiple rounds of CPR in ED.  Intubated in the ED for hypoxic respiratory failure.   CT angio showed multifocal pneumonia.  CT head on admission with poor gray-white differentiation.  MRI brain on  showed diffuse anoxic brain injury.  Neurology was consulted and deemed poor prognosis.    On admission, he also went into full-blown septic  shock, treated with broad-spectrum antibiotics and needed multiple vasopressors.  Also had CONOR with metabolic acidosis, treated with bicarb drip and nephrology was consulted.  Eventually needed CRRT. Subsequently, also developed right hand ischemia due to radial artery dissection, vascular surgery was consulted and recommended conservative management.    Palliative care consulted for goals of care discussion.  Family opted for trach/PEG and LTAC placement.    Significant events     11/07/23 : Remove right IJ and subclavian central line and Shepherd catheter.  Consult IR for tunneled HD catheter placement.    Assessment / Plan     Cardiac arrest  Presumed to be from substance abuse.  Echocardiogram with LVEF of 26 to 30%.      Cardiogenic shock  Off vasopressors.  Possibility of septic shock cannot be ruled out.  CT abdomen suspicious for enterocolitis.  Continue with Zosyn.  Stool for C. difficile was negative.    Acute Systolic heart failure:   Currently decompensated.   Last ECHO showed an EF of 26-30%.   Unclear etiology. Holding off on any ischemic evaluation at this time until mentation improved.  Monitor Input/Output very closely. Follow daily weights.   Net IO Since Admission: 8,597.72 mL [11/07/23 1016]    Acute respiratory failure with hypoxia and hypercapnia:   Likely due to pneumonia.  Ventilator settings noted and adjusted as needed.  We will attempt a spontaneous breathing trial today.  Minimize sedation/analgesia to keep RASS of 0 to -1.    Acute Kidney Injury:   Remains anuric.   Likely ATN from shock state.    Nephrology following, scheduled for tunneled HD catheter placement today.  Monitor Input/Output very closely.   Net IO Since Admission: 8,597.72 mL [11/07/23 1016]     Anoxic brain injury  MRI on 11/5 showed diffuse global anoxic brain injury.    Neurology following, deemed poor long-term prognosis.  On empiric Keppra per neurology.  On Precedex for restlessness, add low-dose Xanax with a goal of  weaning of Precedex.    Right radial artery dissection  Vascular surgery following, recommended conservative management.    Acute thrombocytopenia  Unclear etiology.  HIT antibody negative.  Some component of hemodilution is possible given that he is in 7.5 L positive fluid balance.  Monitor platelets closely.  Normal INR, DIC ruled out.    Normocytic anemia: Likely from hemodilution, hemoglobin dropped from 13.6 -> 9.5 and remained stable since then.    Hypoglycemia: Advance tube feeds and likely can DC D10 drip.    Coagulase-negative staph bacteremia: Likely contaminant, no further work-up needed.  DC vancomycin.    Transaminitis/shock liver: Liver enzymes improving.    Code status:   Level Of Support Discussed With: Next of Kin (If No Surrogate)  Code Status (Patient has no pulse and is not breathing): CPR (Attempt to Resuscitate)  Medical Interventions (Patient has pulse or is breathing): Full Support       Nutrition: NPO Diet NPO Type: Strict NPO   Diet, Tube Feeding Tube Feeding Formula: Novasource Renal (Nepro); Tube Feeding Type: Continuous; Continuous Tube Feeding Start Rate (mL/hr): 10; Then Advance Rate By (mL/hr): Do Not Advance; Every __ Hours: Patient at Goal Rate; To Goal Rate of (...    DVT prophylaxis:  Medical and mechanical DVT prophylaxis orders are present.     Subjective / Review of systems     Review of Systems   Intubated and obtunded  Objective / Physical Exam   Vital signs:  Temp: 99.3 °F (37.4 °C)  BP: 154/93  Heart Rate: 72  Resp: (!) 30  SpO2: 100 %  Weight: 84.8 kg (186 lb 15.2 oz)    Admission Weight: Weight: 72 kg (158 lb 11.7 oz)  Current Weight: Weight: 84.8 kg (186 lb 15.2 oz)    Input/Output in last 24 hours:    Intake/Output Summary (Last 24 hours) at 11/7/2023 1016  Last data filed at 11/7/2023 0600  Gross per 24 hour   Intake 3187 ml   Output 2250 ml   Net 937 ml      Physical Exam  Vitals and nursing note reviewed.   Constitutional:       Appearance: Normal appearance.       Interventions: He is intubated.   HENT:      Head: Normocephalic and atraumatic.      Mouth/Throat:      Mouth: Mucous membranes are moist.      Comments: ET tube in place  Eyes:      General: No scleral icterus.     Conjunctiva/sclera: Conjunctivae normal.   Cardiovascular:      Rate and Rhythm: Normal rate.      Pulses: Normal pulses.      Heart sounds: No murmur heard.     No gallop.   Pulmonary:      Effort: Pulmonary effort is normal. He is intubated.      Breath sounds: Rales (Bibasilar) present. No wheezing.   Abdominal:      General: Bowel sounds are normal.      Palpations: Abdomen is soft.      Tenderness: There is no abdominal tenderness.   Musculoskeletal:      Right lower leg: No edema.      Left lower leg: No edema.   Skin:     Comments: Right IJ central line, right subclavian central line, right femoral HD catheter    Improved warmth on right hand   Neurological:      Comments: Obtunded        Radiology and Labs     Results from last 7 days   Lab Units 11/07/23  0452 11/06/23  0902 11/06/23  0427 11/05/23  2342 11/05/23  1609 11/05/23  0756 11/05/23  0030 11/04/23  0831 11/04/23  0335   WBC 10*3/mm3 11.70*  --  8.40  --  8.10  --  10.70  --  6.40   HEMATOCRIT % 27.9* 27.9* 27.1* 27.4* 28.3*   < > 34.8*   < > 42.7   PLATELETS 10*3/mm3 59*  --  53*  --  54*  --  81*  --  133*    < > = values in this interval not displayed.      Results from last 7 days   Lab Units 11/07/23  0452 11/06/23  0427 11/05/23  2001 11/05/23  1202 11/05/23  0756   SODIUM mmol/L 136 137 138 139 137   POTASSIUM mmol/L 3.8 3.4* 4.4 4.5 4.3   CHLORIDE mmol/L 104 101 103 104 103   CO2 mmol/L 22.0 27.0 25.0 25.0 26.0   BUN mg/dL 37* 34* 27* 18 13   CREATININE mg/dL 4.41* 3.68* 3.03* 2.28* 1.77*      Current medications   Scheduled Meds: ALPRAZolam, 0.5 mg, Oral, TID  chlorhexidine, 15 mL, Mouth/Throat, Q12H  lansoprazole, 15 mg, Per G Tube, Daily  levETIRAcetam, 750 mg, Intravenous, Q12H  midodrine, 5 mg, Oral, TID  AC  piperacillin-tazobactam, 3.375 g, Intravenous, Q12H  sodium chloride, 10 mL, Intravenous, Q12H  sodium chloride, 10 mL, Intravenous, Q12H  sodium chloride, 10 mL, Intravenous, Q12H      Continuous Infusions: dexmedetomidine, 0.2-1.5 mcg/kg/hr, Last Rate: 1 mcg/kg/hr (11/07/23 0943)  dextrose, 25 mL/hr, Last Rate: 25 mL/hr (11/07/23 0943)  norepinephrine, 0.02-0.5 mcg/kg/min, Last Rate: 0.02 mcg/kg/min (11/07/23 0950)  Pharmacy Consult,   Phoxillum BK4/2.5, 2,000 mL/hr, Last Rate: Stopped (11/07/23 0700)  Phoxillum BK4/2.5, 2,000 mL/hr, Last Rate: Stopped (11/07/23 0700)  Phoxillum BK4/2.5, 2,000 mL/hr, Last Rate: Stopped (11/07/23 0700)  PrismaSol BGK 2/3.5, 2,000 mL/hr, Last Rate: Stopped (11/05/23 0900)  PrismaSol BGK 2/3.5, 2,000 mL/hr, Last Rate: Stopped (11/05/23 0900)  PrismaSol BGK 2/3.5, 2,000 mL/hr, Last Rate: Stopped (11/05/23 0900)      Plan discussed with RN. Reviewed all other data in the last 24 hours, including but not limited to vitals, labs, microbiology, imaging and pertinent notes from other providers. High complexity decision making and high risk of deterioration.    Tommy Mendieta MD   Critical Care  11/07/23   10:16 EST       Electronically signed by Tommy Mendieta MD at 11/07/23 1016       Consult Notes (last 48 hours)  Notes from 11/07/23 0951 through 11/09/23 0951   No notes of this type exist for this encounter.

## 2023-11-09 NOTE — PROGRESS NOTES
Critical Care Progress Note   Yusuf Jin : 1970 MRN:[id number] LOS:6     Principal Problem: Cardiac arrest with successful resuscitation     Reason for follow up: All the medical problems listed below    Summary     A 52 y.o. male with PMH of marijuana abuse and alcoholism presented to the hospital after a witnessed cardiac arrest and was admitted with a principal diagnosis of Cardiac arrest with successful resuscitation. Apparently, his marijuana was tested positive for fentanyl on a home kit, done by his sister.  Initial rhythm of asystole, unknown duration of CPR by EMS, multiple rounds of CPR in ED.  Intubated in the ED for hypoxic respiratory failure.   CT angio showed multifocal pneumonia.  CT head on admission with poor gray-white differentiation.  MRI brain on  showed diffuse anoxic brain injury.  Neurology was consulted and deemed poor prognosis.    On admission, he also went into full-blown septic shock, treated with broad-spectrum antibiotics and needed multiple vasopressors.  Also had CONOR with metabolic acidosis, treated with bicarb drip and nephrology was consulted.  Eventually needed dialysis and had tunneled HD catheter placement. Subsequently, also developed right hand ischemia due to radial artery dissection, vascular surgery was consulted and recommended conservative management.    Palliative care consulted for goals of care discussion.  Family opted for trach/PEG and LTAC placement.  Patient is currently awaiting placement in [place].    Significant events     23 : Switch Zosyn to cefepime for another 3 days..  Consult ID.    Assessment / Plan     Cardiac arrest  Presumed to be from substance abuse.  Echocardiogram with LVEF of 26 to 30%.      Cardiogenic shock  Still requiring intermittent norepinephrine.  Repeat echocardiogram from  -images show persistent severe systolic dysfunction.  Follow official results.  Possibility of septic shock cannot be ruled out.  CT  abdomen suspicious for enterocolitis.  Stool for C. difficile was negative.  Switch Zosyn to cefepime.  Worsening of WBC count but bandemia is improving.  Procalcitonin significantly elevated around 86.  Remains afebrile, no increase in secretions from ET tube.  Will consult ID.    Acute Systolic heart failure:   Currently decompensated.   Last ECHO showed an EF of 26-30%.   Unclear etiology.  Eventually will need ischemic evaluation, can pursue as an outpatient.  Monitor Input/Output very closely. Follow daily weights.   Net IO Since Admission: 12,682.15 mL [11/09/23 1024]    Acute respiratory failure with hypoxia and hypercapnia:   Likely due to pneumonia.  Ventilator settings noted and adjusted as needed.    Remains obtunded, failed multiple spontaneous breathing trials.    Acute Kidney Injury:   Remains anuric.   Likely ATN from shock state.    Nephrology following, s/p tunneled HD catheter placement.  Monitor Input/Output very closely.   Net IO Since Admission: 12,682.15 mL [11/09/23 1024]     Anoxic brain injury  MRI on 11/5 showed diffuse global anoxic brain injury.    Neurology deemed poor long-term prognosis.    Right radial artery dissection  Vascular surgery following, recommended conservative management.    Acute thrombocytopenia  Unclear etiology.  HIT antibody negative.  Platelet counts are improving.  Likely due to hemodilution, given patient is on 12.5 L positive fluid balance.  Normal INR, DIC ruled out.    Normocytic anemia: Likely from hemodilution, hemoglobin dropped from 13.6 -> 9 and remained stable since then.    Hypoglycemia: Advance tube feeds and likely can DC D10 drip.    Coagulase-negative staph bacteremia: Likely contaminant, no further work-up needed.  DC vancomycin.    Transaminitis/shock liver: Liver enzymes improving.    Code status:   Level Of Support Discussed With: Next of Kin (If No Surrogate)  Code Status (Patient has no pulse and is not breathing): CPR (Attempt to  Resuscitate)  Medical Interventions (Patient has pulse or is breathing): Full Support       Nutrition: NPO Diet NPO Type: Strict NPO   Diet, Tube Feeding Tube Feeding Formula: Novasource Renal (Nepro); Tube Feeding Type: Continuous; Continuous Tube Feeding Start Rate (mL/hr): 20; Then Advance Rate By (mL/hr): Do Not Advance; Every __ Hours: Patient at Goal Rate; To Goal Rate of (...    DVT prophylaxis:  Mechanical DVT prophylaxis orders are present.     Subjective / Review of systems     Review of Systems   Intubated and obtunded  Objective / Physical Exam   Vital signs:  Temp: 96.9 °F (36.1 °C)  BP: 117/77  Heart Rate: 80  Resp: (!) 31  SpO2: 100 %  Weight: 88.9 kg (196 lb)    Admission Weight: Weight: 72 kg (158 lb 11.7 oz)  Current Weight: Weight: 88.9 kg (196 lb)    Input/Output in last 24 hours:    Intake/Output Summary (Last 24 hours) at 11/9/2023 1024  Last data filed at 11/9/2023 0800  Gross per 24 hour   Intake 2505 ml   Output 275 ml   Net 2230 ml      Physical Exam  Vitals and nursing note reviewed.   Constitutional:       Appearance: Normal appearance.      Interventions: He is intubated.   HENT:      Head: Normocephalic and atraumatic.      Mouth/Throat:      Mouth: Mucous membranes are moist.      Pharynx: Oropharynx is clear. No oropharyngeal exudate.      Comments: ET tube in place  Eyes:      General: No scleral icterus.     Conjunctiva/sclera: Conjunctivae normal.   Neck:      Comments: Tracheostomy in place.  Cardiovascular:      Rate and Rhythm: Normal rate.      Pulses: Normal pulses.      Heart sounds: No murmur heard.     No gallop.   Pulmonary:      Effort: Pulmonary effort is normal. He is intubated.      Breath sounds: Rales (Bibasilar) present. No wheezing.   Abdominal:      General: Bowel sounds are normal.      Palpations: Abdomen is soft.      Tenderness: There is no abdominal tenderness.      Comments: PEG tube in place.   Musculoskeletal:      Right lower leg: No edema.      Left  lower leg: No edema.   Skin:     Comments: Right IJ central line, RIJ tunneled catheter    Improved warmth on right hand.   Neurological:      Comments: Obtunded        Radiology and Labs     Results from last 7 days   Lab Units 11/09/23  0614 11/08/23 0443 11/07/23 0452 11/06/23  0902 11/06/23 0427 11/05/23  2342 11/05/23  1609   WBC 10*3/mm3 20.20* 14.80* 11.70*  --  8.40  --  8.10   HEMATOCRIT % 26.0* 24.6* 27.9* 27.9* 27.1*   < > 28.3*   PLATELETS 10*3/mm3 90* 75* 59*  --  53*  --  54*    < > = values in this interval not displayed.      Results from last 7 days   Lab Units 11/09/23 0614 11/08/23  2030 11/08/23 0443 11/07/23 0452 11/06/23 0427 11/05/23 2001   SODIUM mmol/L 138  --  140 136 137 138   POTASSIUM mmol/L 4.3 3.3* 2.8* 3.8 3.4* 4.4   CHLORIDE mmol/L 107  --  107 104 101 103   CO2 mmol/L 23.0  --  21.0* 22.0 27.0 25.0   BUN mg/dL 31*  --  50* 37* 34* 27*   CREATININE mg/dL 4.90*  --  6.19* 4.41* 3.68* 3.03*      Current medications     Scheduled Meds:   cefepime, 2,000 mg, Intravenous, Once  cefepime, 2,000 mg, Intravenous, Q24H  chlorhexidine, 15 mL, Mouth/Throat, Q12H  lansoprazole, 30 mg, Per G Tube, Daily  midodrine, 10 mg, Per G Tube, Q8H  senna-docusate sodium, 2 tablet, Nasogastric, BID  sodium chloride, 10 mL, Intravenous, Q12H  sodium phosphate, 15 mmol, Intravenous, Once      Continuous Infusions:   dextrose, 25 mL/hr, Last Rate: 25 mL/hr (11/07/23 0943)  norepinephrine, 0.02-0.5 mcg/kg/min, Last Rate: 0.1 mcg/kg/min (11/09/23 0822)  Pharmacy to Dose Cefepime,       Plan discussed with RN. Reviewed all other data in the last 24 hours, including but not limited to vitals, labs, microbiology, imaging and pertinent notes from other providers. High complexity decision making and high risk of deterioration.    Tommy Mendieta MD   Critical Care  11/09/23   10:24 EST

## 2023-11-09 NOTE — CONSULTS
Picc team consult:    Informed consent obtained by family member.  Time out performed.  Picc line placed RUE basilic vessel utilizing US guidance and modified seldinger technique with easily compressible vessel without difficulty.

## 2023-11-09 NOTE — PROGRESS NOTES
"                                                                                                                                      Nephrology  Progress Note                                        [facility]    Patient Identification    Name: Yusuf Jin  Age: 52 y.o.  Sex: male  :  1970  MRN: [id number]      DATE OF SERVICE:  2023        Subective    On the vent unresponsive  On Levophed       Objective   Scheduled Meds:chlorhexidine, 15 mL, Mouth/Throat, Q12H  lansoprazole, 30 mg, Per G Tube, Daily  midodrine, 10 mg, Per G Tube, Q8H  piperacillin-tazobactam, 4.5 g, Intravenous, Q12H  senna-docusate sodium, 2 tablet, Nasogastric, BID  sodium chloride, 10 mL, Intravenous, Q12H          Continuous Infusions:dextrose, 25 mL/hr, Last Rate: 25 mL/hr (23 0943)  norepinephrine, 0.02-0.5 mcg/kg/min, Last Rate: 0.12 mcg/kg/min (23 0500)        PRN Meds:  acetaminophen **OR** acetaminophen    ALPRAZolam    artificial tears    bisacodyl    dextrose    dextrose    glucagon (human recombinant)    ipratropium-albuterol    nitroglycerin    ondansetron **OR** ondansetron    polyethylene glycol    Potassium Replacement - Follow Nurse / BPA Driven Protocol    sodium chloride    sodium chloride    sodium chloride     Exam:  /68   Pulse 79   Temp 97.5 °F (36.4 °C) (Axillary)   Resp (!) 30   Ht 175.3 cm (69\")   Wt 89.3 kg (196 lb 13.9 oz)   SpO2 100%   BMI 29.07 kg/m²     Intake/Output last 3 shifts:  I/O last 3 completed shifts:  In: 2828.4 [I.V.:2084.4; Blood:237; Other:181; NG/GT:326]  Out: 330 [Urine:5; Stool:125]    Intake/Output this shift:  I/O this shift:  In: 1586 [I.V.:1386; Other:81; NG/GT:119]  Out: 0     Physical exam:  General Appearance: Trach vent presors    Head:  Normocephalic, without obvious abnormality, atraumatic  Eyes: conjunctiva/corneas clear     Neck:  Supple,  no adenopathy;      Lungs: Tunneled dialysis catheter noted decreased BS occasion " lee ann  Heart:  Regular rate and rhythm, S1 and S2 normal  Abdomen: PEG tube noted soft, non-tender, bowel sounds active   Extremities: trace edema  Pulses: 2+ and symmetric all extremities  Skin:  No rashes or lesions       Data Review:  All labs (24hrs):   Recent Results (from the past 24 hour(s))   POC Glucose Once    Collection Time: 11/08/23 12:31 PM    Specimen: Blood   Result Value Ref Range    Glucose 89 70 - 105 mg/dL   POC Glucose Once    Collection Time: 11/08/23  6:07 PM    Specimen: Blood   Result Value Ref Range    Glucose 95 70 - 105 mg/dL   Potassium    Collection Time: 11/08/23  8:30 PM    Specimen: Blood   Result Value Ref Range    Potassium 3.3 (L) 3.5 - 5.2 mmol/L   POC Glucose Once    Collection Time: 11/09/23 12:32 AM    Specimen: Blood   Result Value Ref Range    Glucose 103 70 - 105 mg/dL   POC Glucose Once    Collection Time: 11/09/23  6:14 AM    Specimen: Blood   Result Value Ref Range    Glucose 99 70 - 105 mg/dL          Imaging:  XR Abdomen KUB    Result Date: 11/9/2023  Impression: Nonobstructive bowel gas pattern. Mild stool burden present. Gastrostomy tube within the left hemiabdomen. Electronically Signed: Caitlyn Urbina MD  11/9/2023 2:00 AM EST  Workstation ID: [id number]    XR Abdomen KUB    Result Date: 11/8/2023  Impression: 1. Moderate gas distention of mid abdominal bowel, thought to represent the colon. Findings may reflect changes of mild ileus. 2. No convincing evidence of high-grade bowel obstruction on this examination. 3. Percutaneous gastrostomy tube projects of the left upper quadrant of the abdomen, newly placed since the prior study. No gross free intraperitoneal air is evident. Electronically Signed: Yaritza Chaparro MD  11/8/2023 8:44 AM EST  Workstation ID: [id number]    IR Insert Tunneled CV Catheter Without Port 5 Plus    Result Date: 11/7/2023  1. Successful placement of left IJ PermCath. Electronically Signed: Agus Perez MD  11/7/2023 11:16 AM EST  Workstation ID:  [id number]    US Liver    Result Date: 11/6/2023  Impression: No acute sonographic abnormality of the liver. Electronically Signed: Andrea Galarza MD  11/6/2023 7:43 AM EST  Workstation ID: [id number]    XR Chest 1 View    Result Date: 11/6/2023  Impression: 1. Stable lines and support tubes. 2. Negative for pneumothorax. 3. Persistent central pulmonary vascular congestion and findings suggesting mild pulmonary edema, not significantly changed from the prior study. Electronically Signed: Andrea Galarza MD  11/6/2023 7:07 AM EST  Workstation ID: [id number]    CT Abdomen Pelvis Without Contrast    Result Date: 11/5/2023  Impression: 1. Nonspecific fluid and mixed liquid stool throughout the abdomen without signs of overt obstruction or active inflammation. Correlate for diarrheal illness. 2. Increasing patchy airspace opacities in the lower lobes suspicious for pneumonia versus aspiration. 3. Retained contrast in the right kidney suggesting acute tubular necrosis. No hydronephrosis. 4. Findings of mild volume overload/third spacing including body wall edema, presacral edema and trace ascites. No drainable collection. 5. Other ancillary findings as above. Electronically Signed: Jermaine Heath MD  11/5/2023 11:15 AM EST  Workstation ID: [id number]    MRI Brain Without Contrast    Result Date: 11/5/2023  Impression: MRI findings suggesting diffuse anoxic brain injury. Electronically Signed: Jermaine Heath MD  11/5/2023 10:57 AM EST  Workstation ID: [id number]    XR Chest 1 View    Result Date: 11/5/2023  Impression: 1. Improving vascular congestion and pulmonary edema. 2. Support devices as above, including high ET tube 8.7 cm above andry similar in position to prior exam. Electronically Signed: Jermaine Heath MD  11/5/2023 8:33 AM EST  Workstation ID: [id number]    XR Chest 1 View    Result Date: 11/4/2023  Impression: Appropriately placed right internal jugular CVC catheter with the tip in the mid SVC. No pneumothorax. Mild  pulmonary edema pattern, improved as compared to the previous study. Electronically Signed: Caitlyn Urbina MD  11/4/2023 12:54 AM EDT  Workstation ID: [id number]    XR Chest 1 View    Result Date: 11/3/2023  Impression: Unchanged position of right PICC. Right subclavian dual-lumen venous catheter with the distal tip at the upper to mid SVC. Adequate position of endotracheal and enteric tube. Mildly improved patchy opacities throughout the lungs which may represent true improvement versus technical differences Electronically Signed: Ellis Gibson DO  11/3/2023 5:07 PM EDT  Workstation ID: [id number]    XR Abdomen KUB    Result Date: 11/3/2023  Impression: Feeding tube terminates in the stomach. Electronically Signed: Grey Newton MD  11/3/2023 3:21 AM EDT  Workstation ID: [id number]    XR Chest 1 View    Result Date: 11/3/2023  Impression: 1.Worsening diffuse bilateral airspace disease. 2.Endotracheal tube tip 4.6 cm above the andry. 3.Right upper extremity PICC terminates in the right brachiocephalic vein. Electronically Signed: Grey Newton MD  11/3/2023 3:21 AM EDT  Workstation ID: [id number]    CT Angiogram Chest Pulmonary Embolism    Result Date: 11/3/2023  Impression: 1.Multifocal pneumonia. 2.No evidence of pulmonary embolism. Electronically Signed: Grey Newton MD  11/3/2023 12:22 AM EDT  Workstation ID: [id number]    CT Head Without Contrast    Result Date: 11/3/2023  Impression: 1.Gray-white differentiation is present, but poor throughout the brain and cerebellum. This could be related to anoxic brain injury. This could be further evaluated with follow-up brain MRI or serial CT as indicated. 2.No acute intracranial hemorrhage. Electronically Signed: Grey Newton MD  11/3/2023 12:20 AM EDT  Workstation ID: [id number]    XR Chest 1 View    Result Date: 11/2/2023  Impression: 1.ET tube tip 5.3 cm above the andry. 2.Right apical airspace disease. Electronically Signed: Grey Newton MD  11/2/2023 11:37 PM EDT   Workstation ID: [id number]     Assessment/Plan:     Cardiac arrest with successful resuscitation    Marijuana abuse    Alcohol abuse    Elevated blood sugar       Acute kidney injury   Severe metabolic/ respiratory acidosis   Cardiopulmonary arrest   ARDS   Possible anoxic brain injury   Hyperglycemia   Elevated troponin   Hypokalemia   Hypernatremia   Lactic acidosis  Recommendations:  Dialysis was done yesterday  Urine output still low  Hemodialysis again tomorrow  Okay to use PICC line  Still on Levophed  Unable to tolerate p.o. with high residuals  Plan is to increase midodrine once he is able to tolerate p.o.

## 2023-11-09 NOTE — CASE MANAGEMENT/SOCIAL WORK
Continued Stay Note  [facility]     Patient Name: Yusuf Jin  MRN: [id number]  Today's Date: 11/9/2023    Admit Date: 11/2/2023    Plan: Dc Plan: Psychiatric accepted and following. Precert started 11/09/2023. No PASRR required. Trach/PEG 11/6/2023. Will require EMS transport.   Discharge Plan       Row Name 11/09/23 1422       Plan    Plan Dc Plan: Psychiatric accepted and following. Precert started 11/09/2023. No PASRR required. Trach/PEG 11/6/2023. Will require EMS transport.    Patient/Family in Agreement with Plan yes    Provided Post Acute Provider List? N/A    Provided Post Acute Provider Quality & Resource List? N/A    Plan Comments CM spoke with liaeyad Phan at [place] this morning and she is ready to start precert and CM agreed. CM reached out to patient's daughter Tamiko and son Butch to inform that precert has been started for [facility] with a possible expected DC day for tomorrow. Tamiko verbalized understanding. CM unable to reach Yusuf and  left with return call information. CM spoke with intensivist, nursing, and NP to obtain clinical upates in morning rounds. CM informed care team of DC plan.CM will continue to follow for any further needs and adjust discharge plan accordingly. DC Barriers: Vent/Trach, central line, arterial line, HD cath, FMS, PEG, D10 gtt, Levophed gtt, IV abx, abnormal labs, and peding echocardiogram.               Expected Discharge Date and Time       Expected Discharge Date Expected Discharge Time    Nov 10, 2023               Pauline Rm RN    Office Phone: [phone]  Office Cell:     [phone]

## 2023-11-09 NOTE — CONSULTS
Infectious Diseases Consult Note    Referring Provider: Victoriano Phillip DO    Reason for Consultation: Leukocytosis and sepsis    Patient Care Team:  Provider, No Known as PCP - General    Chief complaint unresponsive patient    Subjective     History of present illness:      This is 52-year-old male who was hospitalized [facility] on November 2, 2023.  Apparently patient had a cardiac arrest at home.  There was a suspicion of drug use.  The patient had multiple cardiac arrest prior to admission to ICU.  He remained unresponsive since and probably secondary to anoxic brain injury.  He is currently on the ventilator with tracheostomy tube and has a PEG tube.  He also was found to have acute kidney injury and being on hemodialysis and currently has a tunneled dialysis catheter which was placed by IR 2 days ago.  Patient was noted to have hypotension and required vasopressors and currently on low-dose of norepinephrine drip.  His white count is going up.  He has no bowel movement for 2 days and apparently had diarrhea earlier this admission and was screened negative for C. difficile toxin and antigen.  Patient has no significant secretions from the trach    Review of Systems   Review of Systems   Unable to perform ROS: Mental status change       Medications  Medications Prior to Admission   Medication Sig Dispense Refill Last Dose    methocarbamol (ROBAXIN) 750 MG tablet Take 1 tablet by mouth Every 8 (Eight) Hours As Needed (pain).          History  History reviewed. No pertinent past medical history.  Past Surgical History:   Procedure Laterality Date    ENDOSCOPY W/ PEG TUBE PLACEMENT N/A 11/6/2023    Procedure: ESOPHAGOGASTRODUODENOSCOPY WITH PERCUTANEOUS ENDOSCOPIC GASTROSTOMY TUBE INSERTION;  Surgeon: Rm Reeves MD;  Location: [facility] OR;  Service: General;  Laterality: N/A;    TRACHEOSTOMY N/A 11/6/2023    Procedure: TRACHEOSTOMY;  Surgeon: Rm Reeves MD;  Location: [facility]  OR;  Service: General;  Laterality: N/A;       Family History  History reviewed. No pertinent family history.    Social History       Allergies  Patient has no known allergies.    Objective     Vital Signs   Vital Signs (last 24 hours)         11/08 0700  11/09 0659 11/09 0700 11/09 1148   Most Recent      Temp (°F) 96.8 -  97.9      96.9     96.9 (36.1) 11/09 0800    Heart Rate 66 -  87    77 -  81     77 11/09 1101    Resp 16 -  31      30     30 11/09 1101    BP 96/55 -  147/91    117/77 -  121/84     117/80 11/09 1101    SpO2 (%) 95 -  100      100     100 11/09 1101    Oxygen Concentration (%)   40      40     40 11/09 0820            Physical Exam:  Physical Exam  Constitutional:       Comments: On the ventilator and unresponsive   HENT:      Mouth/Throat:      Mouth: Mucous membranes are dry.   Eyes:      Comments: Pupils are not reactive   Neck:      Comments: Presence of tracheostomy  Pulmonary:      Breath sounds: Wheezing and rales present.   Abdominal:      General: Abdomen is flat.      Palpations: Abdomen is soft.      Comments: Present bowel sounds    Presence of PEG tube   Musculoskeletal:      Cervical back: Neck supple.      Right lower leg: Edema present.      Left lower leg: Edema present.         Microbiology  Microbiology Results (last 10 days)       Procedure Component Value - Date/Time    Clostridioides difficile Toxin - Stool, Per Rectum [[id number]]  (Normal) Collected: 11/06/23 1251    Lab Status: Final result Specimen: Stool from Per Rectum Updated: 11/06/23 1339    Narrative:      The following orders were created for panel order Clostridioides difficile Toxin - Stool, Per Rectum.  Procedure                               Abnormality         Status                     ---------                               -----------         ------                     Clostridioides difficile...[[id number]]  Normal              Final result                 Please view results for these tests on the  individual orders.    Clostridioides difficile EIA - Stool, Per Rectum [[id number]]  (Normal) Collected: 11/06/23 1251    Lab Status: Final result Specimen: Stool from Per Rectum Updated: 11/06/23 1339     C Diff GDH Ag Negative     C.diff Toxin Ag Negative    Narrative:      The result indicates the absence of toxigenic C.difficile from stool specimen.    Blood Culture - Blood, Blood, Arterial Line [[id number]]  (Normal) Collected: 11/04/23 1425    Lab Status: Preliminary result Specimen: Blood, Arterial Line Updated: 11/08/23 1330     Blood Culture No growth at 4 days    Narrative:      Less than seven (7) mL's of blood was collected.  Insufficient quantity may yield false negative results.    Blood Culture - Blood, Arm, Right [[id number]]  (Normal) Collected: 11/04/23 1133    Lab Status: Final result Specimen: Blood from Arm, Right Updated: 11/09/23 1100     Blood Culture No growth at 5 days    MRSA Screen, PCR (Inpatient) - Swab, Nares [[id number]]  (Normal) Collected: 11/03/23 1828    Lab Status: Final result Specimen: Swab from Nares Updated: 11/03/23 1954     MRSA PCR No MRSA Detected    Narrative:      The negative predictive value of this diagnostic test is high and should only be used to consider de-escalating anti-MRSA therapy. A positive result may indicate colonization with MRSA and must be correlated clinically.    Respiratory Panel PCR w/COVID-19(SARS-CoV-2) [facility]AL/[facility] In-House, NP Swab in UTM/VTM, 3-4 HR TAT - Swab, Nasopharynx [[id number]]  (Normal) Collected: 11/03/23 1828    Lab Status: Final result Specimen: Swab from Nasopharynx Updated: 11/03/23 1928     ADENOVIRUS, PCR Not Detected     Coronavirus 229E Not Detected     Coronavirus HKU1 Not Detected     Coronavirus NL63 Not Detected     Coronavirus OC43 Not Detected     COVID19 Not Detected     Human Metapneumovirus Not Detected     Human Rhinovirus/Enterovirus Not Detected     Influenza A PCR Not Detected     Influenza B PCR Not  Detected     Parainfluenza Virus 1 Not Detected     Parainfluenza Virus 2 Not Detected     Parainfluenza Virus 3 Not Detected     Parainfluenza Virus 4 Not Detected     RSV, PCR Not Detected     Bordetella pertussis pcr Not Detected     Bordetella parapertussis PCR Not Detected     Chlamydophila pneumoniae PCR Not Detected     Mycoplasma pneumo by PCR Not Detected    Narrative:      In the setting of a positive respiratory panel with a viral infection PLUS a negative procalcitonin without other underlying concern for bacterial infection, consider observing off antibiotics or discontinuation of antibiotics and continue supportive care. If the respiratory panel is positive for atypical bacterial infection (Bordetella pertussis, Chlamydophila pneumoniae, or Mycoplasma pneumoniae), consider antibiotic de-escalation to target atypical bacterial infection.    Respiratory Culture - Sputum, ET Suction [[id number]] Collected: 11/03/23 0243    Lab Status: Final result Specimen: Sputum from ET Suction Updated: 11/05/23 1021     Respiratory Culture Scant growth (1+) Normal respiratory arnulfo. No S. aureus or Pseudomonas aeruginosa detected. Final report.     Gram Stain Many (4+) WBCs per low power field      Rare (1+) Epithelial cells per low power field      Few (2+) Mixed bacterial morphotypes seen on Gram Stain      Rare (1+) Yeast    Blood Culture - Blood, Arm, Right [[id number]]  (Abnormal) Collected: 11/02/23 5602    Lab Status: Final result Specimen: Blood from Arm, Right Updated: 11/05/23 0725     Blood Culture Staphylococcus, coagulase negative     Isolated from Aerobic and Anaerobic Bottles     Gram Stain Anaerobic Bottle Gram positive cocci in clusters      Aerobic Bottle Gram positive cocci in clusters    Narrative:      Probable contaminant requires clinical correlation, susceptibility not performed unless requested by physician.      Blood Culture ID, PCR - Blood, Arm, Right [[id number]]  (Abnormal) Collected:  11/02/23 2358    Lab Status: Edited Result - FINAL Specimen: Blood from Arm, Right Updated: 11/03/23 1854     BCID, PCR Staph spp, not aureus or lugdunensis. Identification by BCID2 PCR.     BOTTLE TYPE Anaerobic Bottle    Blood Culture - Blood, Arm, Left [[id number]]  (Normal) Collected: 11/02/23 2356    Lab Status: Final result Specimen: Blood from Arm, Left Updated: 11/07/23 2315     Blood Culture No growth at 5 days    Narrative:      Less than seven (7) mL's of blood was collected.  Insufficient quantity may yield false negative results.    COVID-19 and FLU A/B PCR - Swab, Nasopharynx [[id number]]  (Normal) Collected: 11/02/23 2354    Lab Status: Final result Specimen: Swab from Nasopharynx Updated: 11/03/23 0024     COVID19 Not Detected     Influenza A PCR Not Detected     Influenza B PCR Not Detected    Narrative:      Fact sheet for providers: https://www.fda.gov/media/466341/download    Fact sheet for patients: https://www.fda.gov/media/787973/download    Test performed by PCR.            Laboratory  Results from last 7 days   Lab Units 11/09/23  0614   WBC 10*3/mm3 20.20*   HEMOGLOBIN g/dL 8.7*   HEMATOCRIT % 26.0*   PLATELETS 10*3/mm3 90*     Results from last 7 days   Lab Units 11/09/23  0614   SODIUM mmol/L 138   POTASSIUM mmol/L 4.3   CHLORIDE mmol/L 107   CO2 mmol/L 23.0   BUN mg/dL 31*   CREATININE mg/dL 4.90*   GLUCOSE mg/dL 97   CALCIUM mg/dL 8.2*     Results from last 7 days   Lab Units 11/09/23  0614   SODIUM mmol/L 138   POTASSIUM mmol/L 4.3   CHLORIDE mmol/L 107   CO2 mmol/L 23.0   BUN mg/dL 31*   CREATININE mg/dL 4.90*   GLUCOSE mg/dL 97   CALCIUM mg/dL 8.2*     Results from last 7 days   Lab Units 11/04/23  0335   CK TOTAL U/L 2,950*               Radiology  Imaging Results (Last 72 Hours)       Procedure Component Value Units Date/Time    XR Abdomen KUB [[id number]] Collected: 11/09/23 0159     Updated: 11/09/23 0202    Narrative:      XR ABDOMEN KUB    Date of Exam: 11/9/2023 1:50 AM  EST    Indication: High tube feed residual volume/retention    Comparison: 11/8/2023.    Findings:  Gastrostomy tube present within the left hemiabdomen. No gross free air or pneumatosis though evaluation is slightly limited due to technique. There is a non obstructive bowel gas pattern. A mild stool burden is present.  No suspicious calcifications   identified. No acute osseous abnormality identified.      Impression:      Impression:  Nonobstructive bowel gas pattern. Mild stool burden present. Gastrostomy tube within the left hemiabdomen.        Electronically Signed: Caitlyn Urbina MD    11/9/2023 2:00 AM EST    Workstation ID: [id number]    XR Abdomen KUB [[id number]] Collected: 11/08/23 0842     Updated: 11/08/23 0846    Narrative:      XR ABDOMEN KUB    Date of Exam: 11/8/2023 8:36 AM EST    Indication: increased residuals    Comparison: CT abdomen/pelvis 11/5/2023    Findings:  Moderate gas is seen within the mid to lower abdominal bowel loops, thought to represent the colon. Left upper quadrant percutaneous catheter, thought to represent a gastrostomy tube, is newly placed since the 11/5/2023 CT. No free intraperitoneal air is   evident. Surgical clips are seen within the mid abdomen left of midline. No definite organomegaly or abnormal soft tissue calcification is identified. Peribronchiolar alveolar densities are present in the imaged mid to lower lung zones. A central line   extends to the caval atrial junction.        Impression:      Impression:    1. Moderate gas distention of mid abdominal bowel, thought to represent the colon. Findings may reflect changes of mild ileus.  2. No convincing evidence of high-grade bowel obstruction on this examination.  3. Percutaneous gastrostomy tube projects of the left upper quadrant of the abdomen, newly placed since the prior study. No gross free intraperitoneal air is evident.      Electronically Signed: Yaritza Chaparro MD    11/8/2023 8:44 AM EST    Workstation ID:  [id number]    IR Insert Tunneled CV Catheter Without Port 5 Plus [[id number]] Collected: 11/07/23 1115     Updated: 11/07/23 1118    Narrative:      DATE OF EXAM:  11/7/2023 10:48 AM EST    PROCEDURE:  IR INS TUNNELED CV CATHETER WO PORT 5 PLUS    INDICATIONS:  tunneled dialysis cath    COMPARISON:  No Comparisons Available    FLUOROSCOPIC TIME:  40 seconds    PHYSICIAN MONITORED CONSCIOUS SEDATION TIME:  None minutes    TECHNIQUE:   A detailed explanation of the procedure, including the risks, benefits, and alternatives was provided. A preprocedure timeout was performed. The interventional radiology nurse monitored the patient at all times and provided conscious sedation. The   patient was placed supine on the fluoroscopy table and the left neck and chest were prepped and draped using maximal sterile barrier technique. The skin and subcutaneous tissues were anesthetized with 1% lidocaine. A small skin incision was made. Next,   under direct ultrasound guidance access was obtained to the left internal jugular vein with a micropuncture kit. Ultrasound visualized the needle entering the internal jugular vein. Ultrasound demonstrated patent left internal jugular vein and an image   was obtained. A guidewire was manipulated centrally under fluoroscopic guidance.    Next, an appropriate exit site was marked, anesthetized and a small skin incision was made. The PermCath was then tunneled from the exit site to the access site.    The access site was then serially dilated to accommodate a peel-away sheath. The catheter was then placed through the peel-away sheath and the peel-away sheath and wire were removed. Fluoroscopy confirmed catheter tip in satisfactory position. Both ports   aspirated and flushed normally and were locked with heparinized saline solution. The catheter was secured to the skin with Prolene. The access site was closed with Dermabond and Steri-Strips. The patient tolerated the procedure well without  immediate   complication.    FINDINGS:  See above      Impression:        1. Successful placement of left IJ PermCath.       Electronically Signed: Agus Perez MD    11/7/2023 11:16 AM EST    Workstation ID: [id number]            Cardiology      Results Review:  I have reviewed all clinical data, test, lab, and imaging results.       Schedule Meds  cefepime, 2,000 mg, Intravenous, Once  cefepime, 2,000 mg, Intravenous, Q24H  chlorhexidine, 15 mL, Mouth/Throat, Q12H  lansoprazole, 30 mg, Per G Tube, Daily  midodrine, 10 mg, Per G Tube, Q8H  senna-docusate sodium, 2 tablet, Nasogastric, BID  sodium chloride, 10 mL, Intravenous, Q12H  sodium phosphate, 15 mmol, Intravenous, Once        Infusion Meds  dextrose, 25 mL/hr, Last Rate: 25 mL/hr (11/07/23 0943)  norepinephrine, 0.02-0.5 mcg/kg/min, Last Rate: 0.1 mcg/kg/min (11/09/23 0822)  Pharmacy to Dose Cefepime,         PRN Meds    acetaminophen **OR** acetaminophen    ALPRAZolam    artificial tears    bisacodyl    dextrose    dextrose    glucagon (human recombinant)    ipratropium-albuterol    nitroglycerin    ondansetron **OR** ondansetron    Pharmacy to Dose Cefepime    polyethylene glycol    Potassium Replacement - Follow Nurse / BPA Driven Protocol    sodium chloride    sodium chloride    sodium chloride      Assessment & Plan       Assessment    Reactive leukocytosis.  Patient is currently on low-dose of norepinephrine drip.  Etiology is not very clear but possibility of aspiration pneumonia is in differential diagnosis    S/p cardiac arrest.  I am suspecting anoxic brain injury.  Patient remained unresponsive since admission    Acute kidney injury.  Currently on hemodialysis with tunneled dialysis catheter placed on November 7, 2023    Plan    Continue IV cefepime 2 g daily for now.  Apparently was started recently patient was on Zosyn prior   Repeat 2 sets of blood cultures  Request lactic acid  Remove the current central line and send tip for culture  Okay to  place a PICC line since patient needs to be on pressors  Prognosis is very guarded but I will highly recommend comfort measures  A.m. labs  Case was discussed with ICU service      Liana Hernandez MD  11/09/23  11:48 EST    Note is dictated utilizing voice recognition software/Dragon

## 2023-11-10 NOTE — PROGRESS NOTES
"Enter Query Response Below      Query Response: Septic shock ruled out after study    Electronically signed by Jed Evans APRJODI, 11/10/23, 9:43 AM EST.       If applicable, please update the problem list.       Patient: Yusuf Jin        : 1970  Account: [id number]           Admit Date:         How to Respond to this query:       a. Click New Note     b. Answer query within the yellow box.                c. Update the Problem List, if applicable.      If you have any questions about this query contact me at: [email or web address]     Jed Evans APRJODI    52 year old male admitted  after accidental fentanyl overdose with cardiac arrest. Progress note  includes multifocal aspiration pneumonia. Progress notes beginning  document \"On admission, he also went into full-blown septic shock, treated with broad-spectrum antibiotics and needed multiple vasopressors.\" Assessment and plan includes cardiogenic shock...\"possibility of septic shock cannot be ruled out.\" CT abdomen \"suspicious for entercolitis.\" Clinical findings include WBC 7.5  (), 2.8 (11/3), 14.8 (); creatinine 1.33 (11/3), 6.7 (); lactic acid 7.7 ( 2345);     Treatment:  multiple vasopressors, IV cefepime (11/3-present), IV zosyn (11/3, -present),    Please clarify conflicting documentation as:    Septic shock due to ________, present on admission  Septic shock due to ________, developed after admission  Septic shock ruled out after study  Other- specify__________  Unable to determine      By submitting this query, we are merely seeking further clarification of documentation to accurately reflect all conditions that you are monitoring, evaluating, treating or that extend the hospitalization or utilize additional resources of care. Please utilize your independent clinical judgment when addressing the question(s) above.     This query and your response, once completed, will be entered into the legal medical " record.    Sincerely,  Radha Shen RN CCDS  Clinical Documentation Integrity Program

## 2023-11-10 NOTE — SIGNIFICANT NOTE
Spoke with daughter over the phone, discussed about patient's declined condition and loss of brainstem reflexes.  Also discussed about bedside clinical exam consistent with brain death.  Also discussed about brain flow scan showing no cerebral blood flow, which again is consistent with brain death.  Also discussed about neurology assessment and confirmation of brain death.    Daughter had a lot of questions on why he became brain-dead only today and not in the last few days.  Daughter also questioned why we thought his brain  only when he was ready to go to [place].  All of the questions were answered and informed her that patient was triggering the ventilator until yesterday which he lost today.  No other brainstem reflexes were present.  Also discussed about involuntary movements which could be a spinal reflex.  At the end of the conversation, daughter seem to be satisfied with all answers.  She is planning to come to the hospital today.  She asked when we are going to withdraw all care.  Informed her that we are going to stop all medicines and dialysis at this time.  However, agreed to keep him on the ventilator support until family gets a chance to visit him.  Except for her brother and patient's wife, she does not want any of the other family members to visit the patient unless approved by her.    Alex ( RN ) and Pauline ( CM)[facility] team were present during this entire telephonic conversation with daughter on a speaker phone.    Dr. Tommy Mendieta MD MPH  Staff Intensivist  11/10/23   14:47 EST

## 2023-11-10 NOTE — CASE MANAGEMENT/SOCIAL WORK
Discharge Planning Assessment  [facility]     Patient Name: Yusuf Jin  MRN: [id number]  Today's Date: 11/10/2023    Admit Date: 11/2/2023    Plan: DC Plan: Legal Terminal Extubation once family had visited. Braind death confirmed 13:50 on 11/10/2023 per Intensivist.       Discharge Plan       Row Name 11/10/23 1721       Plan    Plan DC Plan: Legal Terminal Extubation once family had visited. Braind death confirmed 13:50 on 11/10/2023 per Intensivist.    Patient/Family in Agreement with Plan no    Provided Post Acute Provider List? N/A    Provided Post Acute Provider Quality & Resource List? N/A    Plan Comments Update: CM was informed by intensivist that 2 physician assessment and brain flow scan have confirmed brain death for this patient at 13:50 today. Patient will be extubated once family able to have adequate time to come and visit. G[facility]O alanis.  confirms patient will be a 's case and an autopsy will be done after cardiac death. SAMREEN spoke with nursing and charge nurse to ensure they are aware not to remove any lines or tubes from the body after death, and someone must remain present in the room during dying process with family to ensure integrity of any investigations per [facility] instructions. Dr. Mendieta spoke with patient's daughter Tamiko via telephone with myself and nurse Alex newell to inform of this unfortunate finding and actions to follow. CM informed liaison at [facility] of change in care plan. CM additionally informed Archie SOLIS of plan as well. CM will remain available for any additional needs.              Pauline Rm RN     Office Phone: [phone]  Office Cell:     [phone]

## 2023-11-10 NOTE — PROGRESS NOTES
Critical Care Progress Note   Yusuf Jin : 1970 MRN:[id number] LOS:7     Principal Problem: Cardiac arrest with successful resuscitation     Reason for follow up: All the medical problems listed below    Summary     A 52 y.o. male with PMH of marijuana abuse and alcoholism presented to the hospital after a witnessed cardiac arrest and was admitted with a principal diagnosis of Cardiac arrest with successful resuscitation. Apparently, his marijuana was tested positive for fentanyl on a home kit, done by his sister.  Initial rhythm of asystole, unknown duration of CPR by EMS, multiple rounds of CPR in ED.  Intubated in the ED for hypoxic respiratory failure.   CT angio showed multifocal pneumonia.  CT head on admission with poor gray-white differentiation.  MRI brain on  showed diffuse anoxic brain injury.  Neurology was consulted and deemed poor prognosis.    On admission, he also went into full-blown septic shock, treated with broad-spectrum antibiotics and needed multiple vasopressors.  Also had CONOR with metabolic acidosis, treated with bicarb drip and nephrology was consulted.  Eventually needed dialysis and had tunneled HD catheter placement. Subsequently, also developed right hand ischemia due to radial artery dissection, vascular surgery was consulted and recommended conservative management.    Palliative care consulted for goals of care discussion.  Family opted for trach/PEG and LTAC placement.  Patient is currently awaiting placement in [place].    Significant events     11/10/23 : Patient showing no signs of brainstem activity.  Discussed with neurology, requested them to pursue brain death testing.  It would be highly unethical to continue current care especially if everything points towards brain death.    Assessment / Plan     Cardiac arrest  Presumed to be from substance abuse.  Echocardiogram from 11/3 with LVEF of 26 to 30%.  Repeat echo on  showed improvement in LVEF to  45%.    Septic shock  Still requiring norepinephrine.    Possibility of cardiogenic shock cannot be ruled out.  CT abdomen suspicious for enterocolitis.  Stool for C. difficile was negative.  Currently on cefepime.  ID following.  Procalcitonin significantly elevated around 86.      Acute Systolic heart failure:   Currently decompensated.   Last ECHO showed an EF of 26-30%. Repeat echocardiogram from 11/9 showed improvement in LVEF to 45%.  Unclear etiology.  Eventually will need ischemic evaluation, can pursue as an outpatient.  Monitor Input/Output very closely. Follow daily weights.   Net IO Since Admission: 12,829.15 mL [11/10/23 1054]    Acute respiratory failure with hypoxia and hypercapnia:   Likely due to pneumonia.  Ventilator settings noted and adjusted as needed.    Remains obtunded, failed multiple spontaneous breathing trials.  Currently not triggering the ventilator.    Acute Kidney Injury:   Remains anuric.   Likely ATN from shock state.    Nephrology following, s/p tunneled HD catheter placement.  Monitor Input/Output very closely.   Net IO Since Admission: 12,829.15 mL [11/10/23 1054]     Anoxic brain injury  MRI on 11/5 showed diffuse global anoxic brain injury.    Neurology deemed poor long-term prognosis.  Not triggering the ventilator even when the rate was set to 3/min.  No cough reflex, no gag reflex, no corneal reflex.  Pupils are fixed and dilated.  No response to deep pain.  Some involuntary head movement, ?  Spinal automatism.  Discussed with Dr. Lainez, defer to him about assessing it.    Right radial artery dissection  Vascular surgery following, recommended conservative management.    Acute thrombocytopenia  Unclear etiology.  HIT antibody negative.  Platelet counts are improving.  Likely due to hemodilution, given patient is on 13 L positive fluid balance.  Normal INR, DIC ruled out.    Normocytic anemia: Likely from hemodilution, hemoglobin dropped from 13.6 -> 9 and remained stable  since then.  Possible upper GI bleed given dark stools.  Poor candidate for any invasive work-up at this time.  Continue with Protonix twice daily.    Hypoglycemia: Advance tube feeds and likely can DC D10 drip.    Coagulase-negative staph bacteremia: Likely contaminant, no further work-up needed.  DC vancomycin.    Transaminitis/shock liver: Liver enzymes improving.    Code status:   Level Of Support Discussed With: Next of Kin (If No Surrogate)  Code Status (Patient has no pulse and is not breathing): CPR (Attempt to Resuscitate)  Medical Interventions (Patient has pulse or is breathing): Full Support       Nutrition: NPO Diet NPO Type: Strict NPO   Diet, Tube Feeding Tube Feeding Formula: Peptamen AF (Vital AF 1.2); Tube Feeding Type: Continuous; Continuous Tube Feeding Start Rate (mL/hr): 20; Then Advance Rate By (mL/hr): Do Not Advance; Every __ Hours: Patient at Goal Rate; To Goal Rate of...    DVT prophylaxis:  Mechanical DVT prophylaxis orders are present.     Subjective / Review of systems     Review of Systems   Intubated and obtunded  Objective / Physical Exam   Vital signs:  Temp: 96.6 °F (35.9 °C)  BP: 144/85  Heart Rate: 66  Resp: (!) 32  SpO2: 100 %  Weight: 88.4 kg (194 lb 14.2 oz)    Admission Weight: Weight: 72 kg (158 lb 11.7 oz)  Current Weight: Weight: 88.4 kg (194 lb 14.2 oz)    Input/Output in last 24 hours:    Intake/Output Summary (Last 24 hours) at 11/10/2023 1054  Last data filed at 11/10/2023 0800  Gross per 24 hour   Intake 797 ml   Output 700 ml   Net 97 ml      Physical Exam  Vitals and nursing note reviewed.   Constitutional:       Appearance: Normal appearance.      Interventions: He is intubated.   HENT:      Head: Normocephalic and atraumatic.      Mouth/Throat:      Mouth: Mucous membranes are moist.      Pharynx: Oropharynx is clear. No oropharyngeal exudate.      Comments: ET tube in place  Eyes:      General: No scleral icterus.     Conjunctiva/sclera: Conjunctivae normal.    Neck:      Comments: Tracheostomy in place.  Cardiovascular:      Rate and Rhythm: Normal rate.      Pulses: Normal pulses.      Heart sounds: No murmur heard.     No gallop.   Pulmonary:      Effort: Pulmonary effort is normal. He is intubated.      Breath sounds: Rales (Bibasilar) present. No wheezing.   Abdominal:      General: Bowel sounds are normal.      Palpations: Abdomen is soft.      Tenderness: There is no abdominal tenderness.      Comments: PEG tube in place.   Musculoskeletal:      Right lower leg: No edema.      Left lower leg: No edema.   Skin:     Comments: Right chest tunneled catheter     Neurological:      Comments: Obtunded, no response to pain.  No cough reflex, no gag reflex, no corneal reflex, pupils are fixed and dilated.  Not triggering the ventilator.  No doll's eyes.        Radiology and Labs     Results from last 7 days   Lab Units 11/10/23  0259 11/09/23 2002 11/09/23  0614 11/08/23 0443 11/07/23  0452 11/06/23  0902 11/06/23  0427   WBC 10*3/mm3 18.30*  --  20.20* 14.80* 11.70*  --  8.40   HEMATOCRIT % 25.4* 24.6* 26.0* 24.6* 27.9*   < > 27.1*   PLATELETS 10*3/mm3 98*  --  90* 75* 59*  --  53*    < > = values in this interval not displayed.      Results from last 7 days   Lab Units 11/10/23  0259 11/09/23  1302 11/09/23  0614 11/08/23  2030 11/08/23 0443 11/07/23  0452   SODIUM mmol/L 137 138 138  --  140 136   POTASSIUM mmol/L 4.2 4.4 4.3 3.3* 2.8* 3.8   CHLORIDE mmol/L 107 106 107  --  107 104   CO2 mmol/L 21.0* 23.0 23.0  --  21.0* 22.0   BUN mg/dL 40* 34* 31*  --  50* 37*   CREATININE mg/dL 6.17* 5.39* 4.90*  --  6.19* 4.41*      Current medications     Scheduled Meds:   cefepime, 2,000 mg, Intravenous, Q24H  chlorhexidine, 15 mL, Mouth/Throat, Q12H  midodrine, 10 mg, Per G Tube, Q8H  pantoprazole, 40 mg, Intravenous, Q12H  senna-docusate sodium, 2 tablet, Nasogastric, BID  sodium chloride, 10 mL, Intravenous, Q12H      Continuous Infusions:   dextrose, 25 mL/hr, Last Rate: 25  mL/hr (11/07/23 0943)  norepinephrine, 0.02-0.5 mcg/kg/min, Last Rate: 0.04 mcg/kg/min (11/10/23 0254)  Pharmacy to Dose Cefepime,       Plan discussed with RN. Reviewed all other data in the last 24 hours, including but not limited to vitals, labs, microbiology, imaging and pertinent notes from other providers. High complexity decision making and high risk of deterioration.    Tommy Mendieta MD   Critical Care  11/10/23   10:54 EST

## 2023-11-10 NOTE — PROGRESS NOTES
Infectious Diseases Progress Note      LOS: 7 days   Patient Care Team:  Provider, No Known as PCP - General    Chief Complaint: Unresponsive    Subjective     The patient remained afebrile during the last 24 hours.  He remained on Norepinephrine drip.  He is currently on the ventilator.  Review of Systems:   Review of Systems   Unable to perform ROS: Patient unresponsive        Objective     Vital Signs  Temp:  [96.6 °F (35.9 °C)-99.3 °F (37.4 °C)] 96.6 °F (35.9 °C)  Heart Rate:  [60-77] 66  Resp:  [20-32] 32  BP: ()/() 144/85  FiO2 (%):  [40 %] 40 %    Physical Exam:  Physical Exam  Constitutional:       Comments: On the ventilator.  Patient is unresponsive with spontaneous movement of his head   HENT:      Head: Atraumatic.      Mouth/Throat:      Mouth: Mucous membranes are dry.   Eyes:      Comments: Pupils are not reactive   Neck:      Comments: Presence of tracheostomy  Cardiovascular:      Rate and Rhythm: Normal rate and regular rhythm.   Pulmonary:      Breath sounds: Rales present.   Abdominal:      General: Abdomen is flat.      Palpations: Abdomen is soft.      Comments: Absent bowel sounds   Musculoskeletal:      Cervical back: Neck supple.      Right lower leg: Edema present.      Left lower leg: Edema present.   Neurological:      Comments: No corneal or gag reflex          Results Review:    I have reviewed all clinical data, test, lab, and imaging results.     Radiology  XR Chest 1 View    Result Date: 11/10/2023  XR CHEST 1 VW Date of Exam: 11/10/2023 5:48 AM EST Indication: Hypoxia Comparison: 11/6/2023 Findings: ET tube and NG tube removed. Tracheostomy tube above the andry. Right upper extremity PICC line is at the proximal right atrium. Left central venous catheter tip at the cavoatrial junction. Heart size normal. Central pulmonary vascular ingestion with bilateral hilar airspace opacities and interstitial thickening suggesting pulmonary edema. No significant effusion. Osseous  structures grossly intact.     Impression: 1. Placement of tracheostomy tube with tip above the andry. 2. NG tube removed. 3. Stable right upper extremity PICC and left sided central venous catheter. 4. Stable findings suggesting pulmonary edema. Electronically Signed: Andrea Galarza MD  11/10/2023 7:15 AM EST  Workstation ID: [id number]     Cardiology    Laboratory    Results from last 7 days   Lab Units 11/10/23  0259 11/09/23  2002 11/09/23  0614 11/08/23  0443 11/07/23  0452 11/06/23  0902 11/06/23  0427 11/05/23  2342 11/05/23  1609 11/05/23  0756 11/05/23  0030   WBC 10*3/mm3 18.30*  --  20.20* 14.80* 11.70*  --  8.40  --  8.10  --  10.70   HEMOGLOBIN g/dL 8.6* 8.2* 8.7* 8.3* 9.3* 9.5* 9.3*   < > 9.6*   < > 12.0*   HEMATOCRIT % 25.4* 24.6* 26.0* 24.6* 27.9* 27.9* 27.1*   < > 28.3*   < > 34.8*   PLATELETS 10*3/mm3 98*  --  90* 75* 59*  --  53*  --  54*  --  81*    < > = values in this interval not displayed.     Results from last 7 days   Lab Units 11/10/23  0259 11/09/23  1302 11/09/23  0614 11/08/23  2030 11/08/23  0443 11/07/23  0452 11/06/23  0427 11/05/23 2001 11/05/23  1202 11/05/23  0756 11/05/23  0030 11/04/23  0831 11/04/23  0335   SODIUM mmol/L 137 138 138  --  140 136 137 138 139 137 140   < > 144   POTASSIUM mmol/L 4.2 4.4 4.3 3.3* 2.8* 3.8 3.4* 4.4 4.5 4.3 4.8   < > 4.4   CHLORIDE mmol/L 107 106 107  --  107 104 101 103 104 103 104   < > 109*   CO2 mmol/L 21.0* 23.0 23.0  --  21.0* 22.0 27.0 25.0 25.0 26.0 24.0   < > 21.0*   BUN mg/dL 40* 34* 31*  --  50* 37* 34* 27* 18 13 14   < > 20   CREATININE mg/dL 6.17* 5.39* 4.90*  --  6.19* 4.41* 3.68* 3.03* 2.28* 1.77* 1.94*   < > 2.70*   GLUCOSE mg/dL 91 92 97  --  111* 120* 110* 117* 88 107* 127*   < > 101*   ALBUMIN g/dL 1.9*  --  2.3*  --  2.4* 2.6* 2.5*  --  2.8* 2.9* 2.9*   < > 2.8*   BILIRUBIN mg/dL 0.5  --  0.6  --  0.6 0.7 0.7  --   --   --  1.0  --  0.7   ALK PHOS U/L 100  --  116  --  108 154* 68  --   --   --  45  --  43   AST (SGOT) U/L 62*  --   85*  --  106* 223* 380*  --   --   --  632*  --  1,287*   ALT (SGPT) U/L 72*  --  89*  --  115* 198* 290*  --   --   --  385*  --  511*   CALCIUM mg/dL 8.5* 8.4* 8.2*  --  7.7* 7.7* 7.9* 8.2* 9.0 9.3 8.7   < > 7.7*    < > = values in this interval not displayed.     Results from last 7 days   Lab Units 11/04/23  0335   CK TOTAL U/L 2,950*             Microbiology   Microbiology Results (last 10 days)       Procedure Component Value - Date/Time    Catheter Culture - Cath Tip, Hand, Left [[id number]] Collected: 11/09/23 1640    Lab Status: Preliminary result Specimen: Cath Tip from Hand, Left Updated: 11/10/23 1041     CATHETER CULTURE No growth    Clostridioides difficile Toxin - Stool, Per Rectum [[id number]]  (Normal) Collected: 11/06/23 1251    Lab Status: Final result Specimen: Stool from Per Rectum Updated: 11/06/23 1339    Narrative:      The following orders were created for panel order Clostridioides difficile Toxin - Stool, Per Rectum.  Procedure                               Abnormality         Status                     ---------                               -----------         ------                     Clostridioides difficile...[[id number]]  Normal              Final result                 Please view results for these tests on the individual orders.    Clostridioides difficile EIA - Stool, Per Rectum [[id number]]  (Normal) Collected: 11/06/23 1251    Lab Status: Final result Specimen: Stool from Per Rectum Updated: 11/06/23 1339     C Diff GDH Ag Negative     C.diff Toxin Ag Negative    Narrative:      The result indicates the absence of toxigenic C.difficile from stool specimen.    Blood Culture - Blood, Blood, Arterial Line [[id number]]  (Normal) Collected: 11/04/23 1425    Lab Status: Final result Specimen: Blood, Arterial Line Updated: 11/09/23 1330     Blood Culture No growth at 5 days    Narrative:      Less than seven (7) mL's of blood was collected.  Insufficient quantity may yield false negative  results.    Blood Culture - Blood, Arm, Right [[id number]]  (Normal) Collected: 11/04/23 1133    Lab Status: Final result Specimen: Blood from Arm, Right Updated: 11/09/23 1100     Blood Culture No growth at 5 days    MRSA Screen, PCR (Inpatient) - Swab, Nares [[id number]]  (Normal) Collected: 11/03/23 1828    Lab Status: Final result Specimen: Swab from Nares Updated: 11/03/23 1954     MRSA PCR No MRSA Detected    Narrative:      The negative predictive value of this diagnostic test is high and should only be used to consider de-escalating anti-MRSA therapy. A positive result may indicate colonization with MRSA and must be correlated clinically.    Respiratory Panel PCR w/COVID-19(SARS-CoV-2) [facility] In-House, NP Swab in UTM/VTM, 3-4 HR TAT - Swab, Nasopharynx [[id number]]  (Normal) Collected: 11/03/23 1828    Lab Status: Final result Specimen: Swab from Nasopharynx Updated: 11/03/23 1928     ADENOVIRUS, PCR Not Detected     Coronavirus 229E Not Detected     Coronavirus HKU1 Not Detected     Coronavirus NL63 Not Detected     Coronavirus OC43 Not Detected     COVID19 Not Detected     Human Metapneumovirus Not Detected     Human Rhinovirus/Enterovirus Not Detected     Influenza A PCR Not Detected     Influenza B PCR Not Detected     Parainfluenza Virus 1 Not Detected     Parainfluenza Virus 2 Not Detected     Parainfluenza Virus 3 Not Detected     Parainfluenza Virus 4 Not Detected     RSV, PCR Not Detected     Bordetella pertussis pcr Not Detected     Bordetella parapertussis PCR Not Detected     Chlamydophila pneumoniae PCR Not Detected     Mycoplasma pneumo by PCR Not Detected    Narrative:      In the setting of a positive respiratory panel with a viral infection PLUS a negative procalcitonin without other underlying concern for bacterial infection, consider observing off antibiotics or discontinuation of antibiotics and continue supportive care. If the respiratory panel is positive for atypical  bacterial infection (Bordetella pertussis, Chlamydophila pneumoniae, or Mycoplasma pneumoniae), consider antibiotic de-escalation to target atypical bacterial infection.    Respiratory Culture - Sputum, ET Suction [[id number]] Collected: 11/03/23 0243    Lab Status: Final result Specimen: Sputum from ET Suction Updated: 11/05/23 1021     Respiratory Culture Scant growth (1+) Normal respiratory arnulfo. No S. aureus or Pseudomonas aeruginosa detected. Final report.     Gram Stain Many (4+) WBCs per low power field      Rare (1+) Epithelial cells per low power field      Few (2+) Mixed bacterial morphotypes seen on Gram Stain      Rare (1+) Yeast    Blood Culture - Blood, Arm, Right [[id number]]  (Abnormal) Collected: 11/02/23 2358    Lab Status: Final result Specimen: Blood from Arm, Right Updated: 11/05/23 0725     Blood Culture Staphylococcus, coagulase negative     Isolated from Aerobic and Anaerobic Bottles     Gram Stain Anaerobic Bottle Gram positive cocci in clusters      Aerobic Bottle Gram positive cocci in clusters    Narrative:      Probable contaminant requires clinical correlation, susceptibility not performed unless requested by physician.      Blood Culture ID, PCR - Blood, Arm, Right [[id number]]  (Abnormal) Collected: 11/02/23 2358    Lab Status: Edited Result - FINAL Specimen: Blood from Arm, Right Updated: 11/03/23 1854     BCID, PCR Staph spp, not aureus or lugdunensis. Identification by BCID2 PCR.     BOTTLE TYPE Anaerobic Bottle    Blood Culture - Blood, Arm, Left [[id number]]  (Normal) Collected: 11/02/23 2356    Lab Status: Final result Specimen: Blood from Arm, Left Updated: 11/07/23 2315     Blood Culture No growth at 5 days    Narrative:      Less than seven (7) mL's of blood was collected.  Insufficient quantity may yield false negative results.    COVID-19 and FLU A/B PCR - Swab, Nasopharynx [[id number]]  (Normal) Collected: 11/02/23 2354    Lab Status: Final result Specimen: Swab from  Nasopharynx Updated: 11/03/23 0024     COVID19 Not Detected     Influenza A PCR Not Detected     Influenza B PCR Not Detected    Narrative:      Fact sheet for providers: https://www.fda.gov/media/555659/download    Fact sheet for patients: https://www.fda.gov/media/007089/download    Test performed by PCR.            Medication Review:       Schedule Meds  cefepime, 2,000 mg, Intravenous, Q24H  chlorhexidine, 15 mL, Mouth/Throat, Q12H  midodrine, 10 mg, Per G Tube, Q8H  pantoprazole, 40 mg, Intravenous, Q12H  senna-docusate sodium, 2 tablet, Nasogastric, BID  sodium chloride, 10 mL, Intravenous, Q12H        Infusion Meds  dextrose, 25 mL/hr, Last Rate: 25 mL/hr (11/07/23 0943)  norepinephrine, 0.02-0.5 mcg/kg/min, Last Rate: 0.04 mcg/kg/min (11/10/23 0254)  Pharmacy to Dose Cefepime,         PRN Meds    acetaminophen **OR** acetaminophen    ALPRAZolam    artificial tears    bisacodyl    dextrose    dextrose    glucagon (human recombinant)    ipratropium-albuterol    nitroglycerin    ondansetron **OR** ondansetron    Pharmacy to Dose Cefepime    Phosphorus Replacement - Follow Nurse / BPA Driven Protocol    polyethylene glycol    Potassium Replacement - Follow Nurse / BPA Driven Protocol    sodium chloride    sodium chloride    sodium chloride        Assessment & Plan       Antimicrobial Therapy   1.  IV cefepime        2.        3.        4.        5.            Assessment     Reactive leukocytosis.  Patient is currently on low-dose of norepinephrine drip.  Etiology is not very clear but possibility of aspiration pneumonia is in differential diagnosis.  White count is improving     S/p cardiac arrest.  I am suspecting anoxic brain injury.  Patient remained unresponsive since admission.  Patient has no gag or corneal reflex and he is not having spontaneous breathing.  He is suspected to be brain dead     Acute kidney injury.  Currently on hemodialysis with tunneled dialysis catheter placed on November 7, 2023      Plan     Continue IV cefepime 2 g daily for now.   Prognosis is poor and highly recommend comfort measures  A.m. labs  Case was discussed with ICU service and neurology service           Liana Hernandez MD  11/10/23  11:26 EST    Note is dictated utilizing voice recognition software/Dragon

## 2023-11-10 NOTE — CASE MANAGEMENT/SOCIAL WORK
Continued Stay Note  BAKARI Burger     Patient Name: Yusuf Jin  MRN: [id number]  Today's Date: 11/10/2023    Admit Date: 11/2/2023    Plan: Dc Plan: [facility] accepted and following. Precert started 11/09/2023. No PASRR required. Trach/PEG 11/6/2023. Will require EMS transport.   Discharge Plan       Row Name 11/10/23 1000       Plan    Plan Dc Plan: [facility] accepted and following. Precert started 11/09/2023. No PASRR required. Trach/PEG 11/6/2023. Will require EMS transport.    Provided Post Acute Provider List? N/A    Provided Post Acute Provider Quality & Resource List? N/A    Plan Comments CM spoke with intensivist, nursing, and NP to obtain clinical upates in morning rounds. Intensivist will speak with Neurology today to potentially order scans to determine brain death as patient has not regained any reflexes at this time. Patient also still requiring IV pressor and MD concerned for infection. If brain death is confirmed then patient will be terminally extubated. If not, the DC will be delayed until infection determined and Levophed gtt off potentially. MD wants to continue to move forward with precert approval. CM informed liaison Emilie of current plan. CM will continue to follow for any additional needs. DC Barriers: Vent/Trach, PICC, arterial line, HD cath, D10gtt, Levophed gtt, IV abx, IV Reglan, Abnormal labs, Pending cultures, Pending imaging results, and pending precert approval.               Expected Discharge Date and Time       Expected Discharge Date Expected Discharge Time    Nov 12, 2023               Pauline Rm RN     Office Phone: [phone]  Office Cell:     [phone]

## 2023-11-10 NOTE — CASE MANAGEMENT/SOCIAL WORK
Social Work Assessment  [facility]     Patient Name: Yusuf Jin  MRN: [id number]  Today's Date: 11/10/2023    Admit Date: 11/2/2023     Discharge Plan       Row Name 11/10/23 1431       Plan    Plan Comments SW emailed Risk Management regarding family dynamics in the event future needs or questions arise.      WILL Terry, MSW    Phone: [phone]  Fax: [phone]  Email: [email or web address]

## 2023-11-10 NOTE — DISCHARGE SUMMARY
Death Summary     Yusuf Jin 1970 [id number]     Date of Admission:11/2/2023     Date of Discharge: 11/10/23     Admission Diagnosis: Anoxic brain injury [G93.1]  History of sudden cardiac arrest successfully resuscitated [Z86.74]  Accidental fentanyl overdose, initial encounter [T40.411A]  Cardiac arrest with successful resuscitation [I46.9]     Discharge Diagnosis:     Cardiac arrest with successful resuscitation    Marijuana abuse    Alcohol abuse    Elevated blood sugar       Consults:  Neurology, nephrology, ID , general surgery, palliative care    Cause of death: Cardiac arrest due to marijuana abuse  Time of death:  11/10/2023 @ 1.50PM    Hospital Course: A 52 y.o. male with PMH of marijuana abuse and alcoholism presented to the hospital after a witnessed cardiac arrest and was admitted with a principal diagnosis of Cardiac arrest with successful resuscitation. Apparently, his marijuana was tested positive for fentanyl on a home kit, done by his sister.  Initial rhythm of asystole, unknown duration of CPR by EMS, multiple rounds of CPR in ED.  Intubated in the ED for hypoxic respiratory failure.   CT angio showed multifocal pneumonia.  CT head on admission with poor gray-white differentiation.  MRI brain on 11/5 showed diffuse anoxic brain injury.  Neurology was consulted and deemed poor prognosis.     On admission, he also went into full-blown septic shock, treated with broad-spectrum antibiotics and needed multiple vasopressors.  Also had CONOR with metabolic acidosis, treated with bicarb drip and nephrology was consulted.  Eventually needed dialysis and had tunneled HD catheter placement. Subsequently, also developed right hand ischemia due to radial artery dissection, vascular surgery was consulted and recommended conservative management.     Palliative care consulted for goals of care discussion.  Family opted for trach/PEG and LTAC placement.  Subsequently, patient progressed to brain  death.  Brain death testing was done on 11/10/2023 by , brain flow study showed no cerebral blood flow.  Discussed with Dr. Sher and patient is declared brain death.    Tommy Mendieta MD  Critical Care  11/10/23   14:14 EST

## 2023-11-10 NOTE — CONSULTS
Nutrition Services    Patient Name: Yusuf Jin  YOB: 1970  MRN: [id number]  Admission date: 11/2/2023    EN Prescription: Peptamen AF at 20 mL/hour + 10 mL/hour water flush      PPE Documentation        PPE Worn By Provider gloves   PPE Worn By Patient  None     CLINICAL NUTRITION ASSESSMENT      Reason for Assessment Follow up protocol   11/14: Consult for tube feeding      H&P      History reviewed. No pertinent past medical history.    Past Surgical History:   Procedure Laterality Date    ENDOSCOPY W/ PEG TUBE PLACEMENT N/A 11/6/2023    Procedure: ESOPHAGOGASTRODUODENOSCOPY WITH PERCUTANEOUS ENDOSCOPIC GASTROSTOMY TUBE INSERTION;  Surgeon: Rm Reeves MD;  Location: [facility] MAIN OR;  Service: General;  Laterality: N/A;    TRACHEOSTOMY N/A 11/6/2023    Procedure: TRACHEOSTOMY;  Surgeon: Rm Reeves MD;  Location: [facility] MAIN OR;  Service: General;  Laterality: N/A;        Current Problems   Cardiopulmonary arrest with successful resuscitation;  Cardiogenic shock; Pulmonary edema  -Etiology suspected to be drug overdose of fentanyl and synthetic marijuana  -on pressors  - intubated 11/2  - S/P trach/PEG 11/6     Acute hypoxemic respiratory failure; Multifocal aspiration PNA  -pulmonology following   - remains on vent      Acute hypoxic encephalopathy; Global anoxic brain injury  -Neurology following     CONOR 2/2 ATN; Hyperkalemia; Rhabdomyolysis  -Nephrology following  -on CRRT      Hypoglycemia  -Pt on GTT      Possible GP bacteremia       Encounter Information        Trending Narrative     11/10: Since last RD review, patient S/P trach/PEG, TF toleration issues related to elevated residuals.  Patient discussed in AM rounds.  MD desires to restart TF today, RD to change formula for hopeful better toleration.  Noted with plans to transfer to [place] when stable enough.  Noted per MD, family would like to give patient three weeks to show signs of recovery and then may consult [facility].  " RD visited patient at bedside.  No family present.  NFPE completed and not consistent with nutrition diagnosis of malnutrition at this time using AND/ASPEN criteria.  Noted patient received a Phosphorus replacement today.      11/4: Received consult for tube feeding. RD saw pt yesterday 11/3 also for TF evaluation, but at that time was too unstable to begin enteral nutrition. Today, pt still is requiring multiple pressors -- currently on levophed, kelle and vasopressin, as well as CRRT. MAP at 1310 (half an hour ago) was only 48. At this point, hesitate to initiate even low rate of feeding due to three pressors and hemodynamic instability. Do not want to increase risk of GI ischemia. Will continue to check in on patient later today and potentially start trickle feed later if stable enough to feed. Did not complete NFPE on this occasion due to other cares being conducted - will follow up.     Anthropometrics        Current Height, Weight Height: 175.3 cm (69\")  Weight: 88.4 kg (194 lb 14.2 oz) (11/10/23 0500)       Usual Body Weight (UBW) UTD       Trending Weight Hx     This admission: 11/4: Scale weight 74.8 kg              PTA: 11/10: 14.7% weight gain since last RD review, +12.7L per I/O since admission   11/4: No extended weight Hx available     Wt Readings from Last 30 Encounters:   11/10/23 0500 88.4 kg (194 lb 14.2 oz)   11/09/23 0855 88.9 kg (196 lb)   11/09/23 0621 89.1 kg (196 lb 6.9 oz)   11/08/23 0502 89.3 kg (196 lb 13.9 oz)   11/07/23 0600 84.8 kg (186 lb 15.2 oz)   11/06/23 0454 82.8 kg (182 lb 8.7 oz)   11/05/23 0532 80.5 kg (177 lb 7.5 oz)   11/04/23 1003 74.8 kg (165 lb)   11/03/23 0110 74.9 kg (165 lb 2 oz)   11/02/23 2345 72 kg (158 lb 11.7 oz)      BMI kg/m2 Body mass index is 28.78 kg/m².       Labs        Pertinent Labs    Results from last 7 days   Lab Units 11/10/23  0259 11/09/23  1302 11/09/23  0614 11/08/23  2030 11/08/23  0443   SODIUM mmol/L 137 138 138  --  140   POTASSIUM mmol/L 4.2 " 4.4 4.3   < > 2.8*   CHLORIDE mmol/L 107 106 107  --  107   CO2 mmol/L 21.0* 23.0 23.0  --  21.0*   BUN mg/dL 40* 34* 31*  --  50*   CREATININE mg/dL 6.17* 5.39* 4.90*  --  6.19*   CALCIUM mg/dL 8.5* 8.4* 8.2*  --  7.7*   BILIRUBIN mg/dL 0.5  --  0.6  --  0.6   ALK PHOS U/L 100  --  116  --  108   ALT (SGPT) U/L 72*  --  89*  --  115*   AST (SGOT) U/L 62*  --  85*  --  106*   GLUCOSE mg/dL 91 92 97  --  111*    < > = values in this interval not displayed.     Results from last 7 days   Lab Units 11/10/23  0259 11/09/23  2002 11/09/23  1302 11/09/23  0614   MAGNESIUM mg/dL 1.8  --  1.8 1.8   PHOSPHORUS mg/dL 1.7*  --   --  0.7*   HEMOGLOBIN g/dL 8.6*   < >  --  8.7*   HEMATOCRIT % 25.4*   < >  --  26.0*    < > = values in this interval not displayed.     Lab Results   Component Value Date    HGBA1C 5.10 11/03/2023        Medications    Scheduled Medications cefepime, 2,000 mg, Intravenous, Q24H  chlorhexidine, 15 mL, Mouth/Throat, Q12H  metoclopramide, 10 mg, Intravenous, Q6H  midodrine, 10 mg, Per G Tube, Q8H  pantoprazole, 40 mg, Intravenous, Q12H  senna-docusate sodium, 2 tablet, Nasogastric, BID  sodium chloride, 10 mL, Intravenous, Q12H        Infusions dextrose, 25 mL/hr, Last Rate: 25 mL/hr (11/07/23 0943)  norepinephrine, 0.02-0.5 mcg/kg/min, Last Rate: 0.04 mcg/kg/min (11/10/23 0254)  Pharmacy to Dose Cefepime,         PRN Medications   acetaminophen **OR** acetaminophen    ALPRAZolam    artificial tears    bisacodyl    dextrose    dextrose    glucagon (human recombinant)    ipratropium-albuterol    nitroglycerin    ondansetron **OR** ondansetron    Pharmacy to Dose Cefepime    Phosphorus Replacement - Follow Nurse / BPA Driven Protocol    polyethylene glycol    Potassium Replacement - Follow Nurse / BPA Driven Protocol    sodium chloride    sodium chloride    sodium chloride     Physical Findings        Trending Physical   Appearance, NFPE 11/10: NFPE completed and not consistent with nutrition diagnosis of  malnutrition at this time using AND/ASPEN criteria     11/4: Unable to complete NFPE today - will follow up   --  Edema  1+ generalized   2+ left arm, left hand, legs  3+ right arm, right hand scrotum      Bowel Function FMS in place - 700 mL output last 24 hours      Tubes PEG     Chewing/Swallowing Unable - is on vent     Skin No pressure injuries documented        Estimated/Assessed Needs    Assessed 11/4/23   Energy Requirements    EST Needs, Method, Wt used 2129 kcal/day (PSU 2003b using Tmax 37.4 and L/min 14.4)       Protein Requirements    EST Needs, Method, Wt used 113-150 g/day (1.5-2 g/kg BW)        Fluid Requirements     Estimated Needs (mL/day) 500mL + total output -- R/t CONOR     Current Nutrition Orders & Evaluation of Intake       Oral Nutrition     Food Allergies NKFA   Current PO Diet NPO Diet NPO Type: Strict NPO   Supplement None    PO Evaluation     Trending % PO Intake 11/10: NPO  11/4: NPO since admission      Enteral Nutrition    Enteral Route PEG in place    Order, Modulars, Flushes Novasource Renal at 20 mL/hour + 10 mL/hour water flush    Residual/Tolerance Residuals greater than 500   TF Observation  On hold        Parenteral Nutrition     TPN Route    Total # Days on TPN    TPN Order, Lipid Details    MVI & Trace Element Freq    TPN Observation         Nutrition Course Details    PO Diets: 11/4: NPO since admission    Nutrition Support: TF 11/6 to 11/9 current      Nutritional Risk Screening        NRS-2002 Score          Nutrition Diagnosis         Nutrition Dx Problem 1 Inadequate oral intake R/t inability to take diet PO; as evidenced by trach/vent support.      Nutrition Dx Problem 2        Intervention Goal         Intervention Goal(s) Tolerate EN     Nutrition Intervention        RD Action Continue EN     Nutrition Prescription          Diet Prescription NPO   Supplement Prescription None ordered      Enteral Prescription Initial Goal:  *initial goal conservative d/t risk of RFS      Peptamen AF at 20mL/hr + 10mL/hr water flush      End Goal:    Peptamen AF at 80 mL/hr + water flush per clinical picture      Calories  2112 kcals (99%)   Protein  132 g (in range)   Free water  1426 mL   Flushes  Will monitor hydration status      The above end goal rate is for 22 hrs/day to assume interruptions for ADLs. Water flushes adjusted based on clinical picture + Rx flushes/IV fluids     Specialized formula chosen r/t high protein need in the critical care setting.             TPN Prescription      Monitor/Evaluation        Monitor I&O, Pertinent labs, EN delivery/tolerance, Weight, Skin status, GI status, POC/GOC, Hemodynamic stability     Electronically signed by:  Cassy Donovan RD  11/10/23 07:44 EST

## 2023-11-10 NOTE — PROGRESS NOTES
"                                                                                                                                      Nephrology  Progress Note                                        [facility]    Patient Identification    Name: Yusuf iJn  Age: 52 y.o.  Sex: male  :  1970  MRN: [id number]      DATE OF SERVICE:  11/10/2023        Subective    On the vent unresponsive  On Levophed       Objective   Scheduled Meds:cefepime, 2,000 mg, Intravenous, Q24H  chlorhexidine, 15 mL, Mouth/Throat, Q12H  metoclopramide, 10 mg, Intravenous, Q6H  midodrine, 10 mg, Per G Tube, Q8H  pantoprazole, 40 mg, Intravenous, Q12H  senna-docusate sodium, 2 tablet, Nasogastric, BID  sodium chloride, 10 mL, Intravenous, Q12H          Continuous Infusions:dextrose, 25 mL/hr, Last Rate: 25 mL/hr (23)  norepinephrine, 0.02-0.5 mcg/kg/min, Last Rate: 0.04 mcg/kg/min (11/10/23 025)  Pharmacy to Dose Cefepime,         PRN Meds:  acetaminophen **OR** acetaminophen    ALPRAZolam    artificial tears    bisacodyl    dextrose    dextrose    glucagon (human recombinant)    ipratropium-albuterol    nitroglycerin    ondansetron **OR** ondansetron    Pharmacy to Dose Cefepime    Phosphorus Replacement - Follow Nurse / BPA Driven Protocol    polyethylene glycol    Potassium Replacement - Follow Nurse / BPA Driven Protocol    sodium chloride    sodium chloride    sodium chloride     Exam:  /81   Pulse 62   Temp 99.3 °F (37.4 °C) (Axillary)   Resp 20   Ht 175.3 cm (69\")   Wt 88.4 kg (194 lb 14.2 oz)   SpO2 98%   BMI 28.78 kg/m²     Intake/Output last 3 shifts:  I/O last 3 completed shifts:  In: 2857 [I.V.:2408; Other:121; NG/GT:328]  Out: 275 [Stool:75]    Intake/Output this shift:  I/O this shift:  In: 455 [I.V.:335; Other:60; NG/GT:60]  Out: 700 [Stool:700]    Physical exam:  General Appearance: Trach vent presors    Head:  Normocephalic, without obvious abnormality, atraumatic  Eyes: " conjunctiva/corneas clear     Neck:  Supple,  no adenopathy;      Lungs: Tunneled dialysis catheter noted decreased BS occasion ronchi  Heart:  Regular rate and rhythm, S1 and S2 normal  Abdomen: PEG tube noted soft, non-tender, bowel sounds active   Extremities: trace edema  Pulses: 2+ and symmetric all extremities  Skin:  No rashes or lesions       Data Review:  All labs (24hrs):   Recent Results (from the past 24 hour(s))   Adult Transthoracic Echo Limited W/ Cont if Necessary Per Protocol    Collection Time: 11/09/23  9:07 AM   Result Value Ref Range    EF(MOD-bp) 49.9 %    EDV(MOD-sp2) 90.1 ml    EDV(MOD-sp4) 83.7 ml    ESV(MOD-sp2) 44.0 ml    ESV(MOD-sp4) 40.7 ml    SV(MOD-sp2) 46.1 ml    SV(MOD-sp4) 43.0 ml    EF(MOD-sp2) 51.2 %    EF(MOD-sp4) 51.4 %    Echo EF Estimated 45.0 %   Magnesium    Collection Time: 11/09/23  1:02 PM    Specimen: Blood   Result Value Ref Range    Magnesium 1.8 1.6 - 2.6 mg/dL   Calcium, Ionized    Collection Time: 11/09/23  1:02 PM    Specimen: Blood   Result Value Ref Range    Ionized Calcium 1.17 (L) 1.20 - 1.30 mmol/L   Basic Metabolic Panel    Collection Time: 11/09/23  1:02 PM    Specimen: Blood   Result Value Ref Range    Glucose 92 65 - 99 mg/dL    BUN 34 (H) 6 - 20 mg/dL    Creatinine 5.39 (H) 0.76 - 1.27 mg/dL    Sodium 138 136 - 145 mmol/L    Potassium 4.4 3.5 - 5.2 mmol/L    Chloride 106 98 - 107 mmol/L    CO2 23.0 22.0 - 29.0 mmol/L    Calcium 8.4 (L) 8.6 - 10.5 mg/dL    BUN/Creatinine Ratio 6.3 (L) 7.0 - 25.0    Anion Gap 9.0 5.0 - 15.0 mmol/L    eGFR 11.9 (L) >60.0 mL/min/1.73   Lactic Acid, Plasma    Collection Time: 11/09/23  1:02 PM    Specimen: Blood   Result Value Ref Range    Lactate 1.8 0.5 - 2.0 mmol/L   POC Glucose Once    Collection Time: 11/09/23  1:13 PM    Specimen: Blood   Result Value Ref Range    Glucose 83 70 - 105 mg/dL   POC Glucose Once    Collection Time: 11/09/23  6:25 PM    Specimen: Blood   Result Value Ref Range    Glucose 84 70 - 105 mg/dL    Hemoglobin & Hematocrit, Blood    Collection Time: 11/09/23  8:02 PM    Specimen: Blood   Result Value Ref Range    Hemoglobin 8.2 (L) 13.0 - 17.7 g/dL    Hematocrit 24.6 (L) 37.5 - 51.0 %   Type & Screen    Collection Time: 11/09/23  8:02 PM    Specimen: Blood   Result Value Ref Range    ABO Type O     RH type Positive     Antibody Screen Negative     T&S Expiration Date 11/12/2023 11:59:59 PM    POC Glucose Once    Collection Time: 11/10/23  1:50 AM    Specimen: Blood   Result Value Ref Range    Glucose 80 70 - 105 mg/dL   Comprehensive Metabolic Panel    Collection Time: 11/10/23  2:59 AM    Specimen: Blood   Result Value Ref Range    Glucose 91 65 - 99 mg/dL    BUN 40 (H) 6 - 20 mg/dL    Creatinine 6.17 (H) 0.76 - 1.27 mg/dL    Sodium 137 136 - 145 mmol/L    Potassium 4.2 3.5 - 5.2 mmol/L    Chloride 107 98 - 107 mmol/L    CO2 21.0 (L) 22.0 - 29.0 mmol/L    Calcium 8.5 (L) 8.6 - 10.5 mg/dL    Total Protein 4.8 (L) 6.0 - 8.5 g/dL    Albumin 1.9 (L) 3.5 - 5.2 g/dL    ALT (SGPT) 72 (H) 1 - 41 U/L    AST (SGOT) 62 (H) 1 - 40 U/L    Alkaline Phosphatase 100 39 - 117 U/L    Total Bilirubin 0.5 0.0 - 1.2 mg/dL    Globulin 2.9 gm/dL    A/G Ratio 0.7 g/dL    BUN/Creatinine Ratio 6.5 (L) 7.0 - 25.0    Anion Gap 9.0 5.0 - 15.0 mmol/L    eGFR 10.1 (L) >60.0 mL/min/1.73   Magnesium    Collection Time: 11/10/23  2:59 AM    Specimen: Blood   Result Value Ref Range    Magnesium 1.8 1.6 - 2.6 mg/dL   Phosphorus    Collection Time: 11/10/23  2:59 AM    Specimen: Blood   Result Value Ref Range    Phosphorus 1.7 (C) 2.5 - 4.5 mg/dL   Calcium, Ionized    Collection Time: 11/10/23  2:59 AM    Specimen: Blood   Result Value Ref Range    Ionized Calcium 1.18 (L) 1.20 - 1.30 mmol/L   CBC Auto Differential    Collection Time: 11/10/23  2:59 AM    Specimen: Blood   Result Value Ref Range    WBC 18.30 (H) 3.40 - 10.80 10*3/mm3    RBC 2.70 (L) 4.14 - 5.80 10*6/mm3    Hemoglobin 8.6 (L) 13.0 - 17.7 g/dL    Hematocrit 25.4 (L) 37.5 - 51.0 %     MCV 93.9 79.0 - 97.0 fL    MCH 32.0 26.6 - 33.0 pg    MCHC 34.1 31.5 - 35.7 g/dL    RDW 13.4 12.3 - 15.4 %    RDW-SD 46.4 37.0 - 54.0 fl    MPV 9.1 6.0 - 12.0 fL    Platelets 98 (L) 140 - 450 10*3/mm3   Scan Slide    Collection Time: 11/10/23  2:59 AM    Specimen: Blood   Result Value Ref Range    Scan Slide     Manual Differential    Collection Time: 11/10/23  2:59 AM    Specimen: Blood   Result Value Ref Range    Neutrophil % 75.0 42.7 - 76.0 %    Lymphocyte % 8.0 (L) 19.6 - 45.3 %    Monocyte % 3.0 (L) 5.0 - 12.0 %    Eosinophil % 3.0 0.3 - 6.2 %    Bands %  6.0 (H) 0.0 - 5.0 %    Metamyelocyte % 2.0 (H) 0.0 - 0.0 %    Myelocyte % 1.0 (H) 0.0 - 0.0 %    Promyelocyte % 1.0 (H) 0.0 - 0.0 %    Atypical Lymphocyte % 1.0 0.0 - 5.0 %    Neutrophils Absolute 14.82 (H) 1.70 - 7.00 10*3/mm3    Lymphocytes Absolute 1.65 0.70 - 3.10 10*3/mm3    Monocytes Absolute 0.55 0.10 - 0.90 10*3/mm3    Eosinophils Absolute 0.55 (H) 0.00 - 0.40 10*3/mm3    RBC Morphology Normal Normal    Dohle Bodies Present None Seen    Toxic Granulation Slight/1+ None Seen    Giant Platelets Slight/1+ None Seen          Imaging:  XR Abdomen KUB    Result Date: 11/9/2023  Impression: Nonobstructive bowel gas pattern. Mild stool burden present. Gastrostomy tube within the left hemiabdomen. Electronically Signed: Caitlyn Urbina MD  11/9/2023 2:00 AM EST  Workstation ID: [id number]    XR Abdomen KUB    Result Date: 11/8/2023  Impression: 1. Moderate gas distention of mid abdominal bowel, thought to represent the colon. Findings may reflect changes of mild ileus. 2. No convincing evidence of high-grade bowel obstruction on this examination. 3. Percutaneous gastrostomy tube projects of the left upper quadrant of the abdomen, newly placed since the prior study. No gross free intraperitoneal air is evident. Electronically Signed: Yaritza Chaparro MD  11/8/2023 8:44 AM EST  Workstation ID: [id number]    IR Insert Tunneled CV Catheter Without Port 5 Plus    Result  Date: 11/7/2023  1. Successful placement of left IJ PermCath. Electronically Signed: Agus Perez MD  11/7/2023 11:16 AM EST  Workstation ID: [id number]    US Liver    Result Date: 11/6/2023  Impression: No acute sonographic abnormality of the liver. Electronically Signed: Andrea Galarza MD  11/6/2023 7:43 AM EST  Workstation ID: [id number]    XR Chest 1 View    Result Date: 11/6/2023  Impression: 1. Stable lines and support tubes. 2. Negative for pneumothorax. 3. Persistent central pulmonary vascular congestion and findings suggesting mild pulmonary edema, not significantly changed from the prior study. Electronically Signed: Andrea Galarza MD  11/6/2023 7:07 AM EST  Workstation ID: [id number]    CT Abdomen Pelvis Without Contrast    Result Date: 11/5/2023  Impression: 1. Nonspecific fluid and mixed liquid stool throughout the abdomen without signs of overt obstruction or active inflammation. Correlate for diarrheal illness. 2. Increasing patchy airspace opacities in the lower lobes suspicious for pneumonia versus aspiration. 3. Retained contrast in the right kidney suggesting acute tubular necrosis. No hydronephrosis. 4. Findings of mild volume overload/third spacing including body wall edema, presacral edema and trace ascites. No drainable collection. 5. Other ancillary findings as above. Electronically Signed: Jermaine Heath MD  11/5/2023 11:15 AM EST  Workstation ID: [id number]    MRI Brain Without Contrast    Result Date: 11/5/2023  Impression: MRI findings suggesting diffuse anoxic brain injury. Electronically Signed: Jermaine Heath MD  11/5/2023 10:57 AM EST  Workstation ID: [id number]    XR Chest 1 View    Result Date: 11/5/2023  Impression: 1. Improving vascular congestion and pulmonary edema. 2. Support devices as above, including high ET tube 8.7 cm above andry similar in position to prior exam. Electronically Signed: Jermaine Heath MD  11/5/2023 8:33 AM EST  Workstation ID: [id number]    XR Chest 1 View    Result  Date: 11/4/2023  Impression: Appropriately placed right internal jugular CVC catheter with the tip in the mid SVC. No pneumothorax. Mild pulmonary edema pattern, improved as compared to the previous study. Electronically Signed: Caitlyn Urbina MD  11/4/2023 12:54 AM EDT  Workstation ID: [id number]    XR Chest 1 View    Result Date: 11/3/2023  Impression: Unchanged position of right PICC. Right subclavian dual-lumen venous catheter with the distal tip at the upper to mid SVC. Adequate position of endotracheal and enteric tube. Mildly improved patchy opacities throughout the lungs which may represent true improvement versus technical differences Electronically Signed: Ellis Gibson DO  11/3/2023 5:07 PM EDT  Workstation ID: [id number]    XR Abdomen KUB    Result Date: 11/3/2023  Impression: Feeding tube terminates in the stomach. Electronically Signed: Grey Newton MD  11/3/2023 3:21 AM EDT  Workstation ID: [id number]    XR Chest 1 View    Result Date: 11/3/2023  Impression: 1.Worsening diffuse bilateral airspace disease. 2.Endotracheal tube tip 4.6 cm above the andry. 3.Right upper extremity PICC terminates in the right brachiocephalic vein. Electronically Signed: Grey Newton MD  11/3/2023 3:21 AM EDT  Workstation ID: [id number]    CT Angiogram Chest Pulmonary Embolism    Result Date: 11/3/2023  Impression: 1.Multifocal pneumonia. 2.No evidence of pulmonary embolism. Electronically Signed: Grey Newton MD  11/3/2023 12:22 AM EDT  Workstation ID: [id number]    CT Head Without Contrast    Result Date: 11/3/2023  Impression: 1.Gray-white differentiation is present, but poor throughout the brain and cerebellum. This could be related to anoxic brain injury. This could be further evaluated with follow-up brain MRI or serial CT as indicated. 2.No acute intracranial hemorrhage. Electronically Signed: Grey Newton MD  11/3/2023 12:20 AM EDT  Workstation ID: [id number]    XR Chest 1 View    Result Date: 11/2/2023  Impression:  1.ET tube tip 5.3 cm above the andry. 2.Right apical airspace disease. Electronically Signed: Grey Newton MD  11/2/2023 11:37 PM EDT  Workstation ID: [id number]     Assessment/Plan:     Cardiac arrest with successful resuscitation    Marijuana abuse    Alcohol abuse    Elevated blood sugar       Acute kidney injury   Severe metabolic/ respiratory acidosis   Cardiopulmonary arrest   ARDS   Possible anoxic brain injury   Hyperglycemia   Elevated troponin   Hypokalemia   Hypernatremia   Lactic acidosis  Recommendations:  Dialysis today    Still on Levophed  Unable to tolerate p.o. with high residuals  Plan is to increase midodrine once he is able to tolerate p.o.

## 2023-11-10 NOTE — PLAN OF CARE
Goal Outcome Evaluation:      Pt remains on D10 and levophed with BP very labile, SBP ranging from  within minutes. No response to stimuli, involuntary movements involving shoulders and arms. Anuric, FMS intact, stool documented. Gastric residuals decreased from previous shifts.

## 2023-11-10 NOTE — PROGRESS NOTES
"     LOS: 7 days     Chief Complaint: Found down/cardiopulmonary arrest       SUBJECTIVE:    History taken from: chart family RN my discussion with the ER team    Interval History: Yusuf Jin was admitted on 11/2/2023 whom I have followed for several days suspecting anoxic brain injury    Family wanted to go full supportive care and that is how he ended up with trach and PEG    He was initially having some breathing and some nonspecific movements but [place] Coma Scale never went high    His MRI already showed significant damage but the patient already had trach and PEG    I got a call from the ICU team that he seems to be worse and now he has no eye signs and he has no cough or gag or any respiratory effort    So we revisited the patient's case and he clinically looks like he is brain-dead except for some spinal reflex head movement    We did cold calorics they were unremarkable    No sedation  Vital signs stable, temperature 96.6-99.3    Blood pressure stable    His BUN and creatinine are elevated and he is anemic but none of these labs would put him in a coma like state that he is now so ended up with doing brain perfusion scan, after ICU team got permission from the patient's daughter because this test if positive have implications.  Nonetheless the patient's daughter gave permission to get brain perfusion scan which was done at 11:49 AM Eastern standard time 11/10/2023, and reported at 1:51 PM patient signed the time on 11/10/2023 demonstrating,  \"No intracranial radiopharmaceutical uptake is seen, corresponding to a clinical suspicion of brain death.  \"    I talked to Dr. Mendieta and he is going to inform the family    The issue right now is that once brain death has been established, all further decisions will have to be put on hold except for organ donation or making arrangements as the patient is clinically and lab confirmed and exam confirmed brain-dead    This was a catastrophic event and we have " given him enough time to get all the toxins out of his system    His temperature is okay and his vital signs are okay and no sedatives on board and no cough or gag or respiratory effort    He has some spinal reflex type activity but nothing else          - Portions of the above HPI were copied from previous encounters and edited as appropriate.    Patient Complaints: Not possible      Review of Systems   Not possible    Pertinent PMH:  has no past medical history on file.   ________________________________________________     OBJECTIVE:  Intubated but not sedated    Neurologic Exam    Comatose exam  PHYSICAL EXAM:    Constitutional: The patient is in no apparent distress, intubated on ventilator without sedation. Comatose.     Head: Normocephalic, atraumatic.     Chest: Intubated on ventilator    Cardiac: Regular rate and rhythm.     Extremities:  No clubbing, cyanosis. Mild edema all extremities    NEUROLOGICAL:    Cognition:   Comatose, no sedation  No response    Cranial nerves;  0/3 eye signs, pupils fixed at 4mm  no apparent facial asymmetry     Sensory: Nonspecific spinal reflex type movement    Motor: Some spinal reflex type movement, very nonspecific and does not meet the criteria for any kind of posturing otherwise either    Cerebellar and gait not able to be tested as pt is comatose.    No brainstem reflexes otherwise and cold calorics were negative  ________________________________________________   RESULTS REVIEW    VITAL SIGNS:  Temp:  [96.6 °F (35.9 °C)-99.3 °F (37.4 °C)] 96.6 °F (35.9 °C)  Heart Rate:  [60-74] 66  Resp:  [20-32] 32  BP: ()/() 134/75  FiO2 (%):  [40 %] 40 %    LABS:       Lab 11/10/23  0259 11/09/23 2002 11/09/23  1302 11/09/23  0614 11/08/23  0443 11/07/23  0452 11/06/23  0902 11/06/23  0427 11/05/23  2342 11/05/23  1609 11/04/23  0831 11/04/23  0335   WBC 18.30*  --   --  20.20* 14.80* 11.70*  --  8.40  --  8.10   < > 6.40   HEMOGLOBIN 8.6* 8.2*  --  8.7* 8.3* 9.3*   < >  9.3*   < > 9.6*   < > 14.6   HEMATOCRIT 25.4* 24.6*  --  26.0* 24.6* 27.9*   < > 27.1*   < > 28.3*   < > 42.7   PLATELETS 98*  --   --  90* 75* 59*  --  53*  --  54*   < > 133*   NEUTROS ABS 14.82*  --   --  16.97* 12.14* 9.36*  --  7.39*  --  7.29*   < > 5.06   EOS ABS 0.55*  --   --   --  0.30 0.47*  --   --   --   --   --  0.06   MCV 93.9  --   --  95.4 94.3 96.6  --  94.4  --  94.7   < > 95.4   PROCALCITONIN  --   --   --  86.17*  --   --   --   --   --   --   --   --    LACTATE  --   --  1.8  --   --   --   --   --   --   --   --   --    PROTIME  --   --   --   --   --   --   --   --   --  13.7*  --   --    APTT  --   --   --   --   --   --   --   --   --  39.3*  --   --     < > = values in this interval not displayed.         Lab 11/10/23  0259 11/09/23  1302 11/09/23  0614 11/08/23  2030 11/08/23  0443 11/07/23  1555 11/07/23  0452 11/06/23  0427 11/05/23 2001 11/05/23  1202   SODIUM 137 138 138  --  140  --  136 137   < > 139   POTASSIUM 4.2 4.4 4.3 3.3* 2.8*  --  3.8 3.4*   < > 4.5   CHLORIDE 107 106 107  --  107  --  104 101   < > 104   CO2 21.0* 23.0 23.0  --  21.0*  --  22.0 27.0   < > 25.0   ANION GAP 9.0 9.0 8.0  --  12.0  --  10.0 9.0   < > 10.0   BUN 40* 34* 31*  --  50*  --  37* 34*   < > 18   CREATININE 6.17* 5.39* 4.90*  --  6.19*  --  4.41* 3.68*   < > 2.28*   EGFR 10.1* 11.9* 13.4*  --  10.1*  --  15.2* 18.8*   < > 33.5*   GLUCOSE 91 92 97  --  111*  --  120* 110*   < > 88   CALCIUM 8.5* 8.4* 8.2*  --  7.7*  --  7.7* 7.9*   < > 9.0   IONIZED CALCIUM 1.18* 1.17* 1.15*  --   --   --   --  1.12*  --  1.18*   MAGNESIUM 1.8 1.8 1.8  --  2.0 1.8 1.9 1.7  --  1.6   PHOSPHORUS 1.7*  --  0.7*  --  0.9* 0.8* 1.7* 1.9*  --  4.1    < > = values in this interval not displayed.         Lab 11/10/23  0259 11/09/23  0614 11/08/23  0443 11/07/23  0452 11/06/23  0427   TOTAL PROTEIN 4.8* 4.8* 4.7* 5.0* 4.9*   ALBUMIN 1.9* 2.3* 2.4* 2.6* 2.5*   GLOBULIN 2.9 2.5 2.3 2.4 2.4   ALT (SGPT) 72* 89* 115* 198* 290*    AST (SGOT) 62* 85* 106* 223* 380*   BILIRUBIN 0.5 0.6 0.6 0.7 0.7   ALK PHOS 100 116 108 154* 68         Lab 11/06/23  1318 11/05/23  1609   PROBNP 2,100.0*  --    PROTIME  --  13.7*   INR  --  1.28*             Lab 11/09/23 2002   ABO TYPING O   RH TYPING Positive   ANTIBODY SCREEN Negative         Lab 11/05/23  0518 11/04/23  0417 11/03/23  1718   PH, ARTERIAL 7.427 7.377 7.279*   PCO2, ARTERIAL 37.1 35.0 42.7   PO2 .9* 226.6* 376.7*   O2 SATURATION ART 99.7* 99.8* 99.9*   FIO2 45 70 100   HCO3 ART 24.5 20.6* 20.0*   BASE EXCESS ART 0.3 -3.9* -6.6*     UA          11/2/2023    23:33   Urinalysis   Squamous Epithelial Cells, UA 0-2    Specific Gravity, UA <=1.005    Ketones, UA Negative    Blood, UA Trace    Leukocytes, UA Negative    Nitrite, UA Negative    RBC, UA None Seen    WBC, UA 0-2    Bacteria, UA None Seen        Lab Results   Component Value Date    TSH 1.920 11/03/2023     (H) 11/03/2023    HGBA1C 5.10 11/03/2023         IMAGING STUDIES:  NM Brain Scan Complete With Vascular Flow    Result Date: 11/10/2023  Impression: No intracranial radiopharmaceutical uptake is seen, corresponding to a clinical suspicion of brain death. I spoke with the patient's nurse, Melba, regarding the findings of this examination on 11/10/2023 at 1:50 p.m. Electronically Signed: Yaritza Chaparro MD  11/10/2023 1:51 PM EST  Workstation ID: [id number]    XR Chest 1 View    Result Date: 11/10/2023  Impression: 1. Placement of tracheostomy tube with tip above the andry. 2. NG tube removed. 3. Stable right upper extremity PICC and left sided central venous catheter. 4. Stable findings suggesting pulmonary edema. Electronically Signed: Andrea Galarza MD  11/10/2023 7:15 AM EST  Workstation ID: [id number]    XR Abdomen KUB    Result Date: 11/9/2023  Impression: Nonobstructive bowel gas pattern. Mild stool burden present. Gastrostomy tube within the left hemiabdomen. Electronically Signed: Caitlyn Urbina MD  11/9/2023 2:00 AM  EST  Workstation ID: [id number]     I reviewed the patient's new clinical results.    ________________________________________________      PROBLEM LIST:    Cardiac arrest with successful resuscitation    Marijuana abuse    Alcohol abuse    Elevated blood sugar        ASSESSMENT/PLAN:  This is a very sad situation for this 52-year-old gentleman who ended up with cardiac arrest.  He was resuscitated but he has gone downhill.  As I talked to the family when I met them on the first 2 days that there are 3 possible outcomes.    The first 1 is that the patient may be normal, which unfortunately did not happen    The last 1 is that the patient's may proceed to brain death very quickly and he was not brain dead within the first few days.    We were suspecting persistent vegetative state but unfortunately, not surprisingly, considering the degree of damage and what we see on brain MRI, he has progressed to brain death    There are no reversible causes identified    His clinical examination is compatible with brain death    Family to now make arrangements    **Please refer to previous notes for further details and recommendations.     I discussed the patient's findings and my recommendations with nursing staff and ICU team    Chacho Sher MD  11/10/23  13:57 EST

## 2023-11-11 LAB
CATHETER CULTURE: NORMAL
GLUCOSE BLDC GLUCOMTR-MCNC: 78 MG/DL (ref 70–105)
GLUCOSE BLDC GLUCOMTR-MCNC: 79 MG/DL (ref 70–105)

## 2023-11-11 PROCEDURE — 94761 N-INVAS EAR/PLS OXIMETRY MLT: CPT

## 2023-11-11 PROCEDURE — 82948 REAGENT STRIP/BLOOD GLUCOSE: CPT

## 2023-11-11 PROCEDURE — 94799 UNLISTED PULMONARY SVC/PX: CPT

## 2023-11-11 PROCEDURE — 94003 VENT MGMT INPAT SUBQ DAY: CPT

## 2023-11-11 RX ADMIN — Medication 0.06 MCG/KG/MIN: at 07:12

## 2023-11-11 NOTE — NURSING NOTE
Near 2100, pt's daughter Tamiko and visitors at bedside requesting to speak with provider regarding current state of pt. EDOUARD Rollins at bedside with RN. [employer] representative spoke with family, Tamiko states she will return in the morning to meet again with [employer] and make final decisions, but is unable to tonight. APRN aware. Nursing care, levophed gtt, ventilator unchanged.

## 2023-11-11 NOTE — PROGRESS NOTES
"                                                                                                                                      Nephrology  Progress Note                                        [facility]    Patient Identification    Name: Yusuf Jin  Age: 52 y.o.  Sex: male  :  1970  MRN: [id number]      DATE OF SERVICE:  2023        Subective    Noted events     Objective   Scheduled Meds:         Continuous Infusions:norepinephrine, 0.02-0.5 mcg/kg/min, Last Rate: 0.06 mcg/kg/min (23 0712)        PRN Meds:     Exam:  /73   Pulse 54   Temp 96.4 °F (35.8 °C) (Infrared)   Resp (!) 30   Ht 175.3 cm (69\")   Wt 88.4 kg (194 lb 14.2 oz)   SpO2 100%   BMI 28.78 kg/m²     Intake/Output last 3 shifts:  I/O last 3 completed shifts:  In: 999 [I.V.:839; Other:80; NG/GT:80]  Out: 700 [Stool:700]    Intake/Output this shift:  No intake/output data recorded.    Physical exam:  General Appearance: Trach vent presors    Head:  Normocephalic, without obvious abnormality, atraumatic  Eyes: conjunctiva/corneas clear     Neck:  Supple,  no adenopathy;      Lungs: Tunneled dialysis catheter noted decreased BS occasion ronchi  Heart:  Regular rate and rhythm, S1 and S2 normal  Abdomen: PEG tube noted soft, non-tender, bowel sounds active   Extremities: trace edema  Pulses: 2+ and symmetric all extremities  Skin:  No rashes or lesions       Data Review:  All labs (24hrs):   Recent Results (from the past 24 hour(s))   POC Glucose Once    Collection Time: 11/10/23 11:43 AM    Specimen: Blood   Result Value Ref Range    Glucose 78 70 - 105 mg/dL          Imaging:  NM Brain Scan Complete With Vascular Flow    Result Date: 11/10/2023  Impression: No intracranial radiopharmaceutical uptake is seen, corresponding to a clinical suspicion of brain death. I spoke with the patient's nurse, Melba, regarding the findings of this examination on 11/10/2023 at 1:50 p.m. Electronically " Signed: Yaritza Chaparro MD  11/10/2023 1:51 PM EST  Workstation ID: [id number]    XR Chest 1 View    Result Date: 11/10/2023  Impression: 1. Placement of tracheostomy tube with tip above the andry. 2. NG tube removed. 3. Stable right upper extremity PICC and left sided central venous catheter. 4. Stable findings suggesting pulmonary edema. Electronically Signed: Andrea Galarza MD  11/10/2023 7:15 AM EST  Workstation ID: [id number]    XR Abdomen KUB    Result Date: 11/9/2023  Impression: Nonobstructive bowel gas pattern. Mild stool burden present. Gastrostomy tube within the left hemiabdomen. Electronically Signed: Caitlyn Urbina MD  11/9/2023 2:00 AM EST  Workstation ID: [id number]    XR Abdomen KUB    Result Date: 11/8/2023  Impression: 1. Moderate gas distention of mid abdominal bowel, thought to represent the colon. Findings may reflect changes of mild ileus. 2. No convincing evidence of high-grade bowel obstruction on this examination. 3. Percutaneous gastrostomy tube projects of the left upper quadrant of the abdomen, newly placed since the prior study. No gross free intraperitoneal air is evident. Electronically Signed: Yaritza Chaparro MD  11/8/2023 8:44 AM EST  Workstation ID: [id number]    IR Insert Tunneled CV Catheter Without Port 5 Plus    Result Date: 11/7/2023  1. Successful placement of left IJ PermCath. Electronically Signed: Agus Perez MD  11/7/2023 11:16 AM EST  Workstation ID: [id number]    US Liver    Result Date: 11/6/2023  Impression: No acute sonographic abnormality of the liver. Electronically Signed: Andrea Galarza MD  11/6/2023 7:43 AM EST  Workstation ID: [id number]    XR Chest 1 View    Result Date: 11/6/2023  Impression: 1. Stable lines and support tubes. 2. Negative for pneumothorax. 3. Persistent central pulmonary vascular congestion and findings suggesting mild pulmonary edema, not significantly changed from the prior study. Electronically Signed: Andrea Galarza MD  11/6/2023 7:07 AM EST  Workstation  ID: [id number]    CT Abdomen Pelvis Without Contrast    Result Date: 11/5/2023  Impression: 1. Nonspecific fluid and mixed liquid stool throughout the abdomen without signs of overt obstruction or active inflammation. Correlate for diarrheal illness. 2. Increasing patchy airspace opacities in the lower lobes suspicious for pneumonia versus aspiration. 3. Retained contrast in the right kidney suggesting acute tubular necrosis. No hydronephrosis. 4. Findings of mild volume overload/third spacing including body wall edema, presacral edema and trace ascites. No drainable collection. 5. Other ancillary findings as above. Electronically Signed: Jermaine Heath MD  11/5/2023 11:15 AM EST  Workstation ID: [id number]    MRI Brain Without Contrast    Result Date: 11/5/2023  Impression: MRI findings suggesting diffuse anoxic brain injury. Electronically Signed: Jermaine Heath MD  11/5/2023 10:57 AM EST  Workstation ID: [id number]    XR Chest 1 View    Result Date: 11/5/2023  Impression: 1. Improving vascular congestion and pulmonary edema. 2. Support devices as above, including high ET tube 8.7 cm above andry similar in position to prior exam. Electronically Signed: Jermaine Heath MD  11/5/2023 8:33 AM EST  Workstation ID: [id number]    XR Chest 1 View    Result Date: 11/4/2023  Impression: Appropriately placed right internal jugular CVC catheter with the tip in the mid SVC. No pneumothorax. Mild pulmonary edema pattern, improved as compared to the previous study. Electronically Signed: Caitlyn Urbina MD  11/4/2023 12:54 AM EDT  Workstation ID: [id number]    XR Chest 1 View    Result Date: 11/3/2023  Impression: Unchanged position of right PICC. Right subclavian dual-lumen venous catheter with the distal tip at the upper to mid SVC. Adequate position of endotracheal and enteric tube. Mildly improved patchy opacities throughout the lungs which may represent true improvement versus technical differences Electronically Signed: Ellis  DO Paige  11/3/2023 5:07 PM EDT  Workstation ID: [id number]     Assessment/Plan:     Cardiac arrest with successful resuscitation    Marijuana abuse    Alcohol abuse    Elevated blood sugar       Acute kidney injury   Severe metabolic/ respiratory acidosis   Cardiopulmonary arrest   ARDS   Possible anoxic brain injury   Hyperglycemia   Elevated troponin   Hypokalemia   Hypernatremia   Lactic acidosis  Recommendations:  Brain dead  No more dialysis

## 2023-11-11 NOTE — NURSING NOTE
Patient was determined to be brain dead at 1350.  I was present with Doctor Anam for the phone call to patients daughter.  Initially daughter was ok with the patients children and his mother visiting.  Later his daughter called and informed me that she did not want his mother notified.  [facility] has been made aware of the situation.  I also contacted the coroners office and a  stated that they will be taking his case to investigate.

## 2023-11-12 ENCOUNTER — APPOINTMENT (OUTPATIENT)
Dept: GENERAL RADIOLOGY | Facility: HOSPITAL | Age: 53
DRG: 917 | End: 2023-11-12
Payer: MEDICAID

## 2023-11-12 ENCOUNTER — APPOINTMENT (OUTPATIENT)
Dept: CARDIOLOGY | Facility: HOSPITAL | Age: 53
DRG: 917 | End: 2023-11-12
Payer: MEDICAID

## 2023-11-12 ENCOUNTER — APPOINTMENT (OUTPATIENT)
Dept: CT IMAGING | Facility: HOSPITAL | Age: 53
DRG: 917 | End: 2023-11-12
Payer: MEDICAID

## 2023-11-12 LAB
ABO GROUP BLD: NORMAL
ALBUMIN SERPL-MCNC: 2 G/DL (ref 3.5–5.2)
ALBUMIN SERPL-MCNC: 2 G/DL (ref 3.5–5.2)
ALBUMIN SERPL-MCNC: 2.2 G/DL (ref 3.5–5.2)
ALBUMIN SERPL-MCNC: 2.5 G/DL (ref 3.5–5.2)
ALBUMIN/GLOB SERPL: 0.7 G/DL
ALP SERPL-CCNC: 131 U/L (ref 39–117)
ALP SERPL-CCNC: 134 U/L (ref 39–117)
ALP SERPL-CCNC: 148 U/L (ref 39–117)
ALP SERPL-CCNC: 179 U/L (ref 39–117)
ALT SERPL W P-5'-P-CCNC: 48 U/L (ref 1–41)
ALT SERPL W P-5'-P-CCNC: 56 U/L (ref 1–41)
ALT SERPL W P-5'-P-CCNC: 57 U/L (ref 1–41)
ALT SERPL W P-5'-P-CCNC: 60 U/L (ref 1–41)
AMYLASE SERPL-CCNC: 100 U/L (ref 28–100)
AMYLASE SERPL-CCNC: 124 U/L (ref 28–100)
AMYLASE SERPL-CCNC: 127 U/L (ref 28–100)
AMYLASE SERPL-CCNC: 127 U/L (ref 28–100)
ANION GAP SERPL CALCULATED.3IONS-SCNC: 10 MMOL/L (ref 5–15)
ANION GAP SERPL CALCULATED.3IONS-SCNC: 15 MMOL/L (ref 5–15)
APTT PPP: 34.2 SECONDS (ref 24–31)
APTT PPP: 35 SECONDS (ref 24–31)
APTT PPP: 35.1 SECONDS (ref 24–31)
APTT PPP: 36.7 SECONDS (ref 24–31)
APTT PPP: 47.9 SECONDS (ref 24–31)
ARTERIAL PATENCY WRIST A: ABNORMAL
ARTERIAL PATENCY WRIST A: POSITIVE
AST SERPL-CCNC: 42 U/L (ref 1–40)
AST SERPL-CCNC: 55 U/L (ref 1–40)
AST SERPL-CCNC: 55 U/L (ref 1–40)
AST SERPL-CCNC: 57 U/L (ref 1–40)
ATMOSPHERIC PRESS: ABNORMAL MM[HG]
BASE EXCESS BLDA CALC-SCNC: -2.6 MMOL/L (ref 0–3)
BASE EXCESS BLDA CALC-SCNC: -4 MMOL/L (ref 0–3)
BASE EXCESS BLDA CALC-SCNC: -4.6 MMOL/L (ref 0–3)
BASE EXCESS BLDA CALC-SCNC: -5.3 MMOL/L (ref 0–3)
BASE EXCESS BLDA CALC-SCNC: -5.3 MMOL/L (ref 0–3)
BASE EXCESS BLDA CALC-SCNC: -6.4 MMOL/L (ref 0–3)
BASE EXCESS BLDA CALC-SCNC: -6.8 MMOL/L (ref 0–3)
BASE EXCESS BLDA CALC-SCNC: -7.2 MMOL/L (ref 0–3)
BDY SITE: ABNORMAL
BH CV ECHO MEAS - AO MAX PG: 5.1 MMHG
BH CV ECHO MEAS - AO MEAN PG: 3 MMHG
BH CV ECHO MEAS - AO V2 MAX: 113 CM/SEC
BH CV ECHO MEAS - AO V2 VTI: 19.7 CM
BH CV ECHO MEAS - AVA(I,D): 3.3 CM2
BH CV ECHO MEAS - EDV(CUBED): 125 ML
BH CV ECHO MEAS - EDV(MOD-SP4): 133 ML
BH CV ECHO MEAS - EF(MOD-SP4): 59.5 %
BH CV ECHO MEAS - ESV(CUBED): 42.9 ML
BH CV ECHO MEAS - ESV(MOD-SP4): 53.8 ML
BH CV ECHO MEAS - FS: 30 %
BH CV ECHO MEAS - IVS/LVPW: 1.11 CM
BH CV ECHO MEAS - IVSD: 1 CM
BH CV ECHO MEAS - LA DIMENSION: 3.4 CM
BH CV ECHO MEAS - LAT PEAK E' VEL: 16.6 CM/SEC
BH CV ECHO MEAS - LV DIASTOLIC VOL/BSA (35-75): 64.1 CM2
BH CV ECHO MEAS - LV MASS(C)D: 169.9 GRAMS
BH CV ECHO MEAS - LV MAX PG: 3.9 MMHG
BH CV ECHO MEAS - LV MEAN PG: 2 MMHG
BH CV ECHO MEAS - LV SYSTOLIC VOL/BSA (12-30): 25.9 CM2
BH CV ECHO MEAS - LV V1 MAX: 99 CM/SEC
BH CV ECHO MEAS - LV V1 VTI: 17.1 CM
BH CV ECHO MEAS - LVIDD: 5 CM
BH CV ECHO MEAS - LVIDS: 3.5 CM
BH CV ECHO MEAS - LVOT AREA: 3.8 CM2
BH CV ECHO MEAS - LVOT DIAM: 2.2 CM
BH CV ECHO MEAS - LVPWD: 0.9 CM
BH CV ECHO MEAS - MED PEAK E' VEL: 9.8 CM/SEC
BH CV ECHO MEAS - MV A MAX VEL: 80.7 CM/SEC
BH CV ECHO MEAS - MV DEC SLOPE: 703.5 CM/SEC2
BH CV ECHO MEAS - MV DEC TIME: 0.13 SEC
BH CV ECHO MEAS - MV E MAX VEL: 91.2 CM/SEC
BH CV ECHO MEAS - MV E/A: 1.13
BH CV ECHO MEAS - MV MAX PG: 4.6 MMHG
BH CV ECHO MEAS - MV MEAN PG: 2 MMHG
BH CV ECHO MEAS - MV P1/2T: 44.5 MSEC
BH CV ECHO MEAS - MV V2 VTI: 20.2 CM
BH CV ECHO MEAS - MVA(P1/2T): 4.9 CM2
BH CV ECHO MEAS - MVA(VTI): 3.2 CM2
BH CV ECHO MEAS - PA V2 MAX: 92.7 CM/SEC
BH CV ECHO MEAS - RV MAX PG: 0.95 MMHG
BH CV ECHO MEAS - RV V1 MAX: 48.7 CM/SEC
BH CV ECHO MEAS - RV V1 VTI: 8.1 CM
BH CV ECHO MEAS - RVDD: 3.5 CM
BH CV ECHO MEAS - SI(MOD-SP4): 38.2 ML/M2
BH CV ECHO MEAS - SV(LVOT): 65 ML
BH CV ECHO MEAS - SV(MOD-SP4): 79.2 ML
BH CV ECHO MEAS - TAPSE (>1.6): 3.1 CM
BH CV ECHO MEAS - TR MAX PG: 19.7 MMHG
BH CV ECHO MEAS - TR MAX VEL: 222 CM/SEC
BH CV ECHO MEASUREMENTS AVERAGE E/E' RATIO: 6.91
BH CV XLRA - RV BASE: 4.4 CM
BH CV XLRA - RV LENGTH: 7.5 CM
BH CV XLRA - RV MID: 3.1 CM
BH CV XLRA - TDI S': 18.1 CM/SEC
BILIRUB CONJ SERPL-MCNC: 0.2 MG/DL (ref 0–0.3)
BILIRUB CONJ SERPL-MCNC: <0.2 MG/DL (ref 0–0.3)
BILIRUB SERPL-MCNC: 0.2 MG/DL (ref 0–1.2)
BILIRUB SERPL-MCNC: 0.3 MG/DL (ref 0–1.2)
BILIRUB SERPL-MCNC: 0.3 MG/DL (ref 0–1.2)
BILIRUB SERPL-MCNC: 0.4 MG/DL (ref 0–1.2)
BLD GP AB SCN SERPL QL: NEGATIVE
BUN SERPL-MCNC: 49 MG/DL (ref 6–20)
BUN SERPL-MCNC: 50 MG/DL (ref 6–20)
BUN SERPL-MCNC: 53 MG/DL (ref 6–20)
BUN SERPL-MCNC: 55 MG/DL (ref 6–20)
BUN/CREAT SERPL: 6.5 (ref 7–25)
BUN/CREAT SERPL: 6.9 (ref 7–25)
BUN/CREAT SERPL: 7.1 (ref 7–25)
BUN/CREAT SERPL: 7.2 (ref 7–25)
CA-I SERPL ISE-MCNC: 1.16 MMOL/L (ref 1.2–1.3)
CA-I SERPL ISE-MCNC: 1.19 MMOL/L (ref 1.2–1.3)
CA-I SERPL ISE-MCNC: 1.22 MMOL/L (ref 1.2–1.3)
CA-I SERPL ISE-MCNC: 1.23 MMOL/L (ref 1.2–1.3)
CALCIUM SPEC-SCNC: 8.5 MG/DL (ref 8.6–10.5)
CALCIUM SPEC-SCNC: 8.7 MG/DL (ref 8.6–10.5)
CALCIUM SPEC-SCNC: 8.8 MG/DL (ref 8.6–10.5)
CALCIUM SPEC-SCNC: 8.9 MG/DL (ref 8.6–10.5)
CHLORIDE SERPL-SCNC: 105 MMOL/L (ref 98–107)
CHLORIDE SERPL-SCNC: 106 MMOL/L (ref 98–107)
CHLORIDE SERPL-SCNC: 107 MMOL/L (ref 98–107)
CHLORIDE SERPL-SCNC: 107 MMOL/L (ref 98–107)
CK MB SERPL-CCNC: 5.13 NG/ML
CO2 BLDA-SCNC: 19.3 MMOL/L (ref 22–29)
CO2 BLDA-SCNC: 20.3 MMOL/L (ref 22–29)
CO2 BLDA-SCNC: 20.3 MMOL/L (ref 22–29)
CO2 BLDA-SCNC: 20.8 MMOL/L (ref 22–29)
CO2 BLDA-SCNC: 21.7 MMOL/L (ref 22–29)
CO2 BLDA-SCNC: 21.7 MMOL/L (ref 22–29)
CO2 BLDA-SCNC: 21.8 MMOL/L (ref 22–29)
CO2 BLDA-SCNC: 23.6 MMOL/L (ref 22–29)
CO2 SERPL-SCNC: 17 MMOL/L (ref 22–29)
CO2 SERPL-SCNC: 19 MMOL/L (ref 22–29)
CO2 SERPL-SCNC: 20 MMOL/L (ref 22–29)
CO2 SERPL-SCNC: 21 MMOL/L (ref 22–29)
CREAT SERPL-MCNC: 6.82 MG/DL (ref 0.76–1.27)
CREAT SERPL-MCNC: 7.2 MG/DL (ref 0.76–1.27)
CREAT SERPL-MCNC: 7.76 MG/DL (ref 0.76–1.27)
CREAT SERPL-MCNC: 8.17 MG/DL (ref 0.76–1.27)
D DIMER PPP FEU-MCNC: 6.07 MG/L (FEU) (ref 0–0.53)
DEPRECATED RDW RBC AUTO: 45.9 FL (ref 37–54)
DEPRECATED RDW RBC AUTO: 45.9 FL (ref 37–54)
DEPRECATED RDW RBC AUTO: 46.4 FL (ref 37–54)
DEPRECATED RDW RBC AUTO: 47.3 FL (ref 37–54)
EGFRCR SERPLBLD CKD-EPI 2021: 7.2 ML/MIN/1.73
EGFRCR SERPLBLD CKD-EPI 2021: 7.7 ML/MIN/1.73
EGFRCR SERPLBLD CKD-EPI 2021: 8.4 ML/MIN/1.73
EGFRCR SERPLBLD CKD-EPI 2021: 9 ML/MIN/1.73
EOSINOPHIL # BLD MANUAL: 0.14 10*3/MM3 (ref 0–0.4)
EOSINOPHIL NFR BLD MANUAL: 1 % (ref 0.3–6.2)
ERYTHROCYTE [DISTWIDTH] IN BLOOD BY AUTOMATED COUNT: 13.5 % (ref 12.3–15.4)
ERYTHROCYTE [DISTWIDTH] IN BLOOD BY AUTOMATED COUNT: 13.5 % (ref 12.3–15.4)
ERYTHROCYTE [DISTWIDTH] IN BLOOD BY AUTOMATED COUNT: 13.6 % (ref 12.3–15.4)
ERYTHROCYTE [DISTWIDTH] IN BLOOD BY AUTOMATED COUNT: 13.8 % (ref 12.3–15.4)
FIBRINOGEN PPP-MCNC: >860 MG/DL (ref 210–450)
GEN 5 2HR TROPONIN T REFLEX: 161 NG/L
GGT SERPL-CCNC: 76 U/L (ref 8–61)
GGT SERPL-CCNC: 79 U/L (ref 8–61)
GLOBULIN UR ELPH-MCNC: 3 GM/DL
GLOBULIN UR ELPH-MCNC: 3.4 GM/DL
GLUCOSE BLDC GLUCOMTR-MCNC: 126 MG/DL (ref 70–105)
GLUCOSE BLDC GLUCOMTR-MCNC: 158 MG/DL (ref 70–105)
GLUCOSE BLDC GLUCOMTR-MCNC: 17 MG/DL (ref 70–105)
GLUCOSE BLDC GLUCOMTR-MCNC: 192 MG/DL (ref 70–105)
GLUCOSE BLDC GLUCOMTR-MCNC: 22 MG/DL (ref 70–105)
GLUCOSE BLDC GLUCOMTR-MCNC: 223 MG/DL (ref 70–105)
GLUCOSE BLDC GLUCOMTR-MCNC: 239 MG/DL (ref 70–105)
GLUCOSE BLDC GLUCOMTR-MCNC: 292 MG/DL (ref 70–105)
GLUCOSE BLDC GLUCOMTR-MCNC: 33 MG/DL (ref 70–105)
GLUCOSE BLDC GLUCOMTR-MCNC: 59 MG/DL (ref 70–105)
GLUCOSE BLDC GLUCOMTR-MCNC: 76 MG/DL (ref 70–105)
GLUCOSE BLDC GLUCOMTR-MCNC: 78 MG/DL (ref 70–105)
GLUCOSE BLDC GLUCOMTR-MCNC: 83 MG/DL (ref 70–105)
GLUCOSE BLDC GLUCOMTR-MCNC: 85 MG/DL (ref 70–105)
GLUCOSE BLDC GLUCOMTR-MCNC: 98 MG/DL (ref 70–105)
GLUCOSE SERPL-MCNC: 164 MG/DL (ref 65–99)
GLUCOSE SERPL-MCNC: 26 MG/DL (ref 65–99)
GLUCOSE SERPL-MCNC: 348 MG/DL (ref 65–99)
GLUCOSE SERPL-MCNC: 92 MG/DL (ref 65–99)
HBA1C MFR BLD: 5.2 % (ref 4.8–5.6)
HCO3 BLDA-SCNC: 18.2 MMOL/L (ref 21–28)
HCO3 BLDA-SCNC: 19 MMOL/L (ref 21–28)
HCO3 BLDA-SCNC: 19.1 MMOL/L (ref 21–28)
HCO3 BLDA-SCNC: 19.9 MMOL/L (ref 21–28)
HCO3 BLDA-SCNC: 20.5 MMOL/L (ref 21–28)
HCO3 BLDA-SCNC: 20.5 MMOL/L (ref 21–28)
HCO3 BLDA-SCNC: 20.6 MMOL/L (ref 21–28)
HCO3 BLDA-SCNC: 22.4 MMOL/L (ref 21–28)
HCT VFR BLD AUTO: 21.6 % (ref 37.5–51)
HCT VFR BLD AUTO: 24.1 % (ref 37.5–51)
HCT VFR BLD AUTO: 24.3 % (ref 37.5–51)
HCT VFR BLD AUTO: 26.8 % (ref 37.5–51)
HEMODILUTION: NO
HGB BLD-MCNC: 7.2 G/DL (ref 13–17.7)
HGB BLD-MCNC: 8.3 G/DL (ref 13–17.7)
HGB BLD-MCNC: 8.5 G/DL (ref 13–17.7)
HGB BLD-MCNC: 9 G/DL (ref 13–17.7)
INHALED O2 CONCENTRATION: 100 %
INHALED O2 CONCENTRATION: 40 %
INR PPP: 1.19 (ref 0.93–1.1)
INR PPP: 1.19 (ref 0.93–1.1)
INR PPP: 1.2 (ref 0.93–1.1)
INR PPP: 1.22 (ref 0.93–1.1)
INR PPP: 1.25 (ref 0.93–1.1)
INSPIRATORY TIME: 1
INSPIRATORY TIME: 1
LARGE PLATELETS: ABNORMAL
LEFT ATRIUM VOLUME INDEX: 26.6 ML/M2
LIPASE SERPL-CCNC: 132 U/L (ref 13–60)
LIPASE SERPL-CCNC: 166 U/L (ref 13–60)
LIPASE SERPL-CCNC: 69 U/L (ref 13–60)
LIPASE SERPL-CCNC: 95 U/L (ref 13–60)
LYMPHOCYTES # BLD MANUAL: 0.5 10*3/MM3 (ref 0.7–3.1)
LYMPHOCYTES # BLD MANUAL: 0.64 10*3/MM3 (ref 0.7–3.1)
LYMPHOCYTES # BLD MANUAL: 0.71 10*3/MM3 (ref 0.7–3.1)
LYMPHOCYTES # BLD MANUAL: 1.08 10*3/MM3 (ref 0.7–3.1)
LYMPHOCYTES NFR BLD MANUAL: 1 % (ref 5–12)
LYMPHOCYTES NFR BLD MANUAL: 1 % (ref 5–12)
MAGNESIUM SERPL-MCNC: 2 MG/DL (ref 1.6–2.6)
MAGNESIUM SERPL-MCNC: 2.3 MG/DL (ref 1.6–2.6)
MAGNESIUM SERPL-MCNC: 2.5 MG/DL (ref 1.6–2.6)
MAGNESIUM SERPL-MCNC: 2.7 MG/DL (ref 1.6–2.6)
MCH RBC QN AUTO: 31.8 PG (ref 26.6–33)
MCH RBC QN AUTO: 31.8 PG (ref 26.6–33)
MCH RBC QN AUTO: 32.1 PG (ref 26.6–33)
MCH RBC QN AUTO: 32.5 PG (ref 26.6–33)
MCHC RBC AUTO-ENTMCNC: 33.4 G/DL (ref 31.5–35.7)
MCHC RBC AUTO-ENTMCNC: 33.5 G/DL (ref 31.5–35.7)
MCHC RBC AUTO-ENTMCNC: 34.2 G/DL (ref 31.5–35.7)
MCHC RBC AUTO-ENTMCNC: 34.8 G/DL (ref 31.5–35.7)
MCV RBC AUTO: 93 FL (ref 79–97)
MCV RBC AUTO: 93.4 FL (ref 79–97)
MCV RBC AUTO: 95.2 FL (ref 79–97)
MCV RBC AUTO: 96 FL (ref 79–97)
METAMYELOCYTES NFR BLD MANUAL: 3 % (ref 0–0)
METAMYELOCYTES NFR BLD MANUAL: 3 % (ref 0–0)
MODALITY: ABNORMAL
MONOCYTES # BLD: 0.14 10*3/MM3 (ref 0.1–0.9)
MONOCYTES # BLD: 0.16 10*3/MM3 (ref 0.1–0.9)
MRSA DNA SPEC QL NAA+PROBE: NORMAL
MYELOCYTES NFR BLD MANUAL: 1 % (ref 0–0)
NEUTROPHILS # BLD AUTO: 12.69 10*3/MM3 (ref 1.7–7)
NEUTROPHILS # BLD AUTO: 14.47 10*3/MM3 (ref 1.7–7)
NEUTROPHILS # BLD AUTO: 24.4 10*3/MM3 (ref 1.7–7)
NEUTROPHILS # BLD AUTO: 25.82 10*3/MM3 (ref 1.7–7)
NEUTROPHILS NFR BLD MANUAL: 77 % (ref 42.7–76)
NEUTROPHILS NFR BLD MANUAL: 78 % (ref 42.7–76)
NEUTROPHILS NFR BLD MANUAL: 91 % (ref 42.7–76)
NEUTROPHILS NFR BLD MANUAL: 94 % (ref 42.7–76)
NEUTS BAND NFR BLD MANUAL: 12 % (ref 0–5)
NEUTS BAND NFR BLD MANUAL: 14 % (ref 0–5)
NEUTS BAND NFR BLD MANUAL: 4 % (ref 0–5)
NEUTS BAND NFR BLD MANUAL: 5 % (ref 0–5)
NEUTS VAC BLD QL SMEAR: ABNORMAL
NRBC SPEC MANUAL: 1 /100 WBC (ref 0–0.2)
PAW @ PEAK INSP FLOW SETTING VENT: 25 CMH2O
PAW @ PEAK INSP FLOW SETTING VENT: 32 CMH2O
PCO2 BLDA: 30.3 MM HG (ref 35–48)
PCO2 BLDA: 33.6 MM HG (ref 35–48)
PCO2 BLDA: 37.1 MM HG (ref 35–48)
PCO2 BLDA: 37.2 MM HG (ref 35–48)
PCO2 BLDA: 38.3 MM HG (ref 35–48)
PCO2 BLDA: 39.9 MM HG (ref 35–48)
PCO2 BLDA: 40.1 MM HG (ref 35–48)
PCO2 BLDA: 40.8 MM HG (ref 35–48)
PEEP RESPIRATORY: 5 CM[H2O]
PEEP RESPIRATORY: 7 CM[H2O]
PH BLDA: 7.29 PH UNITS (ref 7.35–7.45)
PH BLDA: 7.31 PH UNITS (ref 7.35–7.45)
PH BLDA: 7.32 PH UNITS (ref 7.35–7.45)
PH BLDA: 7.32 PH UNITS (ref 7.35–7.45)
PH BLDA: 7.34 PH UNITS (ref 7.35–7.45)
PH BLDA: 7.35 PH UNITS (ref 7.35–7.45)
PH BLDA: 7.37 PH UNITS (ref 7.35–7.45)
PH BLDA: 7.42 PH UNITS (ref 7.35–7.45)
PHOSPHATE SERPL-MCNC: 2.8 MG/DL (ref 2.5–4.5)
PHOSPHATE SERPL-MCNC: 3.1 MG/DL (ref 2.5–4.5)
PHOSPHATE SERPL-MCNC: 3.5 MG/DL (ref 2.5–4.5)
PHOSPHATE SERPL-MCNC: 4.4 MG/DL (ref 2.5–4.5)
PLATELET # BLD AUTO: 117 10*3/MM3 (ref 140–450)
PLATELET # BLD AUTO: 119 10*3/MM3 (ref 140–450)
PLATELET # BLD AUTO: 120 10*3/MM3 (ref 140–450)
PLATELET # BLD AUTO: 147 10*3/MM3 (ref 140–450)
PMV BLD AUTO: 9.2 FL (ref 6–12)
PMV BLD AUTO: 9.5 FL (ref 6–12)
PMV BLD AUTO: 9.5 FL (ref 6–12)
PMV BLD AUTO: 9.7 FL (ref 6–12)
PO2 BLDA: 151.6 MM HG (ref 83–108)
PO2 BLDA: 450.5 MM HG (ref 83–108)
PO2 BLDA: 499.9 MM HG (ref 83–108)
PO2 BLDA: 554.4 MM HG (ref 83–108)
PO2 BLDA: 557.5 MM HG (ref 83–108)
PO2 BLDA: 71.1 MM HG (ref 83–108)
PO2 BLDA: 90.5 MM HG (ref 83–108)
PO2 BLDA: 92.6 MM HG (ref 83–108)
POLYCHROMASIA BLD QL SMEAR: ABNORMAL
POTASSIUM SERPL-SCNC: 4.2 MMOL/L (ref 3.5–5.2)
POTASSIUM SERPL-SCNC: 4.5 MMOL/L (ref 3.5–5.2)
POTASSIUM SERPL-SCNC: 5 MMOL/L (ref 3.5–5.2)
POTASSIUM SERPL-SCNC: 7.8 MMOL/L (ref 3.5–5.2)
POTASSIUM SERPL-SCNC: 7.9 MMOL/L (ref 3.5–5.2)
PROT SERPL-MCNC: 5 G/DL (ref 6–8.5)
PROT SERPL-MCNC: 5 G/DL (ref 6–8.5)
PROT SERPL-MCNC: 5.2 G/DL (ref 6–8.5)
PROT SERPL-MCNC: 5.9 G/DL (ref 6–8.5)
PROTHROMBIN TIME: 12.8 SECONDS (ref 9.6–11.7)
PROTHROMBIN TIME: 12.8 SECONDS (ref 9.6–11.7)
PROTHROMBIN TIME: 12.9 SECONDS (ref 9.6–11.7)
PROTHROMBIN TIME: 13.1 SECONDS (ref 9.6–11.7)
PROTHROMBIN TIME: 13.4 SECONDS (ref 9.6–11.7)
QT INTERVAL: 675 MS
QTC INTERVAL: 627 MS
RBC # BLD AUTO: 2.26 10*6/MM3 (ref 4.14–5.8)
RBC # BLD AUTO: 2.6 10*6/MM3 (ref 4.14–5.8)
RBC # BLD AUTO: 2.6 10*6/MM3 (ref 4.14–5.8)
RBC # BLD AUTO: 2.79 10*6/MM3 (ref 4.14–5.8)
RBC MORPH BLD: NORMAL
RESPIRATORY RATE: 30
RH BLD: POSITIVE
SAO2 % BLDCOA: 100 % (ref 94–98)
SAO2 % BLDCOA: 93.3 % (ref 94–98)
SAO2 % BLDCOA: 96.2 % (ref 94–98)
SAO2 % BLDCOA: 96.5 % (ref 94–98)
SAO2 % BLDCOA: 99.4 % (ref 94–98)
SCAN SLIDE: NORMAL
SINUS: 3.9 CM
SMALL PLATELETS BLD QL SMEAR: ABNORMAL
SMALL PLATELETS BLD QL SMEAR: ADEQUATE
SODIUM SERPL-SCNC: 136 MMOL/L (ref 136–145)
SODIUM SERPL-SCNC: 136 MMOL/L (ref 136–145)
SODIUM SERPL-SCNC: 137 MMOL/L (ref 136–145)
SODIUM SERPL-SCNC: 138 MMOL/L (ref 136–145)
T&S EXPIRATION DATE: NORMAL
TOXIC GRANULATION: ABNORMAL
TOXIC GRANULATION: ABNORMAL
TROPONIN T DELTA: -8 NG/L
TROPONIN T SERPL HS-MCNC: 169 NG/L
VARIANT LYMPHS NFR BLD MANUAL: 2 % (ref 19.6–45.3)
VARIANT LYMPHS NFR BLD MANUAL: 4 % (ref 19.6–45.3)
VARIANT LYMPHS NFR BLD MANUAL: 4 % (ref 19.6–45.3)
VARIANT LYMPHS NFR BLD MANUAL: 5 % (ref 19.6–45.3)
VENTILATOR MODE: ABNORMAL
VENTILATOR MODE: AC
VENTILATOR MODE: AC
WBC MORPH BLD: NORMAL
WBC MORPH BLD: NORMAL
WBC NRBC COR # BLD: 14.1 10*3/MM3 (ref 3.4–10.8)
WBC NRBC COR # BLD: 15.9 10*3/MM3 (ref 3.4–10.8)
WBC NRBC COR # BLD: 24.9 10*3/MM3 (ref 3.4–10.8)
WBC NRBC COR # BLD: 26.9 10*3/MM3 (ref 3.4–10.8)

## 2023-11-12 PROCEDURE — 25010000002 METHYLPREDNISOLONE PER 125 MG

## 2023-11-12 PROCEDURE — 86901 BLOOD TYPING SEROLOGIC RH(D): CPT

## 2023-11-12 PROCEDURE — 82330 ASSAY OF CALCIUM: CPT

## 2023-11-12 PROCEDURE — 82948 REAGENT STRIP/BLOOD GLUCOSE: CPT

## 2023-11-12 PROCEDURE — 85007 BL SMEAR W/DIFF WBC COUNT: CPT

## 2023-11-12 PROCEDURE — 82553 CREATINE MB FRACTION: CPT

## 2023-11-12 PROCEDURE — 82803 BLOOD GASES ANY COMBINATION: CPT

## 2023-11-12 PROCEDURE — 71250 CT THORAX DX C-: CPT

## 2023-11-12 PROCEDURE — 25010000002 CEFEPIME PER 500 MG

## 2023-11-12 PROCEDURE — 82330 ASSAY OF CALCIUM: CPT | Performed by: INTERNAL MEDICINE

## 2023-11-12 PROCEDURE — 83735 ASSAY OF MAGNESIUM: CPT

## 2023-11-12 PROCEDURE — 74176 CT ABD & PELVIS W/O CONTRAST: CPT

## 2023-11-12 PROCEDURE — 84132 ASSAY OF SERUM POTASSIUM: CPT

## 2023-11-12 PROCEDURE — 93306 TTE W/DOPPLER COMPLETE: CPT | Performed by: STUDENT IN AN ORGANIZED HEALTH CARE EDUCATION/TRAINING PROGRAM

## 2023-11-12 PROCEDURE — 85379 FIBRIN DEGRADATION QUANT: CPT

## 2023-11-12 PROCEDURE — 82977 ASSAY OF GGT: CPT

## 2023-11-12 PROCEDURE — 25010000002 LINEZOLID 600 MG/300ML SOLUTION

## 2023-11-12 PROCEDURE — 25010000002 VASOPRESSIN 0.2 UNIT/ML SOLUTION

## 2023-11-12 PROCEDURE — 63710000001 INSULIN REGULAR HUMAN PER 5 UNITS

## 2023-11-12 PROCEDURE — 93010 ELECTROCARDIOGRAM REPORT: CPT | Performed by: STUDENT IN AN ORGANIZED HEALTH CARE EDUCATION/TRAINING PROGRAM

## 2023-11-12 PROCEDURE — 87641 MR-STAPH DNA AMP PROBE: CPT

## 2023-11-12 PROCEDURE — 25010000002 PENICILLIN G BENZATHINE PER 1200000 UNITS: Performed by: INTERNAL MEDICINE

## 2023-11-12 PROCEDURE — 94003 VENT MGMT INPAT SUBQ DAY: CPT

## 2023-11-12 PROCEDURE — 25010000002 POTASSIUM CHLORIDE PER 2 MEQ OF POTASSIUM

## 2023-11-12 PROCEDURE — 94799 UNLISTED PULMONARY SVC/PX: CPT

## 2023-11-12 PROCEDURE — 25010000002 CALCIUM GLUCONATE 2-0.675 GM/100ML-% SOLUTION

## 2023-11-12 PROCEDURE — 85025 COMPLETE CBC W/AUTO DIFF WBC: CPT

## 2023-11-12 PROCEDURE — 83036 HEMOGLOBIN GLYCOSYLATED A1C: CPT

## 2023-11-12 PROCEDURE — 82150 ASSAY OF AMYLASE: CPT

## 2023-11-12 PROCEDURE — 83690 ASSAY OF LIPASE: CPT

## 2023-11-12 PROCEDURE — 84100 ASSAY OF PHOSPHORUS: CPT

## 2023-11-12 PROCEDURE — 85730 THROMBOPLASTIN TIME PARTIAL: CPT

## 2023-11-12 PROCEDURE — 93306 TTE W/DOPPLER COMPLETE: CPT

## 2023-11-12 PROCEDURE — 93005 ELECTROCARDIOGRAM TRACING: CPT

## 2023-11-12 PROCEDURE — 84100 ASSAY OF PHOSPHORUS: CPT | Performed by: INTERNAL MEDICINE

## 2023-11-12 PROCEDURE — 82248 BILIRUBIN DIRECT: CPT

## 2023-11-12 PROCEDURE — 80053 COMPREHEN METABOLIC PANEL: CPT

## 2023-11-12 PROCEDURE — 86900 BLOOD TYPING SEROLOGIC ABO: CPT

## 2023-11-12 PROCEDURE — 85384 FIBRINOGEN ACTIVITY: CPT

## 2023-11-12 PROCEDURE — 71045 X-RAY EXAM CHEST 1 VIEW: CPT

## 2023-11-12 PROCEDURE — 84484 ASSAY OF TROPONIN QUANT: CPT

## 2023-11-12 PROCEDURE — 0 DEXTROSE 5 % SOLUTION 250 ML FLEX CONT

## 2023-11-12 PROCEDURE — 94761 N-INVAS EAR/PLS OXIMETRY MLT: CPT

## 2023-11-12 PROCEDURE — 25010000002 MAGNESIUM SULFATE PER 500 MG OF MAGNESIUM

## 2023-11-12 PROCEDURE — 85610 PROTHROMBIN TIME: CPT

## 2023-11-12 PROCEDURE — 25010000002 HEPARIN (PORCINE) PER 1000 UNITS

## 2023-11-12 PROCEDURE — 83735 ASSAY OF MAGNESIUM: CPT | Performed by: INTERNAL MEDICINE

## 2023-11-12 PROCEDURE — 86850 RBC ANTIBODY SCREEN: CPT

## 2023-11-12 RX ORDER — DEXTROSE MONOHYDRATE 25 G/50ML
50 INJECTION, SOLUTION INTRAVENOUS ONCE
Status: COMPLETED | OUTPATIENT
Start: 2023-11-12 | End: 2023-11-12

## 2023-11-12 RX ORDER — DEXTROSE MONOHYDRATE 100 MG/ML
50 INJECTION, SOLUTION INTRAVENOUS CONTINUOUS
Status: DISPENSED | OUTPATIENT
Start: 2023-11-12

## 2023-11-12 RX ORDER — DEXTROSE MONOHYDRATE 25 G/50ML
50 INJECTION, SOLUTION INTRAVENOUS
Status: DISPENSED | OUTPATIENT
Start: 2023-11-12

## 2023-11-12 RX ORDER — HEPARIN SODIUM 10000 [USP'U]/100ML
500 INJECTION, SOLUTION INTRAVENOUS CONTINUOUS
Status: DISPENSED | OUTPATIENT
Start: 2023-11-12

## 2023-11-12 RX ORDER — CALCIUM CHLORIDE, MAGNESIUM CHLORIDE, DEXTROSE MONOHYDRATE, LACTIC ACID, SODIUM CHLORIDE, SODIUM BICARBONATE AND POTASSIUM CHLORIDE 5.15; 2.03; 22; 5.4; 6.46; 3.09; .157 G/L; G/L; G/L; G/L; G/L; G/L; G/L
2000 INJECTION INTRAVENOUS CONTINUOUS
Status: DISPENSED | OUTPATIENT
Start: 2023-11-12

## 2023-11-12 RX ORDER — HEPARIN SODIUM 1000 [USP'U]/ML
1000 INJECTION, SOLUTION INTRAVENOUS; SUBCUTANEOUS ONCE
Status: DISCONTINUED | OUTPATIENT
Start: 2023-11-12 | End: 2023-11-12 | Stop reason: SDUPTHER

## 2023-11-12 RX ORDER — CALCIUM CHLORIDE, MAGNESIUM CHLORIDE, SODIUM CHLORIDE, SODIUM BICARBONATE, POTASSIUM CHLORIDE AND SODIUM PHOSPHATE DIBASIC DIHYDRATE 3.68; 3.05; 6.34; 3.09; .314; .187 G/L; G/L; G/L; G/L; G/L; G/L
2000 INJECTION INTRAVENOUS CONTINUOUS
Status: DISPENSED | OUTPATIENT
Start: 2023-11-12

## 2023-11-12 RX ORDER — LINEZOLID 2 MG/ML
600 INJECTION, SOLUTION INTRAVENOUS EVERY 12 HOURS
Status: DISPENSED | OUTPATIENT
Start: 2023-11-12 | End: 2023-11-17

## 2023-11-12 RX ORDER — DEXTROSE MONOHYDRATE 25 G/50ML
INJECTION, SOLUTION INTRAVENOUS
Status: COMPLETED
Start: 2023-11-12 | End: 2023-11-12

## 2023-11-12 RX ORDER — CALCIUM GLUCONATE 20 MG/ML
2000 INJECTION, SOLUTION INTRAVENOUS ONCE
Status: COMPLETED | OUTPATIENT
Start: 2023-11-12 | End: 2023-11-12

## 2023-11-12 RX ORDER — NALOXONE HYDROCHLORIDE 1 MG/ML
10 INJECTION INTRAMUSCULAR; INTRAVENOUS; SUBCUTANEOUS ONCE
Status: DISPENSED | OUTPATIENT
Start: 2023-11-12

## 2023-11-12 RX ORDER — HEPARIN SODIUM 1000 [USP'U]/ML
500 INJECTION, SOLUTION INTRAVENOUS; SUBCUTANEOUS AS NEEDED
Status: DISCONTINUED | OUTPATIENT
Start: 2023-11-12 | End: 2023-11-12

## 2023-11-12 RX ORDER — HEPARIN SODIUM 1000 [USP'U]/ML
1000 INJECTION, SOLUTION INTRAVENOUS; SUBCUTANEOUS ONCE
Status: DISPENSED | OUTPATIENT
Start: 2023-11-12

## 2023-11-12 RX ORDER — HEPARIN SODIUM 1000 [USP'U]/ML
INJECTION, SOLUTION INTRAVENOUS; SUBCUTANEOUS AS NEEDED
Status: DISCONTINUED | OUTPATIENT
Start: 2023-11-12 | End: 2023-11-12

## 2023-11-12 RX ORDER — CALCIUM GLUCONATE 20 MG/ML
INJECTION, SOLUTION INTRAVENOUS
Status: COMPLETED
Start: 2023-11-12 | End: 2023-11-12

## 2023-11-12 RX ADMIN — SODIUM BICARBONATE 150 MEQ: 84 INJECTION INTRAVENOUS at 16:40

## 2023-11-12 RX ADMIN — CALCIUM CHLORIDE, MAGNESIUM CHLORIDE, DEXTROSE MONOHYDRATE, LACTIC ACID, SODIUM CHLORIDE, SODIUM BICARBONATE AND POTASSIUM CHLORIDE 2000 ML/HR: 5.15; 2.03; 22; 5.4; 6.46; 3.09; .157 INJECTION INTRAVENOUS at 10:56

## 2023-11-12 RX ADMIN — DEXTROSE MONOHYDRATE 50 ML: 25 INJECTION, SOLUTION INTRAVENOUS at 19:55

## 2023-11-12 RX ADMIN — INSULIN HUMAN 20 UNITS: 100 INJECTION, SOLUTION PARENTERAL at 02:30

## 2023-11-12 RX ADMIN — CEFEPIME 2000 MG: 2 INJECTION, POWDER, FOR SOLUTION INTRAVENOUS at 18:44

## 2023-11-12 RX ADMIN — SODIUM BICARBONATE 150 MEQ: 84 INJECTION INTRAVENOUS at 20:58

## 2023-11-12 RX ADMIN — HEPARIN SODIUM 500 UNITS/HR: 10000 INJECTION, SOLUTION INTRAVENOUS at 21:00

## 2023-11-12 RX ADMIN — Medication 150 MEQ: at 16:40

## 2023-11-12 RX ADMIN — CALCIUM GLUCONATE 2000 MG: 20 INJECTION, SOLUTION INTRAVENOUS at 16:41

## 2023-11-12 RX ADMIN — CALCIUM CHLORIDE, MAGNESIUM CHLORIDE, DEXTROSE MONOHYDRATE, LACTIC ACID, SODIUM CHLORIDE, SODIUM BICARBONATE AND POTASSIUM CHLORIDE 2000 ML/HR: 5.15; 2.03; 22; 5.4; 6.46; 3.09; .157 INJECTION INTRAVENOUS at 23:26

## 2023-11-12 RX ADMIN — CEFEPIME 2000 MG: 2 INJECTION, POWDER, FOR SOLUTION INTRAVENOUS at 11:00

## 2023-11-12 RX ADMIN — DEXTROSE MONOHYDRATE 50 ML: 25 INJECTION, SOLUTION INTRAVENOUS at 07:08

## 2023-11-12 RX ADMIN — LEVOTHYROXINE SODIUM 10 MCG/HR: 20 INJECTION, SOLUTION INTRAVENOUS at 20:58

## 2023-11-12 RX ADMIN — LINEZOLID 600 MG: 600 INJECTION, SOLUTION INTRAVENOUS at 16:25

## 2023-11-12 RX ADMIN — INSULIN HUMAN 10 UNITS: 100 INJECTION, SOLUTION PARENTERAL at 19:55

## 2023-11-12 RX ADMIN — MAGNESIUM SULFATE HEPTAHYDRATE 100 ML/HR: 500 INJECTION, SOLUTION INTRAMUSCULAR; INTRAVENOUS at 03:07

## 2023-11-12 RX ADMIN — MAGNESIUM SULFATE HEPTAHYDRATE 100 ML/HR: 500 INJECTION, SOLUTION INTRAMUSCULAR; INTRAVENOUS at 23:15

## 2023-11-12 RX ADMIN — DEXTROSE MONOHYDRATE 50 ML: 25 INJECTION, SOLUTION INTRAVENOUS at 02:30

## 2023-11-12 RX ADMIN — SODIUM CHLORIDE 2 G: 9 INJECTION, SOLUTION INTRAVENOUS at 02:30

## 2023-11-12 RX ADMIN — LEVOTHYROXINE SODIUM 10 MCG/HR: 20 INJECTION, SOLUTION INTRAVENOUS at 02:30

## 2023-11-12 RX ADMIN — CEFEPIME 2000 MG: 2 INJECTION, POWDER, FOR SOLUTION INTRAVENOUS at 03:07

## 2023-11-12 RX ADMIN — Medication 0.08 MCG/KG/MIN: at 23:55

## 2023-11-12 RX ADMIN — VASOPRESSIN 0.5 UNITS/HR: 0.2 INJECTION INTRAVENOUS at 02:18

## 2023-11-12 RX ADMIN — MAGNESIUM SULFATE HEPTAHYDRATE 100 ML/HR: 500 INJECTION, SOLUTION INTRAMUSCULAR; INTRAVENOUS at 13:12

## 2023-11-12 RX ADMIN — DEXTROSE MONOHYDRATE 50 ML: 25 INJECTION, SOLUTION INTRAVENOUS at 08:56

## 2023-11-12 RX ADMIN — PENICILLIN G BENZATHINE 2.4 MILLION UNITS: 1200000 INJECTION, SUSPENSION INTRAMUSCULAR at 07:40

## 2023-11-12 NOTE — PROGRESS NOTES
"                                                                                                                                      Nephrology  Progress Note                                        [facility]    Patient Identification    Name: Yusuf Jin  Age: 52 y.o.  Sex: male  :  1970  MRN: [id number]      DATE OF SERVICE:  2023        Subective    On the vent unresponsive  On Levophed  GUIDO  patient     Objective   Scheduled Meds:cefepime, 2,000 mg, Intravenous, Q8H  Naloxone HCl, 10 mg, Intravenous, Once  penicillin G benzathine, 2.4 Million Units, Intramuscular, Once          Continuous Infusions:cardioplegic solution HIGH ([facility] Tao), 100 mL/hr, Last Rate: 100 mL/hr (23 0307)  Levothyroxine Sodium 200 mcg in dextrose (D5W) 5 % 250 mL IVPB, 10 mcg/hr, Last Rate: 10 mcg/hr (23 023)  norepinephrine, 0.02-0.5 mcg/kg/min, Last Rate: 0.02 mcg/kg/min (23 044)  vasopressin, 0.05 Units/hr, Last Rate: 0.5 Units/hr (23 021)        PRN Meds:  Calcium Replacement - Follow Nurse / BPA Driven Protocol    dextrose    Magnesium Standard Dose Replacement - Follow Nurse / BPA Driven Protocol    Phosphorus Replacement - Follow Nurse / BPA Driven Protocol    Potassium Replacement - Follow Nurse / BPA Driven Protocol     Exam:  /59   Pulse 66   Temp 97 °F (36.1 °C)   Resp (!) 30   Ht 175.3 cm (69\")   Wt 91.7 kg (202 lb 2.6 oz)   SpO2 100%   BMI 29.85 kg/m²     Intake/Output last 3 shifts:  I/O last 3 completed shifts:  In: 1731 [I.V.:1731]  Out: 120 [Emesis/NG output:120]    Intake/Output this shift:  No intake/output data recorded.    Physical exam:  General Appearance: Trach vent presors    Head:  Normocephalic, without obvious abnormality, atraumatic  Eyes: conjunctiva/corneas clear     Neck:  Supple,  no adenopathy;      Lungs: Tunneled dialysis catheter noted decreased BS occasion debbychi  Heart:  Regular rate and rhythm, S1 and S2 " normal  Abdomen: PEG tube noted soft, non-tender, bowel sounds active   Extremities: trace edema  Pulses: 2+ and symmetric all extremities  Skin:  No rashes or lesions       Data Review:  All labs (24hrs):   Recent Results (from the past 24 hour(s))   POC Glucose Once    Collection Time: 11/11/23  8:12 AM    Specimen: Blood   Result Value Ref Range    Glucose 79 70 - 105 mg/dL   POC Glucose Once    Collection Time: 11/11/23  8:07 PM    Specimen: Blood   Result Value Ref Range    Glucose 78 70 - 105 mg/dL   Comprehensive Metabolic Panel    Collection Time: 11/12/23  1:43 AM    Specimen: Blood   Result Value Ref Range    Glucose 92 65 - 99 mg/dL    BUN 55 (H) 6 - 20 mg/dL    Creatinine 7.76 (H) 0.76 - 1.27 mg/dL    Sodium 136 136 - 145 mmol/L    Potassium 4.2 3.5 - 5.2 mmol/L    Chloride 106 98 - 107 mmol/L    CO2 20.0 (L) 22.0 - 29.0 mmol/L    Calcium 8.8 8.6 - 10.5 mg/dL    Total Protein 5.0 (L) 6.0 - 8.5 g/dL    Albumin 2.0 (L) 3.5 - 5.2 g/dL    ALT (SGPT) 57 (H) 1 - 41 U/L    AST (SGOT) 55 (H) 1 - 40 U/L    Alkaline Phosphatase 131 (H) 39 - 117 U/L    Total Bilirubin 0.3 0.0 - 1.2 mg/dL    Globulin 3.0 gm/dL    A/G Ratio 0.7 g/dL    BUN/Creatinine Ratio 7.1 7.0 - 25.0    Anion Gap 10.0 5.0 - 15.0 mmol/L    eGFR 7.7 (L) >60.0 mL/min/1.73   Magnesium    Collection Time: 11/12/23  1:43 AM    Specimen: Blood   Result Value Ref Range    Magnesium 2.0 1.6 - 2.6 mg/dL   Phosphorus    Collection Time: 11/12/23  1:43 AM    Specimen: Blood   Result Value Ref Range    Phosphorus 2.8 2.5 - 4.5 mg/dL   Calcium, Ionized    Collection Time: 11/12/23  1:43 AM    Specimen: Blood   Result Value Ref Range    Ionized Calcium 1.22 1.20 - 1.30 mmol/L   Amylase    Collection Time: 11/12/23  1:43 AM    Specimen: Blood   Result Value Ref Range    Amylase 127 (H) 28 - 100 U/L   Lipase    Collection Time: 11/12/23  1:43 AM    Specimen: Blood   Result Value Ref Range    Lipase 166 (H) 13 - 60 U/L   Protime-INR    Collection Time: 11/12/23   1:43 AM    Specimen: Blood   Result Value Ref Range    Protime 12.8 (H) 9.6 - 11.7 Seconds    INR 1.19 (H) 0.93 - 1.10   aPTT    Collection Time: 11/12/23  1:43 AM    Specimen: Blood   Result Value Ref Range    PTT 34.2 (H) 24.0 - 31.0 seconds   Hemoglobin A1c    Collection Time: 11/12/23  1:43 AM    Specimen: Blood   Result Value Ref Range    Hemoglobin A1C 5.20 4.80 - 5.60 %   CK-MB    Collection Time: 11/12/23  1:43 AM    Specimen: Blood   Result Value Ref Range    CKMB 5.13 <=10.40 ng/mL   High Sensitivity Troponin T    Collection Time: 11/12/23  1:43 AM    Specimen: Blood   Result Value Ref Range    HS Troponin T 169 (C) <22 ng/L   CBC Auto Differential    Collection Time: 11/12/23  1:43 AM    Specimen: Blood   Result Value Ref Range    WBC 14.10 (H) 3.40 - 10.80 10*3/mm3    RBC 2.60 (L) 4.14 - 5.80 10*6/mm3    Hemoglobin 8.3 (L) 13.0 - 17.7 g/dL    Hematocrit 24.1 (L) 37.5 - 51.0 %    MCV 93.0 79.0 - 97.0 fL    MCH 31.8 26.6 - 33.0 pg    MCHC 34.2 31.5 - 35.7 g/dL    RDW 13.5 12.3 - 15.4 %    RDW-SD 45.9 37.0 - 54.0 fl    MPV 9.7 6.0 - 12.0 fL    Platelets 117 (L) 140 - 450 10*3/mm3   Scan Slide    Collection Time: 11/12/23  1:43 AM    Specimen: Blood   Result Value Ref Range    Scan Slide     Manual Differential    Collection Time: 11/12/23  1:43 AM    Specimen: Blood   Result Value Ref Range    Neutrophil % 78.0 (H) 42.7 - 76.0 %    Lymphocyte % 5.0 (L) 19.6 - 45.3 %    Monocyte % 1.0 (L) 5.0 - 12.0 %    Eosinophil % 1.0 0.3 - 6.2 %    Bands %  12.0 (H) 0.0 - 5.0 %    Metamyelocyte % 3.0 (H) 0.0 - 0.0 %    Neutrophils Absolute 12.69 (H) 1.70 - 7.00 10*3/mm3    Lymphocytes Absolute 0.71 0.70 - 3.10 10*3/mm3    Monocytes Absolute 0.14 0.10 - 0.90 10*3/mm3    Eosinophils Absolute 0.14 0.00 - 0.40 10*3/mm3    Polychromasia Slight/1+ None Seen    WBC Morphology Normal Normal    Platelet Estimate Decreased Normal    Large Platelets Slight/1+ None Seen   POC Glucose Once    Collection Time: 11/12/23  1:46 AM     Specimen: Blood   Result Value Ref Range    Glucose 85 70 - 105 mg/dL   POC Glucose Once    Collection Time: 11/12/23  1:59 AM    Specimen: Blood   Result Value Ref Range    Glucose 78 70 - 105 mg/dL   Blood Gas, Arterial -    Collection Time: 11/12/23  2:10 AM    Specimen: Arterial Blood   Result Value Ref Range    Site Arterial Line     Glenroy's Test Positive     pH, Arterial 7.425 7.350 - 7.450 pH units    pCO2, Arterial 30.3 (L) 35.0 - 48.0 mm Hg    pO2, Arterial 151.6 (H) 83.0 - 108.0 mm Hg    HCO3, Arterial 19.9 (L) 21.0 - 28.0 mmol/L    Base Excess, Arterial -4.0 (L) 0.0 - 3.0 mmol/L    O2 Saturation, Arterial 99.4 (H) 94.0 - 98.0 %    CO2 Content 20.8 (L) 22 - 29 mmol/L    Barometric Pressure for Blood Gas      Modality Adult Vent     FIO2 40 %    Ventilator Mode PC     PEEP 5     Hemodilution No     Respiratory Rate 30     Inspiratory Time 1    ECG 12 Lead Other; ashley    Collection Time: 11/12/23  2:13 AM   Result Value Ref Range    QT Interval 675 ms    QTC Interval 627 ms   Type & Screen    Collection Time: 11/12/23  2:17 AM    Specimen: Blood   Result Value Ref Range    ABO Type O     RH type Positive     Antibody Screen Negative     T&S Expiration Date 11/15/2023 11:59:59 PM    MRSA Screen, PCR (Inpatient) - Swab, Nares    Collection Time: 11/12/23  2:17 AM    Specimen: Nares; Swab   Result Value Ref Range    MRSA PCR No MRSA Detected No MRSA Detected   POC Glucose Once    Collection Time: 11/12/23  3:00 AM    Specimen: Blood   Result Value Ref Range    Glucose 158 (H) 70 - 105 mg/dL   High Sensitivity Troponin T 2Hr    Collection Time: 11/12/23  3:51 AM    Specimen: Blood   Result Value Ref Range    HS Troponin T 161 (C) <22 ng/L    Troponin T Delta -8 (L) >=-4 - <+4 ng/L   Potassium    Collection Time: 11/12/23  4:32 AM    Specimen: Blood   Result Value Ref Range    Potassium 4.5 3.5 - 5.2 mmol/L   POC Glucose Once    Collection Time: 11/12/23  4:40 AM    Specimen: Blood   Result Value Ref Range     Glucose 76 70 - 105 mg/dL   Blood Gas, Arterial -    Collection Time: 11/12/23  5:49 AM    Specimen: Arterial Blood   Result Value Ref Range    Site Arterial Line     Glenroy's Test N/A     pH, Arterial 7.351 7.350 - 7.450 pH units    pCO2, Arterial 37.1 35.0 - 48.0 mm Hg    pO2, Arterial 557.5 (H) 83.0 - 108.0 mm Hg    HCO3, Arterial 20.6 (L) 21.0 - 28.0 mmol/L    Base Excess, Arterial -4.6 (L) 0.0 - 3.0 mmol/L    O2 Saturation, Arterial 100.0 (H) 94.0 - 98.0 %    CO2 Content 21.7 (L) 22 - 29 mmol/L    Barometric Pressure for Blood Gas      Modality Adult Vent     FIO2 100 %    Ventilator Mode AC     PEEP 5     PIP 25 cmH2O    Hemodilution No     Respiratory Rate 30    Comprehensive Metabolic Panel    Collection Time: 11/12/23  6:13 AM    Specimen: Blood   Result Value Ref Range    Glucose 26 (C) 65 - 99 mg/dL    BUN 53 (H) 6 - 20 mg/dL    Creatinine 8.17 (H) 0.76 - 1.27 mg/dL    Sodium 138 136 - 145 mmol/L    Potassium 5.0 3.5 - 5.2 mmol/L    Chloride 107 98 - 107 mmol/L    CO2 21.0 (L) 22.0 - 29.0 mmol/L    Calcium 8.7 8.6 - 10.5 mg/dL    Total Protein 5.2 (L) 6.0 - 8.5 g/dL    Albumin 2.2 (L) 3.5 - 5.2 g/dL    ALT (SGPT) 56 (H) 1 - 41 U/L    AST (SGOT) 55 (H) 1 - 40 U/L    Alkaline Phosphatase 134 (H) 39 - 117 U/L    Total Bilirubin 0.4 0.0 - 1.2 mg/dL    Globulin 3.0 gm/dL    A/G Ratio 0.7 g/dL    BUN/Creatinine Ratio 6.5 (L) 7.0 - 25.0    Anion Gap 10.0 5.0 - 15.0 mmol/L    eGFR 7.2 (L) >60.0 mL/min/1.73   Magnesium    Collection Time: 11/12/23  6:13 AM    Specimen: Blood   Result Value Ref Range    Magnesium 2.3 1.6 - 2.6 mg/dL   Phosphorus    Collection Time: 11/12/23  6:13 AM    Specimen: Blood   Result Value Ref Range    Phosphorus 3.1 2.5 - 4.5 mg/dL   Calcium, Ionized    Collection Time: 11/12/23  6:13 AM    Specimen: Blood   Result Value Ref Range    Ionized Calcium 1.23 1.20 - 1.30 mmol/L   Amylase    Collection Time: 11/12/23  6:13 AM    Specimen: Blood   Result Value Ref Range    Amylase 127 (H) 28 -  100 U/L   Lipase    Collection Time: 11/12/23  6:13 AM    Specimen: Blood   Result Value Ref Range    Lipase 132 (H) 13 - 60 U/L   Protime-INR    Collection Time: 11/12/23  6:13 AM    Specimen: Blood   Result Value Ref Range    Protime 12.8 (H) 9.6 - 11.7 Seconds    INR 1.19 (H) 0.93 - 1.10   aPTT    Collection Time: 11/12/23  6:13 AM    Specimen: Blood   Result Value Ref Range    PTT 35.0 (H) 24.0 - 31.0 seconds   CBC Auto Differential    Collection Time: 11/12/23  6:13 AM    Specimen: Blood   Result Value Ref Range    WBC 15.90 (H) 3.40 - 10.80 10*3/mm3    RBC 2.60 (L) 4.14 - 5.80 10*6/mm3    Hemoglobin 8.5 (L) 13.0 - 17.7 g/dL    Hematocrit 24.3 (L) 37.5 - 51.0 %    MCV 93.4 79.0 - 97.0 fL    MCH 32.5 26.6 - 33.0 pg    MCHC 34.8 31.5 - 35.7 g/dL    RDW 13.5 12.3 - 15.4 %    RDW-SD 46.4 37.0 - 54.0 fl    MPV 9.5 6.0 - 12.0 fL    Platelets 120 (L) 140 - 450 10*3/mm3   Scan Slide    Collection Time: 11/12/23  6:13 AM    Specimen: Blood   Result Value Ref Range    Scan Slide     Manual Differential    Collection Time: 11/12/23  6:13 AM    Specimen: Blood   Result Value Ref Range    Neutrophil % 77.0 (H) 42.7 - 76.0 %    Lymphocyte % 4.0 (L) 19.6 - 45.3 %    Monocyte % 1.0 (L) 5.0 - 12.0 %    Bands %  14.0 (H) 0.0 - 5.0 %    Metamyelocyte % 3.0 (H) 0.0 - 0.0 %    Myelocyte % 1.0 (H) 0.0 - 0.0 %    Neutrophils Absolute 14.47 (H) 1.70 - 7.00 10*3/mm3    Lymphocytes Absolute 0.64 (L) 0.70 - 3.10 10*3/mm3    Monocytes Absolute 0.16 0.10 - 0.90 10*3/mm3    RBC Morphology Normal Normal    Toxic Granulation Slight/1+ None Seen    Vacuolated Neutrophils Slight/1+ None Seen    Platelet Estimate Decreased Normal   POC Glucose Once    Collection Time: 11/12/23  6:39 AM    Specimen: Blood   Result Value Ref Range    Glucose 17 (C) 70 - 105 mg/dL   POC Glucose Once    Collection Time: 11/12/23  7:04 AM    Specimen: Blood   Result Value Ref Range    Glucose 22 (C) 70 - 105 mg/dL          Imaging:  XR Chest 1 View    Result Date:  11/12/2023  Impression: Cardiomegaly with diffuse pulmonary edema. Electronically Signed: Zurdo Yañez MD  11/12/2023 3:32 AM EST  Workstation ID: [id number]    NM Brain Scan Complete With Vascular Flow    Result Date: 11/10/2023  Impression: No intracranial radiopharmaceutical uptake is seen, corresponding to a clinical suspicion of brain death. I spoke with the patient's nurse, Melba, regarding the findings of this examination on 11/10/2023 at 1:50 p.m. Electronically Signed: Yaritza Chaparro MD  11/10/2023 1:51 PM EST  Workstation ID: [id number]    XR Chest 1 View    Result Date: 11/10/2023  Impression: 1. Placement of tracheostomy tube with tip above the andry. 2. NG tube removed. 3. Stable right upper extremity PICC and left sided central venous catheter. 4. Stable findings suggesting pulmonary edema. Electronically Signed: Andrea Galarza MD  11/10/2023 7:15 AM EST  Workstation ID: [id number]    XR Abdomen KUB    Result Date: 11/9/2023  Impression: Nonobstructive bowel gas pattern. Mild stool burden present. Gastrostomy tube within the left hemiabdomen. Electronically Signed: Caitlyn Urbina MD  11/9/2023 2:00 AM EST  Workstation ID: [id number]    XR Abdomen KUB    Result Date: 11/8/2023  Impression: 1. Moderate gas distention of mid abdominal bowel, thought to represent the colon. Findings may reflect changes of mild ileus. 2. No convincing evidence of high-grade bowel obstruction on this examination. 3. Percutaneous gastrostomy tube projects of the left upper quadrant of the abdomen, newly placed since the prior study. No gross free intraperitoneal air is evident. Electronically Signed: Yaritza Chaparro MD  11/8/2023 8:44 AM EST  Workstation ID: [id number]    IR Insert Tunneled CV Catheter Without Port 5 Plus    Result Date: 11/7/2023  1. Successful placement of left IJ PermCath. Electronically Signed: Agus Perez MD  11/7/2023 11:16 AM EST  Workstation ID: [id number]    US Liver    Result Date: 11/6/2023  Impression: No  acute sonographic abnormality of the liver. Electronically Signed: Andrea Galarza MD  11/6/2023 7:43 AM EST  Workstation ID: [id number]    XR Chest 1 View    Result Date: 11/6/2023  Impression: 1. Stable lines and support tubes. 2. Negative for pneumothorax. 3. Persistent central pulmonary vascular congestion and findings suggesting mild pulmonary edema, not significantly changed from the prior study. Electronically Signed: Andrea Galarza MD  11/6/2023 7:07 AM EST  Workstation ID: [id number]    CT Abdomen Pelvis Without Contrast    Result Date: 11/5/2023  Impression: 1. Nonspecific fluid and mixed liquid stool throughout the abdomen without signs of overt obstruction or active inflammation. Correlate for diarrheal illness. 2. Increasing patchy airspace opacities in the lower lobes suspicious for pneumonia versus aspiration. 3. Retained contrast in the right kidney suggesting acute tubular necrosis. No hydronephrosis. 4. Findings of mild volume overload/third spacing including body wall edema, presacral edema and trace ascites. No drainable collection. 5. Other ancillary findings as above. Electronically Signed: Jermaine Heath MD  11/5/2023 11:15 AM EST  Workstation ID: [id number]    MRI Brain Without Contrast    Result Date: 11/5/2023  Impression: MRI findings suggesting diffuse anoxic brain injury. Electronically Signed: Jermaine Heath MD  11/5/2023 10:57 AM EST  Workstation ID: [id number]    XR Chest 1 View    Result Date: 11/5/2023  Impression: 1. Improving vascular congestion and pulmonary edema. 2. Support devices as above, including high ET tube 8.7 cm above andry similar in position to prior exam. Electronically Signed: Jermaine Heath MD  11/5/2023 8:33 AM EST  Workstation ID: [id number]     Assessment/Plan:     Cardiac arrest with successful resuscitation    Marijuana abuse    Alcohol abuse    Elevated blood sugar       Acute kidney injury   Severe metabolic/ respiratory acidosis   Cardiopulmonary arrest   ARDS    Possible anoxic brain injury   Hyperglycemia   Elevated troponin   Hypokalemia   Hypernatremia   Lactic acidosis  Recommendations:  Asked me to do CRRT GUIDO to keep instructions down for now  CRRT ordered

## 2023-11-12 NOTE — PROGRESS NOTES
This note is to clarify some of my statements and reasons for not doing apnea test      There is a concern that sometimes patients develop stress and dysrhythmia during apnea test and occasionally it has been seen in patients that despite all efforts the dysrhythmia is not controlled and we lose patients as they may never be resuscitated back to spontaneous good or normal rhythm.  So we have decided not to do an apnea test, instead focus on neurological examination and brain perfusion scan, in the appropriate setting to declare the patient brain-dead, if clinical and other criteria are met.    Similarly on EEG, the sensitivity has to be so high that there is significant interference from all that is going on in the room including ventilator and others that there is so much artifact that my personal preference is not to use that as the only test so again we use the criteria as discussed above      In this particular case the gentleman had a catastrophic event    We give him enough time and addressed all his metabolic issues    When exam was done he had temperature within normal range  No sedation  No cough  No gag    He had very nonspecific reflux type movement but nothing purposeful    Obviously he was in coma  No response to verbal stimuli or other painful stimuli other than the reflex    Obviously nonverbal    On cranial nerve examination I mentioned 0/3 eye signs    Obviously there was no response to visual threats, no response to the light, no pupil reactivity    No doll's eyes/doll's eyes, obviously that covers cranial nerve III for an 6 and also 8  No response to cold calorics    No corneal reflexes, obviously that covers cranial nerve V and VII    No gag    No cough    No respiratory effort    No other responsive to pain or sensory stimuli except for the spinal type nonspecific movement of the head    No other motor responses    He is not sedated when examination was done    When examination was done he had  normal temperature and basic labs are okay    The brain perfusion scan was done at 11:49 AM Eastern standard time on November 10, 2023  It was reported to the nursing staff at 1:50 PM on November 10, 2023    It was transcribed and signed at 1:51 PM on November 10, 2023    (Institutions take the reporting time as the time of that and others take the time of actually the test being performed.  I will have the death certificate team discussed that.)      Addendum    For clarification reason, the time of death for this patient would be the reporting of the brain perfusion scan:    1:50 PM, November 10, 2023    The patient was examined close to 12:15 PM on November 10, 2023 and clinically he met the brain death criteria but the official response would be after ancillary and confirmatory tests are done and that is why it would be reporting of the brain perfusion scan as mentioned above.      Addendum    This is to confirm that the patient was examined both by myself and Dr. Mendieta.    He was present and assisted to me in doing the cold calorics  Also he help me determine that the patient had no respiratory effort even when the ventilator was turned off    No response to pain of any sort  No response to pain with facial stimuli  No cough  No gag  No doll's eyes  No pupillary responses    After that the time was death and declaration was made on the discharge summary when the confirmation test, nuclear brain perfusion scan was available.

## 2023-11-13 ENCOUNTER — ANESTHESIA (OUTPATIENT)
Dept: PERIOP | Facility: HOSPITAL | Age: 53
DRG: 004 | End: 2023-11-13
Payer: MEDICAID

## 2023-11-13 ENCOUNTER — ANESTHESIA EVENT (OUTPATIENT)
Dept: PERIOP | Facility: HOSPITAL | Age: 53
DRG: 004 | End: 2023-11-13
Payer: MEDICAID

## 2023-11-13 VITALS
OXYGEN SATURATION: 100 % | WEIGHT: 195.11 LBS | HEART RATE: 98 BPM | RESPIRATION RATE: 30 BRPM | DIASTOLIC BLOOD PRESSURE: 92 MMHG | TEMPERATURE: 96.1 F | SYSTOLIC BLOOD PRESSURE: 141 MMHG | BODY MASS INDEX: 28.9 KG/M2 | HEIGHT: 69 IN

## 2023-11-13 LAB
ALBUMIN SERPL-MCNC: 2.6 G/DL (ref 3.5–5.2)
ALBUMIN SERPL-MCNC: 2.9 G/DL (ref 3.5–5.2)
ALBUMIN SERPL-MCNC: 2.9 G/DL (ref 3.5–5.2)
ALBUMIN SERPL-MCNC: 3.2 G/DL (ref 3.5–5.2)
ALBUMIN SERPL-MCNC: 3.2 G/DL (ref 3.5–5.2)
ALBUMIN/GLOB SERPL: 0.8 G/DL
ALBUMIN/GLOB SERPL: 1 G/DL
ALBUMIN/GLOB SERPL: 1.1 G/DL
ALP SERPL-CCNC: 155 U/L (ref 39–117)
ALP SERPL-CCNC: 170 U/L (ref 39–117)
ALP SERPL-CCNC: 191 U/L (ref 39–117)
ALT SERPL W P-5'-P-CCNC: 48 U/L (ref 1–41)
ALT SERPL W P-5'-P-CCNC: 54 U/L (ref 1–41)
ALT SERPL W P-5'-P-CCNC: 57 U/L (ref 1–41)
AMYLASE SERPL-CCNC: 116 U/L (ref 28–100)
AMYLASE SERPL-CCNC: 143 U/L (ref 28–100)
AMYLASE SERPL-CCNC: 165 U/L (ref 28–100)
ANION GAP SERPL CALCULATED.3IONS-SCNC: 11 MMOL/L (ref 5–15)
ANION GAP SERPL CALCULATED.3IONS-SCNC: 14 MMOL/L (ref 5–15)
ANION GAP SERPL CALCULATED.3IONS-SCNC: 6 MMOL/L (ref 5–15)
ANION GAP SERPL CALCULATED.3IONS-SCNC: 7 MMOL/L (ref 5–15)
ANION GAP SERPL CALCULATED.3IONS-SCNC: 9 MMOL/L (ref 5–15)
APTT PPP: 37.9 SECONDS (ref 24–31)
APTT PPP: 38.4 SECONDS (ref 24–31)
APTT PPP: 38.9 SECONDS (ref 24–31)
APTT PPP: 39.2 SECONDS (ref 24–31)
APTT PPP: 39.4 SECONDS (ref 24–31)
APTT PPP: 39.4 SECONDS (ref 24–31)
APTT PPP: 40.2 SECONDS (ref 24–31)
APTT PPP: 41.2 SECONDS (ref 24–31)
APTT PPP: 41.7 SECONDS (ref 24–31)
ARTERIAL PATENCY WRIST A: ABNORMAL
AST SERPL-CCNC: 42 U/L (ref 1–40)
AST SERPL-CCNC: 46 U/L (ref 1–40)
AST SERPL-CCNC: 49 U/L (ref 1–40)
ATMOSPHERIC PRESS: ABNORMAL MM[HG]
BACTERIA UR QL AUTO: ABNORMAL /HPF
BASE EXCESS BLDA CALC-SCNC: -0.8 MMOL/L (ref 0–3)
BASE EXCESS BLDA CALC-SCNC: -2.4 MMOL/L (ref 0–3)
BASE EXCESS BLDA CALC-SCNC: -5.2 MMOL/L (ref 0–3)
BASOPHILS # BLD AUTO: 0.2 10*3/MM3 (ref 0–0.2)
BASOPHILS NFR BLD AUTO: 0.6 % (ref 0–1.5)
BDY SITE: ABNORMAL
BILIRUB CONJ SERPL-MCNC: 0.2 MG/DL (ref 0–0.3)
BILIRUB CONJ SERPL-MCNC: <0.2 MG/DL (ref 0–0.3)
BILIRUB CONJ SERPL-MCNC: <0.2 MG/DL (ref 0–0.3)
BILIRUB SERPL-MCNC: 0.3 MG/DL (ref 0–1.2)
BILIRUB UR QL STRIP: ABNORMAL
BLASTS NFR BLD MANUAL: 1 % (ref 0–0)
BUN SERPL-MCNC: 14 MG/DL (ref 6–20)
BUN SERPL-MCNC: 18 MG/DL (ref 6–20)
BUN SERPL-MCNC: 21 MG/DL (ref 6–20)
BUN SERPL-MCNC: 25 MG/DL (ref 6–20)
BUN SERPL-MCNC: 38 MG/DL (ref 6–20)
BUN/CREAT SERPL: 6.7 (ref 7–25)
BUN/CREAT SERPL: 7 (ref 7–25)
BUN/CREAT SERPL: 7.1 (ref 7–25)
BUN/CREAT SERPL: 7.6 (ref 7–25)
BUN/CREAT SERPL: 7.7 (ref 7–25)
CA-I SERPL ISE-MCNC: 1.22 MMOL/L (ref 1.2–1.3)
CA-I SERPL ISE-MCNC: 1.27 MMOL/L (ref 1.2–1.3)
CA-I SERPL ISE-MCNC: 1.28 MMOL/L (ref 1.2–1.3)
CA-I SERPL ISE-MCNC: 1.3 MMOL/L (ref 1.2–1.3)
CA-I SERPL ISE-MCNC: 1.32 MMOL/L (ref 1.2–1.3)
CALCIUM SPEC-SCNC: 10 MG/DL (ref 8.6–10.5)
CALCIUM SPEC-SCNC: 10.1 MG/DL (ref 8.6–10.5)
CALCIUM SPEC-SCNC: 10.5 MG/DL (ref 8.6–10.5)
CALCIUM SPEC-SCNC: 9.3 MG/DL (ref 8.6–10.5)
CALCIUM SPEC-SCNC: 9.8 MG/DL (ref 8.6–10.5)
CHLORIDE SERPL-SCNC: 104 MMOL/L (ref 98–107)
CHLORIDE SERPL-SCNC: 105 MMOL/L (ref 98–107)
CHLORIDE SERPL-SCNC: 106 MMOL/L (ref 98–107)
CHLORIDE SERPL-SCNC: 106 MMOL/L (ref 98–107)
CHLORIDE SERPL-SCNC: 107 MMOL/L (ref 98–107)
CLARITY UR: ABNORMAL
CO2 BLDA-SCNC: 22.4 MMOL/L (ref 22–29)
CO2 BLDA-SCNC: 24.6 MMOL/L (ref 22–29)
CO2 BLDA-SCNC: 26.1 MMOL/L (ref 22–29)
CO2 SERPL-SCNC: 21 MMOL/L (ref 22–29)
CO2 SERPL-SCNC: 25 MMOL/L (ref 22–29)
CO2 SERPL-SCNC: 27 MMOL/L (ref 22–29)
COLOR UR: ABNORMAL
CREAT SERPL-MCNC: 2.01 MG/DL (ref 0.76–1.27)
CREAT SERPL-MCNC: 2.34 MG/DL (ref 0.76–1.27)
CREAT SERPL-MCNC: 3.14 MG/DL (ref 0.76–1.27)
CREAT SERPL-MCNC: 3.54 MG/DL (ref 0.76–1.27)
CREAT SERPL-MCNC: 4.99 MG/DL (ref 0.76–1.27)
DEPRECATED RDW RBC AUTO: 45.9 FL (ref 37–54)
DEPRECATED RDW RBC AUTO: 46.4 FL (ref 37–54)
DEPRECATED RDW RBC AUTO: 48.1 FL (ref 37–54)
EGFRCR SERPLBLD CKD-EPI 2021: 13.1 ML/MIN/1.73
EGFRCR SERPLBLD CKD-EPI 2021: 19.7 ML/MIN/1.73
EGFRCR SERPLBLD CKD-EPI 2021: 22.8 ML/MIN/1.73
EGFRCR SERPLBLD CKD-EPI 2021: 32.4 ML/MIN/1.73
EGFRCR SERPLBLD CKD-EPI 2021: 38.9 ML/MIN/1.73
EOSINOPHIL # BLD AUTO: 0 10*3/MM3 (ref 0–0.4)
EOSINOPHIL NFR BLD AUTO: 0.1 % (ref 0.3–6.2)
ERYTHROCYTE [DISTWIDTH] IN BLOOD BY AUTOMATED COUNT: 13.8 % (ref 12.3–15.4)
ERYTHROCYTE [DISTWIDTH] IN BLOOD BY AUTOMATED COUNT: 13.9 % (ref 12.3–15.4)
ERYTHROCYTE [DISTWIDTH] IN BLOOD BY AUTOMATED COUNT: 13.9 % (ref 12.3–15.4)
GEN 5 2HR TROPONIN T REFLEX: 122 NG/L
GGT SERPL-CCNC: 75 U/L (ref 8–61)
GGT SERPL-CCNC: 80 U/L (ref 8–61)
GGT SERPL-CCNC: 80 U/L (ref 8–61)
GGT SERPL-CCNC: 90 U/L (ref 8–61)
GGT SERPL-CCNC: 98 U/L (ref 8–61)
GLOBULIN UR ELPH-MCNC: 2.8 GM/DL
GLOBULIN UR ELPH-MCNC: 3 GM/DL
GLOBULIN UR ELPH-MCNC: 3.4 GM/DL
GLUCOSE BLDC GLUCOMTR-MCNC: 134 MG/DL (ref 70–105)
GLUCOSE BLDC GLUCOMTR-MCNC: 144 MG/DL (ref 70–105)
GLUCOSE BLDC GLUCOMTR-MCNC: 148 MG/DL (ref 70–105)
GLUCOSE BLDC GLUCOMTR-MCNC: 148 MG/DL (ref 70–105)
GLUCOSE BLDC GLUCOMTR-MCNC: 171 MG/DL (ref 70–105)
GLUCOSE BLDC GLUCOMTR-MCNC: 198 MG/DL (ref 70–105)
GLUCOSE BLDC GLUCOMTR-MCNC: 211 MG/DL (ref 70–105)
GLUCOSE BLDC GLUCOMTR-MCNC: 216 MG/DL (ref 70–105)
GLUCOSE SERPL-MCNC: 170 MG/DL (ref 65–99)
GLUCOSE SERPL-MCNC: 173 MG/DL (ref 65–99)
GLUCOSE SERPL-MCNC: 199 MG/DL (ref 65–99)
GLUCOSE SERPL-MCNC: 207 MG/DL (ref 65–99)
GLUCOSE SERPL-MCNC: 260 MG/DL (ref 65–99)
GLUCOSE UR STRIP-MCNC: NEGATIVE MG/DL
HCO3 BLDA-SCNC: 21 MMOL/L (ref 21–28)
HCO3 BLDA-SCNC: 23.3 MMOL/L (ref 21–28)
HCO3 BLDA-SCNC: 24.7 MMOL/L (ref 21–28)
HCT VFR BLD AUTO: 18.2 % (ref 37.5–51)
HCT VFR BLD AUTO: 20.6 % (ref 37.5–51)
HCT VFR BLD AUTO: 24.2 % (ref 37.5–51)
HEMODILUTION: NO
HGB BLD-MCNC: 6 G/DL (ref 13–17.7)
HGB BLD-MCNC: 7 G/DL (ref 13–17.7)
HGB BLD-MCNC: 8 G/DL (ref 13–17.7)
HGB UR QL STRIP.AUTO: ABNORMAL
HYALINE CASTS UR QL AUTO: ABNORMAL /LPF
INHALED O2 CONCENTRATION: 100 %
INHALED O2 CONCENTRATION: 100 %
INHALED O2 CONCENTRATION: 40 %
INR PPP: 1.19 (ref 0.93–1.1)
INR PPP: 1.2 (ref 0.93–1.1)
INR PPP: 1.2 (ref 0.93–1.1)
INR PPP: 1.21 (ref 0.93–1.1)
INR PPP: 1.23 (ref 0.93–1.1)
INSPIRATORY TIME: 1
KETONES UR QL STRIP: ABNORMAL
LARGE PLATELETS: ABNORMAL
LARGE PLATELETS: NORMAL
LEUKOCYTE ESTERASE UR QL STRIP.AUTO: NEGATIVE
LIPASE SERPL-CCNC: 68 U/L (ref 13–60)
LIPASE SERPL-CCNC: 72 U/L (ref 13–60)
LIPASE SERPL-CCNC: 74 U/L (ref 13–60)
LYMPHOCYTES # BLD AUTO: 1.1 10*3/MM3 (ref 0.7–3.1)
LYMPHOCYTES # BLD MANUAL: 0.29 10*3/MM3 (ref 0.7–3.1)
LYMPHOCYTES # BLD MANUAL: 1 10*3/MM3 (ref 0.7–3.1)
LYMPHOCYTES NFR BLD AUTO: 3.2 % (ref 19.6–45.3)
LYMPHOCYTES NFR BLD MANUAL: 1 % (ref 5–12)
LYMPHOCYTES NFR BLD MANUAL: 2 % (ref 5–12)
MAGNESIUM SERPL-MCNC: 2.3 MG/DL (ref 1.6–2.6)
MAGNESIUM SERPL-MCNC: 2.3 MG/DL (ref 1.6–2.6)
MAGNESIUM SERPL-MCNC: 2.4 MG/DL (ref 1.6–2.6)
MAGNESIUM SERPL-MCNC: 2.4 MG/DL (ref 1.6–2.6)
MAGNESIUM SERPL-MCNC: 2.6 MG/DL (ref 1.6–2.6)
MCH RBC QN AUTO: 30.9 PG (ref 26.6–33)
MCH RBC QN AUTO: 31.9 PG (ref 26.6–33)
MCH RBC QN AUTO: 32.3 PG (ref 26.6–33)
MCHC RBC AUTO-ENTMCNC: 33 G/DL (ref 31.5–35.7)
MCHC RBC AUTO-ENTMCNC: 33.2 G/DL (ref 31.5–35.7)
MCHC RBC AUTO-ENTMCNC: 33.7 G/DL (ref 31.5–35.7)
MCV RBC AUTO: 93.9 FL (ref 79–97)
MCV RBC AUTO: 95.9 FL (ref 79–97)
MCV RBC AUTO: 96 FL (ref 79–97)
METAMYELOCYTES NFR BLD MANUAL: 3 % (ref 0–0)
MODALITY: ABNORMAL
MONOCYTES # BLD AUTO: 0.1 10*3/MM3 (ref 0.1–0.9)
MONOCYTES # BLD: 0.33 10*3/MM3 (ref 0.1–0.9)
MONOCYTES # BLD: 0.58 10*3/MM3 (ref 0.1–0.9)
MONOCYTES NFR BLD AUTO: 0.4 % (ref 5–12)
MYELOCYTES NFR BLD MANUAL: 1 % (ref 0–0)
NEUTROPHILS # BLD AUTO: 28.03 10*3/MM3 (ref 1.7–7)
NEUTROPHILS # BLD AUTO: 30.54 10*3/MM3 (ref 1.7–7)
NEUTROPHILS NFR BLD AUTO: 33.4 10*3/MM3 (ref 1.7–7)
NEUTROPHILS NFR BLD AUTO: 95.7 % (ref 42.7–76)
NEUTROPHILS NFR BLD MANUAL: 80 % (ref 42.7–76)
NEUTROPHILS NFR BLD MANUAL: 84 % (ref 42.7–76)
NEUTS BAND NFR BLD MANUAL: 12 % (ref 0–5)
NEUTS BAND NFR BLD MANUAL: 12 % (ref 0–5)
NITRITE UR QL STRIP: NEGATIVE
NRBC BLD AUTO-RTO: 0.2 /100 WBC (ref 0–0.2)
NRBC SPEC MANUAL: 5 /100 WBC (ref 0–0.2)
PAW @ PEAK INSP FLOW SETTING VENT: 22 CMH2O
PAW @ PEAK INSP FLOW SETTING VENT: 24 CMH2O
PAW @ PEAK INSP FLOW SETTING VENT: 25 CMH2O
PCO2 BLDA: 43.1 MM HG (ref 35–48)
PCO2 BLDA: 43.6 MM HG (ref 35–48)
PCO2 BLDA: 44.3 MM HG (ref 35–48)
PEEP RESPIRATORY: 5 CM[H2O]
PEEP RESPIRATORY: 7 CM[H2O]
PEEP RESPIRATORY: 7 CM[H2O]
PH BLDA: 7.29 PH UNITS (ref 7.35–7.45)
PH BLDA: 7.34 PH UNITS (ref 7.35–7.45)
PH BLDA: 7.36 PH UNITS (ref 7.35–7.45)
PH UR STRIP.AUTO: 6 [PH] (ref 5–8)
PHOSPHATE SERPL-MCNC: 1.9 MG/DL (ref 2.5–4.5)
PHOSPHATE SERPL-MCNC: 1.9 MG/DL (ref 2.5–4.5)
PHOSPHATE SERPL-MCNC: 2 MG/DL (ref 2.5–4.5)
PHOSPHATE SERPL-MCNC: 2.3 MG/DL (ref 2.5–4.5)
PHOSPHATE SERPL-MCNC: 3.2 MG/DL (ref 2.5–4.5)
PLAT MORPH BLD: NORMAL
PLATELET # BLD AUTO: 129 10*3/MM3 (ref 140–450)
PLATELET # BLD AUTO: 134 10*3/MM3 (ref 140–450)
PLATELET # BLD AUTO: 147 10*3/MM3 (ref 140–450)
PMV BLD AUTO: 8.7 FL (ref 6–12)
PMV BLD AUTO: 9 FL (ref 6–12)
PMV BLD AUTO: 9.3 FL (ref 6–12)
PO2 BLDA: 412.3 MM HG (ref 83–108)
PO2 BLDA: 413.4 MM HG (ref 83–108)
PO2 BLDA: 78.9 MM HG (ref 83–108)
POTASSIUM SERPL-SCNC: 6.8 MMOL/L (ref 3.5–5.2)
POTASSIUM SERPL-SCNC: 7 MMOL/L (ref 3.5–5.2)
POTASSIUM SERPL-SCNC: 7 MMOL/L (ref 3.5–5.2)
POTASSIUM SERPL-SCNC: 7.1 MMOL/L (ref 3.5–5.2)
POTASSIUM SERPL-SCNC: 7.3 MMOL/L (ref 3.5–5.2)
PROT SERPL-MCNC: 5.7 G/DL (ref 6–8.5)
PROT SERPL-MCNC: 6 G/DL (ref 6–8.5)
PROT SERPL-MCNC: 6.2 G/DL (ref 6–8.5)
PROT UR QL STRIP: ABNORMAL
PROTHROMBIN TIME: 12.8 SECONDS (ref 9.6–11.7)
PROTHROMBIN TIME: 12.9 SECONDS (ref 9.6–11.7)
PROTHROMBIN TIME: 12.9 SECONDS (ref 9.6–11.7)
PROTHROMBIN TIME: 13 SECONDS (ref 9.6–11.7)
PROTHROMBIN TIME: 13.2 SECONDS (ref 9.6–11.7)
RBC # BLD AUTO: 1.89 10*6/MM3 (ref 4.14–5.8)
RBC # BLD AUTO: 2.15 10*6/MM3 (ref 4.14–5.8)
RBC # BLD AUTO: 2.58 10*6/MM3 (ref 4.14–5.8)
RBC # UR STRIP: ABNORMAL /HPF
RBC MORPH BLD: NORMAL
REF LAB TEST METHOD: ABNORMAL
RESPIRATORY RATE: 30
RESPIRATORY RATE: 30
SAO2 % BLDCOA: 100 % (ref 94–98)
SAO2 % BLDCOA: 100 % (ref 94–98)
SAO2 % BLDCOA: 94.7 % (ref 94–98)
SARS-COV-2 RNA RESP QL NAA+PROBE: NOT DETECTED
SCAN SLIDE: NORMAL
SCAN SLIDE: NORMAL
SODIUM SERPL-SCNC: 138 MMOL/L (ref 136–145)
SODIUM SERPL-SCNC: 141 MMOL/L (ref 136–145)
SODIUM SERPL-SCNC: 143 MMOL/L (ref 136–145)
SP GR UR STRIP: 1.02 (ref 1–1.03)
SPERM URNS QL MICRO: ABNORMAL /HPF
SQUAMOUS #/AREA URNS HPF: ABNORMAL /HPF
TROPONIN T DELTA: 4 NG/L
TROPONIN T SERPL HS-MCNC: 118 NG/L
UROBILINOGEN UR QL STRIP: ABNORMAL
VARIANT LYMPHS NFR BLD MANUAL: 1 % (ref 0–5)
VARIANT LYMPHS NFR BLD MANUAL: 1 % (ref 19.6–45.3)
VARIANT LYMPHS NFR BLD MANUAL: 2 % (ref 19.6–45.3)
VENTILATOR MODE: ABNORMAL
WBC # UR STRIP: ABNORMAL /HPF
WBC MORPH BLD: NORMAL
WBC NRBC COR # BLD: 29.2 10*3/MM3 (ref 3.4–10.8)
WBC NRBC COR # BLD: 33.2 10*3/MM3 (ref 3.4–10.8)
WBC NRBC COR # BLD: 35 10*3/MM3 (ref 3.4–10.8)

## 2023-11-13 PROCEDURE — 82977 ASSAY OF GGT: CPT

## 2023-11-13 PROCEDURE — 85025 COMPLETE CBC W/AUTO DIFF WBC: CPT

## 2023-11-13 PROCEDURE — 84100 ASSAY OF PHOSPHORUS: CPT

## 2023-11-13 PROCEDURE — 63710000001 INSULIN REGULAR HUMAN PER 5 UNITS: Performed by: NURSE ANESTHETIST, CERTIFIED REGISTERED

## 2023-11-13 PROCEDURE — 25010000002 FENTANYL CITRATE (PF) 100 MCG/2ML SOLUTION: Performed by: NURSE ANESTHETIST, CERTIFIED REGISTERED

## 2023-11-13 PROCEDURE — 25010000002 HEPARIN (PORCINE) PER 1000 UNITS: Performed by: NURSE ANESTHETIST, CERTIFIED REGISTERED

## 2023-11-13 PROCEDURE — 82330 ASSAY OF CALCIUM: CPT | Performed by: INTERNAL MEDICINE

## 2023-11-13 PROCEDURE — 82330 ASSAY OF CALCIUM: CPT

## 2023-11-13 PROCEDURE — 63710000001 INSULIN LISPRO (HUMAN) PER 5 UNITS

## 2023-11-13 PROCEDURE — 80053 COMPREHEN METABOLIC PANEL: CPT

## 2023-11-13 PROCEDURE — 82248 BILIRUBIN DIRECT: CPT

## 2023-11-13 PROCEDURE — 94799 UNLISTED PULMONARY SVC/PX: CPT

## 2023-11-13 PROCEDURE — 83735 ASSAY OF MAGNESIUM: CPT | Performed by: INTERNAL MEDICINE

## 2023-11-13 PROCEDURE — 85730 THROMBOPLASTIN TIME PARTIAL: CPT

## 2023-11-13 PROCEDURE — 81001 URINALYSIS AUTO W/SCOPE: CPT

## 2023-11-13 PROCEDURE — 82150 ASSAY OF AMYLASE: CPT

## 2023-11-13 PROCEDURE — P9047 ALBUMIN (HUMAN), 25%, 50ML: HCPCS

## 2023-11-13 PROCEDURE — 25010000002 MIDAZOLAM PER 1 MG: Performed by: NURSE ANESTHETIST, CERTIFIED REGISTERED

## 2023-11-13 PROCEDURE — 84484 ASSAY OF TROPONIN QUANT: CPT

## 2023-11-13 PROCEDURE — 85610 PROTHROMBIN TIME: CPT

## 2023-11-13 PROCEDURE — 83735 ASSAY OF MAGNESIUM: CPT

## 2023-11-13 PROCEDURE — 94003 VENT MGMT INPAT SUBQ DAY: CPT

## 2023-11-13 PROCEDURE — 25010000002 POTASSIUM CHLORIDE PER 2 MEQ OF POTASSIUM

## 2023-11-13 PROCEDURE — 25010000002 MAGNESIUM SULFATE PER 500 MG OF MAGNESIUM

## 2023-11-13 PROCEDURE — 87635 SARS-COV-2 COVID-19 AMP PRB: CPT

## 2023-11-13 PROCEDURE — 25010000002 ALBUMIN HUMAN 5% PER 50 ML: Performed by: NURSE ANESTHETIST, CERTIFIED REGISTERED

## 2023-11-13 PROCEDURE — 82803 BLOOD GASES ANY COMBINATION: CPT

## 2023-11-13 PROCEDURE — 25010000002 CEFEPIME PER 500 MG

## 2023-11-13 PROCEDURE — P9041 ALBUMIN (HUMAN),5%, 50ML: HCPCS | Performed by: NURSE ANESTHETIST, CERTIFIED REGISTERED

## 2023-11-13 PROCEDURE — 25010000002 CALCIUM GLUCONATE-NACL 1-0.675 GM/50ML-% SOLUTION: Performed by: INTERNAL MEDICINE

## 2023-11-13 PROCEDURE — 85007 BL SMEAR W/DIFF WBC COUNT: CPT

## 2023-11-13 PROCEDURE — 83690 ASSAY OF LIPASE: CPT

## 2023-11-13 PROCEDURE — 25010000002 LINEZOLID 600 MG/300ML SOLUTION

## 2023-11-13 PROCEDURE — 82948 REAGENT STRIP/BLOOD GLUCOSE: CPT

## 2023-11-13 PROCEDURE — 25010000002 ALBUMIN HUMAN 25% PER 50 ML

## 2023-11-13 PROCEDURE — 25810000003 SODIUM CHLORIDE 0.9 % SOLUTION: Performed by: NURSE ANESTHETIST, CERTIFIED REGISTERED

## 2023-11-13 PROCEDURE — 25010000002 HEPARIN LOCK FLUSH PER 10 UNITS

## 2023-11-13 RX ORDER — DEXTROSE MONOHYDRATE 25 G/50ML
25 INJECTION, SOLUTION INTRAVENOUS
Status: ACTIVE | OUTPATIENT
Start: 2023-11-13

## 2023-11-13 RX ORDER — CALCIUM GLUCONATE 20 MG/ML
1000 INJECTION, SOLUTION INTRAVENOUS ONCE
Status: COMPLETED | OUTPATIENT
Start: 2023-11-13 | End: 2023-11-13

## 2023-11-13 RX ORDER — INSULIN LISPRO 100 [IU]/ML
2-7 INJECTION, SOLUTION INTRAVENOUS; SUBCUTANEOUS EVERY 6 HOURS SCHEDULED
Status: DISPENSED | OUTPATIENT
Start: 2023-11-13

## 2023-11-13 RX ORDER — ROCURONIUM BROMIDE 10 MG/ML
INJECTION, SOLUTION INTRAVENOUS AS NEEDED
Status: DISCONTINUED | OUTPATIENT
Start: 2023-11-13 | End: 2023-11-13 | Stop reason: SURG

## 2023-11-13 RX ORDER — IBUPROFEN 600 MG/1
1 TABLET ORAL
Status: ACTIVE | OUTPATIENT
Start: 2023-11-13

## 2023-11-13 RX ORDER — ALBUMIN (HUMAN) 12.5 G/50ML
25 SOLUTION INTRAVENOUS ONCE
Status: COMPLETED | OUTPATIENT
Start: 2023-11-13 | End: 2023-11-13

## 2023-11-13 RX ORDER — ALBUMIN, HUMAN INJ 5% 5 %
SOLUTION INTRAVENOUS CONTINUOUS PRN
Status: DISCONTINUED | OUTPATIENT
Start: 2023-11-13 | End: 2023-11-13 | Stop reason: SURG

## 2023-11-13 RX ORDER — CALCIUM CHLORIDE 100 MG/ML
INJECTION INTRAVENOUS; INTRAVENTRICULAR AS NEEDED
Status: DISCONTINUED | OUTPATIENT
Start: 2023-11-13 | End: 2023-11-13 | Stop reason: SURG

## 2023-11-13 RX ORDER — FENTANYL CITRATE 50 UG/ML
INJECTION, SOLUTION INTRAMUSCULAR; INTRAVENOUS AS NEEDED
Status: DISCONTINUED | OUTPATIENT
Start: 2023-11-13 | End: 2023-11-13 | Stop reason: SURG

## 2023-11-13 RX ORDER — SODIUM CHLORIDE 9 MG/ML
INJECTION, SOLUTION INTRAVENOUS CONTINUOUS PRN
Status: DISCONTINUED | OUTPATIENT
Start: 2023-11-13 | End: 2023-11-13 | Stop reason: SURG

## 2023-11-13 RX ORDER — HEPARIN SODIUM (PORCINE) LOCK FLUSH IV SOLN 100 UNIT/ML 100 UNIT/ML
3 SOLUTION INTRAVENOUS DAILY PRN
Status: DISPENSED | OUTPATIENT
Start: 2023-11-13

## 2023-11-13 RX ORDER — NICOTINE POLACRILEX 4 MG
15 LOZENGE BUCCAL
Status: ACTIVE | OUTPATIENT
Start: 2023-11-13

## 2023-11-13 RX ORDER — IPRATROPIUM BROMIDE AND ALBUTEROL SULFATE 2.5; .5 MG/3ML; MG/3ML
3 SOLUTION RESPIRATORY (INHALATION) ONCE
Status: COMPLETED | OUTPATIENT
Start: 2023-11-13 | End: 2023-11-13

## 2023-11-13 RX ORDER — HEPARIN SODIUM 1000 [USP'U]/ML
INJECTION, SOLUTION INTRAVENOUS; SUBCUTANEOUS AS NEEDED
Status: DISCONTINUED | OUTPATIENT
Start: 2023-11-13 | End: 2023-11-13 | Stop reason: SURG

## 2023-11-13 RX ORDER — MIDAZOLAM HYDROCHLORIDE 1 MG/ML
INJECTION INTRAMUSCULAR; INTRAVENOUS AS NEEDED
Status: DISCONTINUED | OUTPATIENT
Start: 2023-11-13 | End: 2023-11-13 | Stop reason: SURG

## 2023-11-13 RX ADMIN — DEXTROSE MONOHYDRATE 50 ML/HR: 100 INJECTION, SOLUTION INTRAVENOUS at 02:58

## 2023-11-13 RX ADMIN — CALCIUM CHLORIDE, MAGNESIUM CHLORIDE, DEXTROSE MONOHYDRATE, LACTIC ACID, SODIUM CHLORIDE, SODIUM BICARBONATE AND POTASSIUM CHLORIDE 2000 ML/HR: 5.15; 2.03; 22; 5.4; 6.46; 3.09; .157 INJECTION INTRAVENOUS at 04:41

## 2023-11-13 RX ADMIN — CALCIUM CHLORIDE, MAGNESIUM CHLORIDE, DEXTROSE MONOHYDRATE, LACTIC ACID, SODIUM CHLORIDE, SODIUM BICARBONATE AND POTASSIUM CHLORIDE 2000 ML/HR: 5.15; 2.03; 22; 5.4; 6.46; 3.09; .157 INJECTION INTRAVENOUS at 14:12

## 2023-11-13 RX ADMIN — CEFEPIME 2000 MG: 2 INJECTION, POWDER, FOR SOLUTION INTRAVENOUS at 02:45

## 2023-11-13 RX ADMIN — ALBUMIN (HUMAN) 25 G: 0.25 INJECTION, SOLUTION INTRAVENOUS at 01:12

## 2023-11-13 RX ADMIN — Medication 300 UNITS: at 16:35

## 2023-11-13 RX ADMIN — CALCIUM CHLORIDE, MAGNESIUM CHLORIDE, DEXTROSE MONOHYDRATE, LACTIC ACID, SODIUM CHLORIDE, SODIUM BICARBONATE AND POTASSIUM CHLORIDE 2000 ML/HR: 5.15; 2.03; 22; 5.4; 6.46; 3.09; .157 INJECTION INTRAVENOUS at 06:44

## 2023-11-13 RX ADMIN — ROCURONIUM BROMIDE 50 MG: 10 INJECTION, SOLUTION INTRAVENOUS at 17:39

## 2023-11-13 RX ADMIN — MAGNESIUM SULFATE HEPTAHYDRATE 100 ML/HR: 500 INJECTION, SOLUTION INTRAMUSCULAR; INTRAVENOUS at 11:05

## 2023-11-13 RX ADMIN — MIDAZOLAM 2 MG: 1 INJECTION INTRAMUSCULAR; INTRAVENOUS at 17:38

## 2023-11-13 RX ADMIN — ALBUMIN (HUMAN): 12.5 INJECTION, SOLUTION INTRAVENOUS at 18:16

## 2023-11-13 RX ADMIN — INSULIN LISPRO 2 UNITS: 100 INJECTION, SOLUTION INTRAVENOUS; SUBCUTANEOUS at 01:12

## 2023-11-13 RX ADMIN — CALCIUM GLUCONATE 1000 MG: 20 INJECTION, SOLUTION INTRAVENOUS at 10:45

## 2023-11-13 RX ADMIN — FENTANYL CITRATE 25 MCG: 50 INJECTION, SOLUTION INTRAMUSCULAR; INTRAVENOUS at 18:04

## 2023-11-13 RX ADMIN — SODIUM CHLORIDE: 9 INJECTION, SOLUTION INTRAVENOUS at 17:29

## 2023-11-13 RX ADMIN — CALCIUM CHLORIDE, MAGNESIUM CHLORIDE, DEXTROSE MONOHYDRATE, LACTIC ACID, SODIUM CHLORIDE, SODIUM BICARBONATE AND POTASSIUM CHLORIDE 2000 ML/HR: 5.15; 2.03; 22; 5.4; 6.46; 3.09; .157 INJECTION INTRAVENOUS at 11:47

## 2023-11-13 RX ADMIN — IPRATROPIUM BROMIDE AND ALBUTEROL SULFATE 3 ML: .5; 3 SOLUTION RESPIRATORY (INHALATION) at 02:40

## 2023-11-13 RX ADMIN — FENTANYL CITRATE 25 MCG: 50 INJECTION, SOLUTION INTRAMUSCULAR; INTRAVENOUS at 17:39

## 2023-11-13 RX ADMIN — LINEZOLID 600 MG: 600 INJECTION, SOLUTION INTRAVENOUS at 14:50

## 2023-11-13 RX ADMIN — FENTANYL CITRATE 25 MCG: 50 INJECTION, SOLUTION INTRAMUSCULAR; INTRAVENOUS at 18:00

## 2023-11-13 RX ADMIN — Medication 300 UNITS: at 16:34

## 2023-11-13 RX ADMIN — SODIUM PHOSPHATE, MONOBASIC, MONOHYDRATE AND SODIUM PHOSPHATE, DIBASIC, ANHYDROUS 10 MMOL: 142; 276 INJECTION, SOLUTION INTRAVENOUS at 10:45

## 2023-11-13 RX ADMIN — HEPARIN SODIUM 30000 UNITS: 1000 INJECTION INTRAVENOUS; SUBCUTANEOUS at 19:01

## 2023-11-13 RX ADMIN — INSULIN LISPRO 2 UNITS: 100 INJECTION, SOLUTION INTRAVENOUS; SUBCUTANEOUS at 06:31

## 2023-11-13 RX ADMIN — LINEZOLID 600 MG: 600 INJECTION, SOLUTION INTRAVENOUS at 02:45

## 2023-11-13 RX ADMIN — INSULIN HUMAN 10 UNITS: 100 INJECTION, SOLUTION PARENTERAL at 17:41

## 2023-11-13 RX ADMIN — CALCIUM CHLORIDE 1 G: 100 INJECTION, SOLUTION INTRAVENOUS at 17:40

## 2023-11-13 RX ADMIN — CALCIUM CHLORIDE, MAGNESIUM CHLORIDE, DEXTROSE MONOHYDRATE, LACTIC ACID, SODIUM CHLORIDE, SODIUM BICARBONATE AND POTASSIUM CHLORIDE 2000 ML/HR: 5.15; 2.03; 22; 5.4; 6.46; 3.09; .157 INJECTION INTRAVENOUS at 11:48

## 2023-11-13 RX ADMIN — CEFEPIME 2000 MG: 2 INJECTION, POWDER, FOR SOLUTION INTRAVENOUS at 10:45

## 2023-11-13 RX ADMIN — CALCIUM CHLORIDE, MAGNESIUM CHLORIDE, DEXTROSE MONOHYDRATE, LACTIC ACID, SODIUM CHLORIDE, SODIUM BICARBONATE AND POTASSIUM CHLORIDE 2000 ML/HR: 5.15; 2.03; 22; 5.4; 6.46; 3.09; .157 INJECTION INTRAVENOUS at 04:42

## 2023-11-13 RX ADMIN — FENTANYL CITRATE 25 MCG: 50 INJECTION, SOLUTION INTRAMUSCULAR; INTRAVENOUS at 17:34

## 2023-11-13 NOTE — ANESTHESIA PREPROCEDURE EVALUATION
Anesthesia Evaluation     Patient summary reviewed and Nursing notes reviewed   NPO Solid Status: > 8 hours  NPO Liquid Status: > 8 hours           Airway   Mallampati: II  TM distance: >3 FB  Neck ROM: full  No difficulty expected  Dental - normal exam     Pulmonary - normal exam    breath sounds clear to auscultation  (+) a smoker Current Abstained day of surgery,  Cardiovascular - negative cardio ROS and normal exam  Exercise tolerance: unable to assess    ECG reviewed  Rhythm: regular  Rate: normal        Neuro/Psych  (+) CVA residual symptoms  GI/Hepatic/Renal/Endo    (+) renal disease- ARF    Musculoskeletal (-) negative ROS    Abdominal  - normal exam   Substance History   (+) alcohol use, drug use     OB/GYN negative ob/gyn ROS         Other   blood dyscrasia anemia,     ROS/Med Hx Other: History of Present Illness  51 yo male transferred from [facility] s/p full arrest.  Patient reportedly was smoking THC and collapsed. Per EMS, this was related by a young female on the scene.  No family is available.  Patient has never been to this facility before.  BLS crew responded to scene.  No shock was advised, patient was in asystole at presentation.  Patient was intubated by me after arrival at 2305, ACLS followed, ROSC at 2311.  Patient then lost pulse with Vtach at 2313.  ROSC again 2315.  Patient then became bradycardic and lost pulse again 2335.  After one cycle of CPR, ROSC again.            Medical Decision Making  Resuscitated arrest stable for the time being stable on pressors.  Etiology unclear.  Differential would include arrhythmia versus MI versus drug overdose.  Initial toxicology is negative.  EKG without STEMI.     Case discussed with sister, Kristine, who stated she was with her brother tonight who had been drinking alcohol and smoking THC.  Kristine tested the THC after her brother was transferred to the hospital and it was positive for fentanyl.     Critical Care Time 45 minutes.      Problems Addressed:  Anoxic brain injury: acute illness or injury  History of sudden cardiac arrest successfully resuscitated: acute illness or injury                  Anesthesia Plan    ASA 6     general     (Organ Donor, Anoxic Brain Injury, K 7.0, EF OK, Drips : Epi 0.1, Nor Epi 0.02, Sandro, Hgb 6.8)      Plan discussed with CRNA.      CODE STATUS:    Code Status (Patient has no pulse and is not breathing): No CPR (Do Not Attempt to Resuscitate)  Medical Interventions (Patient has pulse or is breathing): Comfort Measures

## 2023-11-13 NOTE — PROGRESS NOTES
Nephrology  Progress Note                                        [facility]  G[facility] patient  Patient Identification    Name: Yusuf Jin  Age: 52 y.o.  Sex: male  :  1970  MRN: [id number]      DATE OF SERVICE:  2023        Subective    On the vent unresponsive  On Levophed  GUIDO  patient     Objective   Scheduled Meds:cefepime, 2,000 mg, Intravenous, Q8H  heparin (porcine), 1,000 Units, Intravenous, Once  insulin lispro, 2-7 Units, Subcutaneous, Q6H  Linezolid, 600 mg, Intravenous, Q12H  Naloxone HCl, 10 mg, Intravenous, Once          Continuous Infusions:cardioplegic solution HIGH ([facility] Tao), 100 mL/hr, Last Rate: 100 mL/hr (23)  dextrose, 50 mL/hr, Last Rate: 50 mL/hr (23)  heparin (porcine), 500 Units/hr, Last Rate: 500 Units/hr (23)  Levothyroxine Sodium 200 mcg in dextrose (D5W) 5 % 250 mL IVPB, 10 mcg/hr, Last Rate: 10 mcg/hr (23)  norepinephrine, 0.02-0.5 mcg/kg/min, Last Rate: 0.05 mcg/kg/min (23)  Phoxillum BK4/2.5, 2,000 mL/hr  Phoxillum BK4/2.5, 2,000 mL/hr  Phoxillum BK4/2.5, 2,000 mL/hr  PrismaSol BGK 2/3.5, 2,000 mL/hr, Last Rate: 2,000 mL/hr (23)  PrismaSol BGK 2/3.5, 2,000 mL/hr, Last Rate: 2,000 mL/hr (23)  PrismaSol BGK 2/3.5, 2,000 mL/hr, Last Rate: 2,000 mL/hr (23)  vasopressin, 0.05 Units/hr, Last Rate: Stopped (23)        PRN Meds:  Calcium Replacement - Follow Nurse / BPA Driven Protocol    dextrose    dextrose    dextrose    glucagon (human recombinant)    Magnesium Standard Dose Replacement - Follow Nurse / BPA Driven Protocol    Phosphorus Replacement - Follow Nurse / BPA Driven Protocol    Potassium Replacement - Follow Nurse / BPA Driven Protocol     Exam:  /82   Pulse 117   Temp 98.2 °F (36.8 °C) (Rectal)   Resp  "(!) 30   Ht 175.3 cm (69\")   Wt 91.6 kg (202 lb)   SpO2 99%   BMI 29.83 kg/m²     Intake/Output last 3 shifts:  I/O last 3 completed shifts:  In: 3052 [I.V.:3052]  Out: 916 [Emesis/NG output:599]    Intake/Output this shift:  I/O this shift:  In: 3462 [I.V.:3462]  Out: 2502 [Urine:15; Emesis/NG output:65; Stool:550]    Physical exam:  General Appearance: Trach vent presors    Head:  Normocephalic, without obvious abnormality, atraumatic  Eyes: conjunctiva/corneas clear     Neck:  Supple,  no adenopathy;      Lungs: Tunneled dialysis catheter noted decreased BS occasion ronchi  Heart:  Regular rate and rhythm, S1 and S2 normal  Abdomen: PEG tube noted soft, non-tender, bowel sounds active   Extremities: trace edema  Pulses: 2+ and symmetric all extremities  Skin:  No rashes or lesions       Data Review:  All labs (24hrs):   Recent Results (from the past 24 hour(s))   Comprehensive Metabolic Panel    Collection Time: 11/12/23  6:13 AM    Specimen: Blood   Result Value Ref Range    Glucose 26 (C) 65 - 99 mg/dL    BUN 53 (H) 6 - 20 mg/dL    Creatinine 8.17 (H) 0.76 - 1.27 mg/dL    Sodium 138 136 - 145 mmol/L    Potassium 5.0 3.5 - 5.2 mmol/L    Chloride 107 98 - 107 mmol/L    CO2 21.0 (L) 22.0 - 29.0 mmol/L    Calcium 8.7 8.6 - 10.5 mg/dL    Total Protein 5.2 (L) 6.0 - 8.5 g/dL    Albumin 2.2 (L) 3.5 - 5.2 g/dL    ALT (SGPT) 56 (H) 1 - 41 U/L    AST (SGOT) 55 (H) 1 - 40 U/L    Alkaline Phosphatase 134 (H) 39 - 117 U/L    Total Bilirubin 0.4 0.0 - 1.2 mg/dL    Globulin 3.0 gm/dL    A/G Ratio 0.7 g/dL    BUN/Creatinine Ratio 6.5 (L) 7.0 - 25.0    Anion Gap 10.0 5.0 - 15.0 mmol/L    eGFR 7.2 (L) >60.0 mL/min/1.73   Magnesium    Collection Time: 11/12/23  6:13 AM    Specimen: Blood   Result Value Ref Range    Magnesium 2.3 1.6 - 2.6 mg/dL   Phosphorus    Collection Time: 11/12/23  6:13 AM    Specimen: Blood   Result Value Ref Range    Phosphorus 3.1 2.5 - 4.5 mg/dL   Calcium, Ionized    Collection Time: 11/12/23  6:13 " AM    Specimen: Blood   Result Value Ref Range    Ionized Calcium 1.23 1.20 - 1.30 mmol/L   Amylase    Collection Time: 11/12/23  6:13 AM    Specimen: Blood   Result Value Ref Range    Amylase 127 (H) 28 - 100 U/L   Lipase    Collection Time: 11/12/23  6:13 AM    Specimen: Blood   Result Value Ref Range    Lipase 132 (H) 13 - 60 U/L   Protime-INR    Collection Time: 11/12/23  6:13 AM    Specimen: Blood   Result Value Ref Range    Protime 12.8 (H) 9.6 - 11.7 Seconds    INR 1.19 (H) 0.93 - 1.10   aPTT    Collection Time: 11/12/23  6:13 AM    Specimen: Blood   Result Value Ref Range    PTT 35.0 (H) 24.0 - 31.0 seconds   Gamma GT    Collection Time: 11/12/23  6:13 AM    Specimen: Blood   Result Value Ref Range    GGT 79 (H) 8 - 61 U/L   CBC Auto Differential    Collection Time: 11/12/23  6:13 AM    Specimen: Blood   Result Value Ref Range    WBC 15.90 (H) 3.40 - 10.80 10*3/mm3    RBC 2.60 (L) 4.14 - 5.80 10*6/mm3    Hemoglobin 8.5 (L) 13.0 - 17.7 g/dL    Hematocrit 24.3 (L) 37.5 - 51.0 %    MCV 93.4 79.0 - 97.0 fL    MCH 32.5 26.6 - 33.0 pg    MCHC 34.8 31.5 - 35.7 g/dL    RDW 13.5 12.3 - 15.4 %    RDW-SD 46.4 37.0 - 54.0 fl    MPV 9.5 6.0 - 12.0 fL    Platelets 120 (L) 140 - 450 10*3/mm3   Scan Slide    Collection Time: 11/12/23  6:13 AM    Specimen: Blood   Result Value Ref Range    Scan Slide     Manual Differential    Collection Time: 11/12/23  6:13 AM    Specimen: Blood   Result Value Ref Range    Neutrophil % 77.0 (H) 42.7 - 76.0 %    Lymphocyte % 4.0 (L) 19.6 - 45.3 %    Monocyte % 1.0 (L) 5.0 - 12.0 %    Bands %  14.0 (H) 0.0 - 5.0 %    Metamyelocyte % 3.0 (H) 0.0 - 0.0 %    Myelocyte % 1.0 (H) 0.0 - 0.0 %    Neutrophils Absolute 14.47 (H) 1.70 - 7.00 10*3/mm3    Lymphocytes Absolute 0.64 (L) 0.70 - 3.10 10*3/mm3    Monocytes Absolute 0.16 0.10 - 0.90 10*3/mm3    RBC Morphology Normal Normal    Toxic Granulation Slight/1+ None Seen    Vacuolated Neutrophils Slight/1+ None Seen    Platelet Estimate Decreased  Normal   POC Glucose Once    Collection Time: 11/12/23  6:39 AM    Specimen: Blood   Result Value Ref Range    Glucose 17 (C) 70 - 105 mg/dL   POC Glucose Once    Collection Time: 11/12/23  7:04 AM    Specimen: Blood   Result Value Ref Range    Glucose 22 (C) 70 - 105 mg/dL   POC Glucose Once    Collection Time: 11/12/23  7:45 AM    Specimen: Blood   Result Value Ref Range    Glucose 59 (L) 70 - 105 mg/dL   POC Glucose Once    Collection Time: 11/12/23  8:53 AM    Specimen: Blood   Result Value Ref Range    Glucose 33 (C) 70 - 105 mg/dL   Adult Transthoracic Echo Complete W/ Cont if Necessary Per Protocol    Collection Time: 11/12/23 10:01 AM   Result Value Ref Range    LVIDd 5.0 cm    LVIDs 3.5 cm    IVSd 1.00 cm    LVPWd 0.90 cm    FS 30.0 %    IVS/LVPW 1.11 cm    ESV(cubed) 42.9 ml    LV Sys Vol (BSA corrected) 25.9 cm2    EDV(cubed) 125.0 ml    LV Orellana Vol (BSA corrected) 64.1 cm2    LV mass(C)d 169.9 grams    LVOT area 3.8 cm2    LVOT diam 2.20 cm    EDV(MOD-sp4) 133.0 ml    ESV(MOD-sp4) 53.8 ml    SV(MOD-sp4) 79.2 ml    SI(MOD-sp4) 38.2 ml/m2    EF(MOD-sp4) 59.5 %    MV E max jc 91.2 cm/sec    MV A max jc 80.7 cm/sec    MV dec time 0.13 sec    MV E/A 1.13     LA ESV Index (BP) 26.6 ml/m2    Med Peak E' Jc 9.8 cm/sec    Lat Peak E' Jc 16.6 cm/sec    Avg E/e' ratio 6.91     SV(LVOT) 65.0 ml    RVIDd 3.5 cm    RV Base 4.4 cm    RV Mid 3.1 cm    RV Length 7.5 cm    TAPSE (>1.6) 3.1 cm    RV S' 18.1 cm/sec    LA dimension (2D)  3.4 cm    LV V1 max 99.0 cm/sec    LV V1 max PG 3.9 mmHg    LV V1 mean PG 2.00 mmHg    LV V1 VTI 17.1 cm    Ao pk jc 113.0 cm/sec    Ao max PG 5.1 mmHg    Ao mean PG 3.0 mmHg    Ao V2 VTI 19.7 cm    LISBETH(I,D) 3.3 cm2    MV max PG 4.6 mmHg    MV mean PG 2.00 mmHg    MV V2 VTI 20.2 cm    MV P1/2t 44.5 msec    MVA(P1/2t) 4.9 cm2    MVA(VTI) 3.2 cm2    MV dec slope 703.5 cm/sec2    TR max jc 222.0 cm/sec    TR max PG 19.7 mmHg    RV V1 max PG 0.95 mmHg    RV V1 max 48.7 cm/sec    RV V1 VTI  8.1 cm    PA V2 max 92.7 cm/sec    Sinus 3.9 cm   POC Glucose Once    Collection Time: 11/12/23 10:07 AM    Specimen: Blood   Result Value Ref Range    Glucose 83 70 - 105 mg/dL   POC Glucose Once    Collection Time: 11/12/23 11:37 AM    Specimen: Blood   Result Value Ref Range    Glucose 98 70 - 105 mg/dL   Blood Gas, Arterial -    Collection Time: 11/12/23 12:09 PM    Specimen: Arterial Blood   Result Value Ref Range    Site Arterial Line     Glenroy's Test N/A     pH, Arterial 7.319 (L) 7.350 - 7.450 pH units    pCO2, Arterial 37.2 35.0 - 48.0 mm Hg    pO2, Arterial 92.6 83.0 - 108.0 mm Hg    HCO3, Arterial 19.1 (L) 21.0 - 28.0 mmol/L    Base Excess, Arterial -6.4 (L) 0.0 - 3.0 mmol/L    O2 Saturation, Arterial 96.5 94.0 - 98.0 %    CO2 Content 20.3 (L) 22 - 29 mmol/L    Barometric Pressure for Blood Gas      Modality Adult Vent     FIO2 40 %    Ventilator Mode PC     PEEP 5     PIP 25 cmH2O    Hemodilution No     Respiratory Rate 30    Blood Gas, Arterial -    Collection Time: 11/12/23 12:49 PM    Specimen: Arterial Blood   Result Value Ref Range    Site Arterial Line     Glenroy's Test N/A     pH, Arterial 7.310 (L) 7.350 - 7.450 pH units    pCO2, Arterial 40.8 35.0 - 48.0 mm Hg    pO2, Arterial 554.4 (H) 83.0 - 108.0 mm Hg    HCO3, Arterial 20.5 (L) 21.0 - 28.0 mmol/L    Base Excess, Arterial -5.3 (L) 0.0 - 3.0 mmol/L    O2 Saturation, Arterial 100.0 (H) 94.0 - 98.0 %    CO2 Content 21.8 (L) 22 - 29 mmol/L    Barometric Pressure for Blood Gas      Modality Adult Vent     FIO2 100 %    Ventilator Mode PC     PEEP 5     PIP 25 cmH2O    Hemodilution No     Respiratory Rate 30    POC Glucose Once    Collection Time: 11/12/23  2:03 PM    Specimen: Blood   Result Value Ref Range    Glucose 126 (H) 70 - 105 mg/dL   Comprehensive Metabolic Panel    Collection Time: 11/12/23  4:02 PM    Specimen: Blood   Result Value Ref Range    Glucose 164 (H) 65 - 99 mg/dL    BUN 49 (H) 6 - 20 mg/dL    Creatinine 6.82 (H) 0.76 - 1.27  mg/dL    Sodium 136 136 - 145 mmol/L    Potassium 7.8 (C) 3.5 - 5.2 mmol/L    Chloride 107 98 - 107 mmol/L    CO2 19.0 (L) 22.0 - 29.0 mmol/L    Calcium 8.9 8.6 - 10.5 mg/dL    Total Protein 5.9 (L) 6.0 - 8.5 g/dL    Albumin 2.5 (L) 3.5 - 5.2 g/dL    ALT (SGPT) 60 (H) 1 - 41 U/L    AST (SGOT) 57 (H) 1 - 40 U/L    Alkaline Phosphatase 179 (H) 39 - 117 U/L    Total Bilirubin 0.3 0.0 - 1.2 mg/dL    Globulin 3.4 gm/dL    A/G Ratio 0.7 g/dL    BUN/Creatinine Ratio 7.2 7.0 - 25.0    Anion Gap 10.0 5.0 - 15.0 mmol/L    eGFR 9.0 (L) >60.0 mL/min/1.73   Phosphorus    Collection Time: 11/12/23  4:02 PM    Specimen: Blood   Result Value Ref Range    Phosphorus 3.5 2.5 - 4.5 mg/dL   Amylase    Collection Time: 11/12/23  4:02 PM    Specimen: Blood   Result Value Ref Range    Amylase 124 (H) 28 - 100 U/L   Lipase    Collection Time: 11/12/23  4:02 PM    Specimen: Blood   Result Value Ref Range    Lipase 95 (H) 13 - 60 U/L   Protime-INR    Collection Time: 11/12/23  4:02 PM    Specimen: Blood   Result Value Ref Range    Protime 12.9 (H) 9.6 - 11.7 Seconds    INR 1.20 (H) 0.93 - 1.10   aPTT    Collection Time: 11/12/23  4:02 PM    Specimen: Blood   Result Value Ref Range    PTT 36.7 (H) 24.0 - 31.0 seconds   CBC Auto Differential    Collection Time: 11/12/23  4:02 PM    Specimen: Blood   Result Value Ref Range    WBC 24.90 (H) 3.40 - 10.80 10*3/mm3    RBC 2.79 (L) 4.14 - 5.80 10*6/mm3    Hemoglobin 9.0 (L) 13.0 - 17.7 g/dL    Hematocrit 26.8 (L) 37.5 - 51.0 %    MCV 96.0 79.0 - 97.0 fL    MCH 32.1 26.6 - 33.0 pg    MCHC 33.5 31.5 - 35.7 g/dL    RDW 13.8 12.3 - 15.4 %    RDW-SD 45.9 37.0 - 54.0 fl    MPV 9.2 6.0 - 12.0 fL    Platelets 147 140 - 450 10*3/mm3   Magnesium    Collection Time: 11/12/23  4:02 PM    Specimen: Blood   Result Value Ref Range    Magnesium 2.5 1.6 - 2.6 mg/dL   Calcium, Ionized    Collection Time: 11/12/23  4:02 PM    Specimen: Blood   Result Value Ref Range    Ionized Calcium 1.19 (L) 1.20 - 1.30 mmol/L    Bilirubin, Direct    Collection Time: 11/12/23  4:02 PM    Specimen: Blood   Result Value Ref Range    Bilirubin, Direct 0.2 0.0 - 0.3 mg/dL   D-dimer, Quantitative    Collection Time: 11/12/23  4:02 PM    Specimen: Blood   Result Value Ref Range    D-Dimer, Quantitative 6.07 (H) 0.00 - 0.53 mg/L (FEU)   Fibrinogen    Collection Time: 11/12/23  4:02 PM    Specimen: Blood   Result Value Ref Range    Fibrinogen >860 (H) 210 - 450 mg/dL   Scan Slide    Collection Time: 11/12/23  4:02 PM    Specimen: Blood   Result Value Ref Range    Scan Slide     Manual Differential    Collection Time: 11/12/23  4:02 PM    Specimen: Blood   Result Value Ref Range    Neutrophil % 94.0 (H) 42.7 - 76.0 %    Lymphocyte % 2.0 (L) 19.6 - 45.3 %    Bands %  4.0 0.0 - 5.0 %    Neutrophils Absolute 24.40 (H) 1.70 - 7.00 10*3/mm3    Lymphocytes Absolute 0.50 (L) 0.70 - 3.10 10*3/mm3    nRBC 1.0 (H) 0.0 - 0.2 /100 WBC    RBC Morphology Normal Normal    Toxic Granulation Slight/1+ None Seen    Platelet Estimate Adequate Normal    Large Platelets Mod/2+ None Seen   Blood Gas, Arterial -    Collection Time: 11/12/23  4:20 PM    Specimen: Arterial Blood   Result Value Ref Range    Site Arterial Line     Glenroy's Test N/A     pH, Arterial 7.342 (L) 7.350 - 7.450 pH units    pCO2, Arterial 33.6 (L) 35.0 - 48.0 mm Hg    pO2, Arterial 71.1 (L) 83.0 - 108.0 mm Hg    HCO3, Arterial 18.2 (L) 21.0 - 28.0 mmol/L    Base Excess, Arterial -6.8 (L) 0.0 - 3.0 mmol/L    O2 Saturation, Arterial 93.3 (L) 94.0 - 98.0 %    CO2 Content 19.3 (L) 22 - 29 mmol/L    Barometric Pressure for Blood Gas      Modality Adult Vent     FIO2 40 %    Ventilator Mode PC     PEEP 5     PIP 25 cmH2O    Hemodilution No     Respiratory Rate 30    Blood Gas, Arterial -    Collection Time: 11/12/23  5:29 PM    Specimen: Arterial Blood   Result Value Ref Range    Site Arterial Line     Glenroy's Test N/A     pH, Arterial 7.374 7.350 - 7.450 pH units    pCO2, Arterial 38.3 35.0 - 48.0 mm Hg     pO2, Arterial 450.5 (H) 83.0 - 108.0 mm Hg    HCO3, Arterial 22.4 21.0 - 28.0 mmol/L    Base Excess, Arterial -2.6 (L) 0.0 - 3.0 mmol/L    O2 Saturation, Arterial 100.0 (H) 94.0 - 98.0 %    CO2 Content 23.6 22 - 29 mmol/L    Barometric Pressure for Blood Gas      Modality Adult Vent     FIO2 100 %    Ventilator Mode PC     PEEP 7     PIP 32 cmH2O    Hemodilution No     Respiratory Rate 30    POC Glucose Once    Collection Time: 11/12/23  6:09 PM    Specimen: Blood   Result Value Ref Range    Glucose 192 (H) 70 - 105 mg/dL   Blood Gas, Arterial -    Collection Time: 11/12/23  7:33 PM    Specimen: Arterial Blood   Result Value Ref Range    Site Arterial Line     Glenroy's Test N/A     pH, Arterial 7.285 (L) 7.350 - 7.450 pH units    pCO2, Arterial 40.1 35.0 - 48.0 mm Hg    pO2, Arterial 499.9 (H) 83.0 - 108.0 mm Hg    HCO3, Arterial 19.0 (L) 21.0 - 28.0 mmol/L    Base Excess, Arterial -7.2 (L) 0.0 - 3.0 mmol/L    O2 Saturation, Arterial 100.0 (H) 94.0 - 98.0 %    CO2 Content 20.3 (L) 22 - 29 mmol/L    Barometric Pressure for Blood Gas      Modality Adult Vent     FIO2 100 %    Ventilator Mode PC     PEEP 5     PIP 25 cmH2O    Hemodilution No     Respiratory Rate 30    Magnesium    Collection Time: 11/12/23  8:19 PM    Specimen: Blood   Result Value Ref Range    Magnesium 2.7 (H) 1.6 - 2.6 mg/dL   Calcium, Ionized    Collection Time: 11/12/23  8:19 PM    Specimen: Blood   Result Value Ref Range    Ionized Calcium 1.16 (L) 1.20 - 1.30 mmol/L   Comprehensive Metabolic Panel    Collection Time: 11/12/23  8:19 PM    Specimen: Blood   Result Value Ref Range    Glucose 348 (H) 65 - 99 mg/dL    BUN 50 (H) 6 - 20 mg/dL    Creatinine 7.20 (H) 0.76 - 1.27 mg/dL    Sodium 137 136 - 145 mmol/L    Potassium 7.9 (C) 3.5 - 5.2 mmol/L    Chloride 105 98 - 107 mmol/L    CO2 17.0 (L) 22.0 - 29.0 mmol/L    Calcium 8.5 (L) 8.6 - 10.5 mg/dL    Total Protein 5.0 (L) 6.0 - 8.5 g/dL    Albumin 2.0 (L) 3.5 - 5.2 g/dL    ALT (SGPT) 48 (H) 1 -  41 U/L    AST (SGOT) 42 (H) 1 - 40 U/L    Alkaline Phosphatase 148 (H) 39 - 117 U/L    Total Bilirubin 0.2 0.0 - 1.2 mg/dL    Globulin 3.0 gm/dL    A/G Ratio 0.7 g/dL    BUN/Creatinine Ratio 6.9 (L) 7.0 - 25.0    Anion Gap 15.0 5.0 - 15.0 mmol/L    eGFR 8.4 (L) >60.0 mL/min/1.73   Phosphorus    Collection Time: 11/12/23  8:19 PM    Specimen: Blood   Result Value Ref Range    Phosphorus 4.4 2.5 - 4.5 mg/dL   Amylase    Collection Time: 11/12/23  8:19 PM    Specimen: Blood   Result Value Ref Range    Amylase 100 28 - 100 U/L   Lipase    Collection Time: 11/12/23  8:19 PM    Specimen: Blood   Result Value Ref Range    Lipase 69 (H) 13 - 60 U/L   Bilirubin, Direct    Collection Time: 11/12/23  8:19 PM    Specimen: Blood   Result Value Ref Range    Bilirubin, Direct <0.2 0.0 - 0.3 mg/dL   CBC Auto Differential    Collection Time: 11/12/23  8:19 PM    Specimen: Blood   Result Value Ref Range    WBC 26.90 (H) 3.40 - 10.80 10*3/mm3    RBC 2.26 (L) 4.14 - 5.80 10*6/mm3    Hemoglobin 7.2 (L) 13.0 - 17.7 g/dL    Hematocrit 21.6 (L) 37.5 - 51.0 %    MCV 95.2 79.0 - 97.0 fL    MCH 31.8 26.6 - 33.0 pg    MCHC 33.4 31.5 - 35.7 g/dL    RDW 13.6 12.3 - 15.4 %    RDW-SD 47.3 37.0 - 54.0 fl    MPV 9.5 6.0 - 12.0 fL    Platelets 119 (L) 140 - 450 10*3/mm3   aPTT    Collection Time: 11/12/23  8:19 PM    Specimen: Blood   Result Value Ref Range    PTT 35.1 (H) 24.0 - 31.0 seconds   Protime-INR    Collection Time: 11/12/23  8:19 PM    Specimen: Blood   Result Value Ref Range    Protime 13.4 (H) 9.6 - 11.7 Seconds    INR 1.25 (H) 0.93 - 1.10   Scan Slide    Collection Time: 11/12/23  8:19 PM    Specimen: Blood   Result Value Ref Range    Scan Slide     Manual Differential    Collection Time: 11/12/23  8:19 PM    Specimen: Blood   Result Value Ref Range    Neutrophil % 91.0 (H) 42.7 - 76.0 %    Lymphocyte % 4.0 (L) 19.6 - 45.3 %    Bands %  5.0 0.0 - 5.0 %    Neutrophils Absolute 25.82 (H) 1.70 - 7.00 10*3/mm3    Lymphocytes Absolute 1.08  0.70 - 3.10 10*3/mm3    RBC Morphology Normal Normal    WBC Morphology Normal Normal    Platelet Estimate Decreased Normal    Large Platelets Slight/1+ None Seen   POC Glucose Once    Collection Time: 11/12/23  8:26 PM    Specimen: Blood   Result Value Ref Range    Glucose 292 (H) 70 - 105 mg/dL   aPTT    Collection Time: 11/12/23 10:04 PM    Specimen: Blood   Result Value Ref Range    PTT 47.9 (H) 24.0 - 31.0 seconds   Protime-INR    Collection Time: 11/12/23 10:04 PM    Specimen: Blood   Result Value Ref Range    Protime 13.1 (H) 9.6 - 11.7 Seconds    INR 1.22 (H) 0.93 - 1.10   POC Glucose Once    Collection Time: 11/12/23 10:10 PM    Specimen: Blood   Result Value Ref Range    Glucose 239 (H) 70 - 105 mg/dL   Blood Gas, Arterial -    Collection Time: 11/12/23 11:39 PM    Specimen: Arterial Blood   Result Value Ref Range    Site Arterial Line     Glenroy's Test N/A     pH, Arterial 7.318 (L) 7.350 - 7.450 pH units    pCO2, Arterial 39.9 35.0 - 48.0 mm Hg    pO2, Arterial 90.5 83.0 - 108.0 mm Hg    HCO3, Arterial 20.5 (L) 21.0 - 28.0 mmol/L    Base Excess, Arterial -5.3 (L) 0.0 - 3.0 mmol/L    O2 Saturation, Arterial 96.2 94.0 - 98.0 %    CO2 Content 21.7 (L) 22 - 29 mmol/L    Barometric Pressure for Blood Gas      Modality Adult Vent     FIO2 40 %    Ventilator Mode AC     PEEP 5     PIP 25 cmH2O    Hemodilution No     Respiratory Rate 30     Inspiratory Time 1    Comprehensive Metabolic Panel    Collection Time: 11/12/23 11:49 PM    Specimen: Blood   Result Value Ref Range    Glucose 260 (H) 65 - 99 mg/dL    BUN 38 (H) 6 - 20 mg/dL    Creatinine 4.99 (H) 0.76 - 1.27 mg/dL    Sodium 141 136 - 145 mmol/L    Potassium 6.8 (C) 3.5 - 5.2 mmol/L    Chloride 106 98 - 107 mmol/L    CO2 21.0 (L) 22.0 - 29.0 mmol/L    Calcium 9.3 8.6 - 10.5 mg/dL    Total Protein 6.0 6.0 - 8.5 g/dL    Albumin 2.6 (L) 3.5 - 5.2 g/dL    ALT (SGPT) 57 (H) 1 - 41 U/L    AST (SGOT) 49 (H) 1 - 40 U/L    Alkaline Phosphatase 191 (H) 39 - 117 U/L     Total Bilirubin 0.3 0.0 - 1.2 mg/dL    Globulin 3.4 gm/dL    A/G Ratio 0.8 g/dL    BUN/Creatinine Ratio 7.6 7.0 - 25.0    Anion Gap 14.0 5.0 - 15.0 mmol/L    eGFR 13.1 (L) >60.0 mL/min/1.73   Magnesium    Collection Time: 11/12/23 11:49 PM    Specimen: Blood   Result Value Ref Range    Magnesium 2.6 1.6 - 2.6 mg/dL   Calcium, Ionized    Collection Time: 11/12/23 11:49 PM    Specimen: Blood   Result Value Ref Range    Ionized Calcium 1.22 1.20 - 1.30 mmol/L   Amylase    Collection Time: 11/12/23 11:49 PM    Specimen: Blood   Result Value Ref Range    Amylase 116 (H) 28 - 100 U/L   Lipase    Collection Time: 11/12/23 11:49 PM    Specimen: Blood   Result Value Ref Range    Lipase 68 (H) 13 - 60 U/L   Protime-INR    Collection Time: 11/12/23 11:49 PM    Specimen: Blood   Result Value Ref Range    Protime 12.8 (H) 9.6 - 11.7 Seconds    INR 1.19 (H) 0.93 - 1.10   aPTT    Collection Time: 11/12/23 11:49 PM    Specimen: Blood   Result Value Ref Range    PTT 41.2 (H) 24.0 - 31.0 seconds   Bilirubin, Direct    Collection Time: 11/12/23 11:49 PM    Specimen: Blood   Result Value Ref Range    Bilirubin, Direct 0.2 0.0 - 0.3 mg/dL   CBC Auto Differential    Collection Time: 11/12/23 11:49 PM    Specimen: Blood   Result Value Ref Range    WBC 35.00 (C) 3.40 - 10.80 10*3/mm3    RBC 2.58 (L) 4.14 - 5.80 10*6/mm3    Hemoglobin 8.0 (L) 13.0 - 17.7 g/dL    Hematocrit 24.2 (L) 37.5 - 51.0 %    MCV 93.9 79.0 - 97.0 fL    MCH 30.9 26.6 - 33.0 pg    MCHC 33.0 31.5 - 35.7 g/dL    RDW 13.9 12.3 - 15.4 %    RDW-SD 48.1 37.0 - 54.0 fl    MPV 9.3 6.0 - 12.0 fL    Platelets 134 (L) 140 - 450 10*3/mm3    Neutrophil % 95.7 (H) 42.7 - 76.0 %    Lymphocyte % 3.2 (L) 19.6 - 45.3 %    Monocyte % 0.4 (L) 5.0 - 12.0 %    Eosinophil % 0.1 (L) 0.3 - 6.2 %    Basophil % 0.6 0.0 - 1.5 %    Neutrophils, Absolute 33.40 (H) 1.70 - 7.00 10*3/mm3    Lymphocytes, Absolute 1.10 0.70 - 3.10 10*3/mm3    Monocytes, Absolute 0.10 0.10 - 0.90 10*3/mm3    Eosinophils,  Absolute 0.00 0.00 - 0.40 10*3/mm3    Basophils, Absolute 0.20 0.00 - 0.20 10*3/mm3    nRBC 0.2 0.0 - 0.2 /100 WBC   Phosphorus    Collection Time: 11/12/23 11:49 PM    Specimen: Blood   Result Value Ref Range    Phosphorus 3.2 2.5 - 4.5 mg/dL   Scan Slide    Collection Time: 11/12/23 11:49 PM    Specimen: Blood   Result Value Ref Range    RBC Morphology Normal Normal    WBC Morphology Normal Normal    Large Platelets Slight/1+ None Seen   POC Glucose Once    Collection Time: 11/12/23 11:55 PM    Specimen: Blood   Result Value Ref Range    Glucose 223 (H) 70 - 105 mg/dL   Blood Gas, Arterial -    Collection Time: 11/13/23 12:21 AM    Specimen: Arterial Blood   Result Value Ref Range    Site Arterial Line     Glenroy's Test N/A     pH, Arterial 7.291 (L) 7.350 - 7.450 pH units    pCO2, Arterial 43.6 35.0 - 48.0 mm Hg    pO2, Arterial 412.3 (H) 83.0 - 108.0 mm Hg    HCO3, Arterial 21.0 21.0 - 28.0 mmol/L    Base Excess, Arterial -5.2 (L) 0.0 - 3.0 mmol/L    O2 Saturation, Arterial 100.0 (H) 94.0 - 98.0 %    CO2 Content 22.4 22 - 29 mmol/L    Barometric Pressure for Blood Gas      Modality Adult Vent     FIO2 100 %    Ventilator Mode PC     PEEP 5     PIP 25 cmH2O    Hemodilution No     Respiratory Rate 30     Inspiratory Time 1    POC Glucose Once    Collection Time: 11/13/23  1:07 AM    Specimen: Blood   Result Value Ref Range    Glucose 198 (H) 70 - 105 mg/dL   aPTT    Collection Time: 11/13/23  1:55 AM    Specimen: Blood   Result Value Ref Range    PTT 41.7 (H) 24.0 - 31.0 seconds   Protime-INR    Collection Time: 11/13/23  1:55 AM    Specimen: Blood   Result Value Ref Range    Protime 13.0 (H) 9.6 - 11.7 Seconds    INR 1.21 (H) 0.93 - 1.10   POC Glucose Once    Collection Time: 11/13/23  1:58 AM    Specimen: Blood   Result Value Ref Range    Glucose 211 (H) 70 - 105 mg/dL   Urinalysis With Microscopic If Indicated (No Culture) - Urine, Clean Catch    Collection Time: 11/13/23  3:37 AM    Specimen: Urine, Clean Catch    Result Value Ref Range    Color, UA Orange (A) Yellow, Straw    Appearance, UA Turbid (A) Clear    pH, UA 6.0 5.0 - 8.0    Specific Gravity, UA 1.020 1.005 - 1.030    Glucose, UA Negative Negative    Ketones, UA Trace (A) Negative    Bilirubin, UA Small (1+) (A) Negative    Blood, UA Large (3+) (A) Negative    Protein, UA >=300 mg/dL (3+) (A) Negative    Leuk Esterase, UA Negative Negative    Nitrite, UA Negative Negative    Urobilinogen, UA 0.2 E.U./dL 0.2 - 1.0 E.U./dL   Urinalysis, Microscopic Only - Urine, Clean Catch    Collection Time: 11/13/23  3:37 AM    Specimen: Urine, Clean Catch   Result Value Ref Range    RBC, UA Too Numerous to Count (A) None Seen, 0-2 /HPF    WBC, UA 21-50 (A) None Seen, 0-2 /HPF    Bacteria, UA Trace (A) None Seen /HPF    Squamous Epithelial Cells, UA 0-2 None Seen, 0-2 /HPF    Hyaline Casts, UA None Seen None Seen /LPF    Sperm, UA Occasional (A) None Seen /HPF    Methodology Manual Light Microscopy    aPTT    Collection Time: 11/13/23  4:08 AM    Specimen: Blood   Result Value Ref Range    PTT 40.2 (H) 24.0 - 31.0 seconds   Protime-INR    Collection Time: 11/13/23  4:08 AM    Specimen: Blood   Result Value Ref Range    Protime 13.0 (H) 9.6 - 11.7 Seconds    INR 1.21 (H) 0.93 - 1.10   POC Glucose Once    Collection Time: 11/13/23  4:10 AM    Specimen: Blood   Result Value Ref Range    Glucose 216 (H) 70 - 105 mg/dL   Blood Gas, Arterial -    Collection Time: 11/13/23  4:56 AM    Specimen: Arterial Blood   Result Value Ref Range    Site Arterial Line     Glenroy's Test N/A     pH, Arterial 7.340 (L) 7.350 - 7.450 pH units    pCO2, Arterial 43.1 35.0 - 48.0 mm Hg    pO2, Arterial 78.9 (L) 83.0 - 108.0 mm Hg    HCO3, Arterial 23.3 21.0 - 28.0 mmol/L    Base Excess, Arterial -2.4 (L) 0.0 - 3.0 mmol/L    O2 Saturation, Arterial 94.7 94.0 - 98.0 %    CO2 Content 24.6 22 - 29 mmol/L    Barometric Pressure for Blood Gas      Modality Adult Vent     FIO2 40 %    Ventilator Mode PC     PEEP 7      PIP 22 cmH2O    Hemodilution No     Inspiratory Time 1    Calcium, Ionized    Collection Time: 11/13/23  5:42 AM    Specimen: Blood   Result Value Ref Range    Ionized Calcium 1.27 1.20 - 1.30 mmol/L   Protime-INR    Collection Time: 11/13/23  5:42 AM    Specimen: Blood   Result Value Ref Range    Protime 12.9 (H) 9.6 - 11.7 Seconds    INR 1.20 (H) 0.93 - 1.10   aPTT    Collection Time: 11/13/23  5:42 AM    Specimen: Blood   Result Value Ref Range    PTT 39.4 (H) 24.0 - 31.0 seconds   CBC Auto Differential    Collection Time: 11/13/23  5:42 AM    Specimen: Blood   Result Value Ref Range    WBC 33.20 (C) 3.40 - 10.80 10*3/mm3    RBC 2.15 (L) 4.14 - 5.80 10*6/mm3    Hemoglobin 7.0 (L) 13.0 - 17.7 g/dL    Hematocrit 20.6 (C) 37.5 - 51.0 %    MCV 95.9 79.0 - 97.0 fL    MCH 32.3 26.6 - 33.0 pg    MCHC 33.7 31.5 - 35.7 g/dL    RDW 13.9 12.3 - 15.4 %    RDW-SD 46.4 37.0 - 54.0 fl    MPV 9.0 6.0 - 12.0 fL    Platelets 147 140 - 450 10*3/mm3    nRBC 0.4 (H) 0.0 - 0.2 /100 WBC   Blood Gas, Arterial -    Collection Time: 11/13/23  5:53 AM    Specimen: Arterial Blood   Result Value Ref Range    Site Arterial Line     Glenroy's Test N/A     pH, Arterial 7.355 7.350 - 7.450 pH units    pCO2, Arterial 44.3 35.0 - 48.0 mm Hg    pO2, Arterial 413.4 (H) 83.0 - 108.0 mm Hg    HCO3, Arterial 24.7 21.0 - 28.0 mmol/L    Base Excess, Arterial -0.8 (L) 0.0 - 3.0 mmol/L    O2 Saturation, Arterial 100.0 (H) 94.0 - 98.0 %    CO2 Content 26.1 22 - 29 mmol/L    Barometric Pressure for Blood Gas      Modality Adult Vent     FIO2 100 %    Ventilator Mode PC     PEEP 7     PIP 24 cmH2O    Hemodilution No     Respiratory Rate 30     Inspiratory Time 1    POC Glucose Once    Collection Time: 11/13/23  6:07 AM    Specimen: Blood   Result Value Ref Range    Glucose 171 (H) 70 - 105 mg/dL          Imaging:  CT Chest Without Contrast Diagnostic    Addendum Date: 11/12/2023    ADDENDUM #1 Per Jesus the following measurements of the chest are as follows:  Right lung length: 18.5 cm. Left lung length: 19.4 cm Aortic knob width 2.76 cm Distance between right and left pulmonary artery 7.2 cm Width chest at level of diaphragm 26 cm These measurements may not be approximate and may need to be reassessed by transplant team.. As noted on CT chest there are bilateral pulmonary infiltrates as well as underlying pleural effusions.. Electronically Signed: Jose Smallwood MD  11/12/2023 7:37 PM EST  Workstation ID: [id number] ORIGINAL REPORT: CT CHEST WO CONTRAST DIAGNOSTIC, CT ABDOMEN PELVIS WO CONTRAST Date of Exam: 11/12/2023 8:19 AM EST Indication: ashley. Comparison: CT chest November 3, 2023, CT abdomen pelvis November 5, 2023 Technique: Axial CT images were obtained of the chest, abdomen and pelvis without contrast administration.  Sagittal and coronal reconstructions were performed.  Automated exposure control and iterative reconstruction methods were used. Findings: CT chest: There are small bilateral pleural effusions. There are patchy groundglass and alveolar areas of density within both lungs that could relate to multifocal inflammatory infectious process. There is debris within the left mainstem bronchus. There has been some interval worsening in appearance of the lungs since prior CT. CT abdomen/pelvis: There is no definite abnormality of the liver. There is absence of the left kidney. Right kidney grossly unremarkable. Spleen does not appear unusual. Pancreas grossly unremarkable. There is a G-tube present. It looks like there is a rectal tube. There are some fluid-filled loops of small bowel. This might relate to ileus or enteritis. There is less distention of small bowel loops as compared to prior exam. There is a ventral wall abdominal hernia which appears to contain fat. There is edema in the subcutaneous soft tissues and some fluid in the presacral area which has been suggested and may be reflective of anasarca. There is a catheter within the bladder. There is some  atherosclerotic vascular calcification. There are degenerative changes involving the lower lumbar spine. Impression: 1.Multifocal pulmonary infiltrates with some interval worsening since prior CT. 2.Bibasilar effusions. 3.Debris left mainstem bronchus. 4.Less distention of small bowel loops as compared to prior study. There remain fluid-filled loops of small bowel that may be reflective of an ileus or enteritis. 5.Subcutaneous edema as well fluid presacral area with some interval worsening that could relate to anasarca. 6.Absent left kidney. 7.Ventral wall abdominal hernia which appears to contain fat. 8.Degenerative change lumbar spine. Electronically Signed: Jose Smallwood MD  11/12/2023 8:35 AM EST  Workstation ID: [id number]    Result Date: 11/12/2023  Impression: 1.Multifocal pulmonary infiltrates with some interval worsening since prior CT. 2.Bibasilar effusions. 3.Debris left mainstem bronchus. 4.Less distention of small bowel loops as compared to prior study. There remain fluid-filled loops of small bowel that may be reflective of an ileus or enteritis. 5.Subcutaneous edema as well fluid presacral area with some interval worsening that could relate to anasarca. 6.Absent left kidney. 7.Ventral wall abdominal hernia which appears to contain fat. 8.Degenerative change lumbar spine. Electronically Signed: Jose Smallwood MD  11/12/2023 8:35 AM EST  Workstation ID: [id number]    CT Abdomen Pelvis Without Contrast    Addendum Date: 11/12/2023    ADDENDUM #1 Per Jesus the following measurements of the chest are as follows: Right lung length: 18.5 cm. Left lung length: 19.4 cm Aortic knob width 2.76 cm Distance between right and left pulmonary artery 7.2 cm Width chest at level of diaphragm 26 cm These measurements may not be approximate and may need to be reassessed by transplant team.. As noted on CT chest there are bilateral pulmonary infiltrates as well as underlying pleural effusions.. Electronically Signed: Jose Smallwood  MD  11/12/2023 7:37 PM EST  Workstation ID: [id number] ORIGINAL REPORT: CT CHEST WO CONTRAST DIAGNOSTIC, CT ABDOMEN PELVIS WO CONTRAST Date of Exam: 11/12/2023 8:19 AM EST Indication: ashley. Comparison: CT chest November 3, 2023, CT abdomen pelvis November 5, 2023 Technique: Axial CT images were obtained of the chest, abdomen and pelvis without contrast administration.  Sagittal and coronal reconstructions were performed.  Automated exposure control and iterative reconstruction methods were used. Findings: CT chest: There are small bilateral pleural effusions. There are patchy groundglass and alveolar areas of density within both lungs that could relate to multifocal inflammatory infectious process. There is debris within the left mainstem bronchus. There has been some interval worsening in appearance of the lungs since prior CT. CT abdomen/pelvis: There is no definite abnormality of the liver. There is absence of the left kidney. Right kidney grossly unremarkable. Spleen does not appear unusual. Pancreas grossly unremarkable. There is a G-tube present. It looks like there is a rectal tube. There are some fluid-filled loops of small bowel. This might relate to ileus or enteritis. There is less distention of small bowel loops as compared to prior exam. There is a ventral wall abdominal hernia which appears to contain fat. There is edema in the subcutaneous soft tissues and some fluid in the presacral area which has been suggested and may be reflective of anasarca. There is a catheter within the bladder. There is some atherosclerotic vascular calcification. There are degenerative changes involving the lower lumbar spine. Impression: 1.Multifocal pulmonary infiltrates with some interval worsening since prior CT. 2.Bibasilar effusions. 3.Debris left mainstem bronchus. 4.Less distention of small bowel loops as compared to prior study. There remain fluid-filled loops of small bowel that may be reflective of an ileus or  enteritis. 5.Subcutaneous edema as well fluid presacral area with some interval worsening that could relate to anasarca. 6.Absent left kidney. 7.Ventral wall abdominal hernia which appears to contain fat. 8.Degenerative change lumbar spine. Electronically Signed: Jose Smallwood MD  11/12/2023 8:35 AM EST  Workstation ID: [id number]    Result Date: 11/12/2023  Impression: 1.Multifocal pulmonary infiltrates with some interval worsening since prior CT. 2.Bibasilar effusions. 3.Debris left mainstem bronchus. 4.Less distention of small bowel loops as compared to prior study. There remain fluid-filled loops of small bowel that may be reflective of an ileus or enteritis. 5.Subcutaneous edema as well fluid presacral area with some interval worsening that could relate to anasarca. 6.Absent left kidney. 7.Ventral wall abdominal hernia which appears to contain fat. 8.Degenerative change lumbar spine. Electronically Signed: Jose Smallwood MD  11/12/2023 8:35 AM EST  Workstation ID: [id number]    XR Chest 1 View    Result Date: 11/12/2023  Impression: 1.Bilateral perihilar pulmonary infiltrates. Electronically Signed: Jose Smallwood MD  11/12/2023 4:46 PM EST  Workstation ID: [id number]    XR Chest 1 View    Result Date: 11/12/2023  Impression: Cardiomegaly with diffuse pulmonary edema. Electronically Signed: Zurdo Yañez MD  11/12/2023 3:32 AM EST  Workstation ID: [id number]    NM Brain Scan Complete With Vascular Flow    Result Date: 11/10/2023  Impression: No intracranial radiopharmaceutical uptake is seen, corresponding to a clinical suspicion of brain death. I spoke with the patient's nurse, Melba, regarding the findings of this examination on 11/10/2023 at 1:50 p.m. Electronically Signed: Yaritza Chaparro MD  11/10/2023 1:51 PM EST  Workstation ID: [id number]    XR Chest 1 View    Result Date: 11/10/2023  Impression: 1. Placement of tracheostomy tube with tip above the andry. 2. NG tube removed. 3. Stable right upper extremity PICC and  left sided central venous catheter. 4. Stable findings suggesting pulmonary edema. Electronically Signed: Andrea Galarza MD  11/10/2023 7:15 AM EST  Workstation ID: [id number]    XR Abdomen KUB    Result Date: 11/9/2023  Impression: Nonobstructive bowel gas pattern. Mild stool burden present. Gastrostomy tube within the left hemiabdomen. Electronically Signed: Caitlyn Urbina MD  11/9/2023 2:00 AM EST  Workstation ID: [id number]    XR Abdomen KUB    Result Date: 11/8/2023  Impression: 1. Moderate gas distention of mid abdominal bowel, thought to represent the colon. Findings may reflect changes of mild ileus. 2. No convincing evidence of high-grade bowel obstruction on this examination. 3. Percutaneous gastrostomy tube projects of the left upper quadrant of the abdomen, newly placed since the prior study. No gross free intraperitoneal air is evident. Electronically Signed: Yaritza Chaparro MD  11/8/2023 8:44 AM EST  Workstation ID: [id number]    IR Insert Tunneled CV Catheter Without Port 5 Plus    Result Date: 11/7/2023  1. Successful placement of left IJ PermCath. Electronically Signed: Agus Perez MD  11/7/2023 11:16 AM EST  Workstation ID: [id number]    US Liver    Result Date: 11/6/2023  Impression: No acute sonographic abnormality of the liver. Electronically Signed: Andrea Galarza MD  11/6/2023 7:43 AM EST  Workstation ID: [id number]    XR Chest 1 View    Result Date: 11/6/2023  Impression: 1. Stable lines and support tubes. 2. Negative for pneumothorax. 3. Persistent central pulmonary vascular congestion and findings suggesting mild pulmonary edema, not significantly changed from the prior study. Electronically Signed: Andrea Galarza MD  11/6/2023 7:07 AM EST  Workstation ID: [id number]    CT Abdomen Pelvis Without Contrast    Result Date: 11/5/2023  Impression: 1. Nonspecific fluid and mixed liquid stool throughout the abdomen without signs of overt obstruction or active inflammation. Correlate for diarrheal illness. 2.  Increasing patchy airspace opacities in the lower lobes suspicious for pneumonia versus aspiration. 3. Retained contrast in the right kidney suggesting acute tubular necrosis. No hydronephrosis. 4. Findings of mild volume overload/third spacing including body wall edema, presacral edema and trace ascites. No drainable collection. 5. Other ancillary findings as above. Electronically Signed: Jermaine Heath MD  11/5/2023 11:15 AM EST  Workstation ID: [id number]    MRI Brain Without Contrast    Result Date: 11/5/2023  Impression: MRI findings suggesting diffuse anoxic brain injury. Electronically Signed: Jermaine Heath MD  11/5/2023 10:57 AM EST  Workstation ID: [id number]     Assessment/Plan:     Cardiac arrest with successful resuscitation    Marijuana abuse    Alcohol abuse    Elevated blood sugar       Acute kidney injury   Severe metabolic/ respiratory acidosis   Cardiopulmonary arrest   ARDS   Possible anoxic brain injury   Hyperglycemia   Elevated troponin   Hypokalemia   Hypernatremia   Lactic acidosis  Recommendations:   Potassium is high as 7.2 in spite of CRRT   Calcium gluconate  Organ harvesting later today

## 2023-11-13 NOTE — CASE MANAGEMENT/SOCIAL WORK
Continued Stay Note   Tao     Patient Name: Yusuf Jin  MRN: [id number]  Today's Date: 11/13/2023    Admit Date: 11/2/2023    Plan: DC Plan: GUIDO patient. Brain death confirmed 11/10/2023 at 13:50   Discharge Plan       Row Name 11/13/23 1425       Plan    Plan DC Plan: GUIDO patient. Brain death confirmed 11/10/2023 at 13:50    Patient/Family in Agreement with Plan yes    Provided Post Acute Provider List? N/A    Provided Post Acute Provider Quality & Resource List? N/A    Plan Comments CM reviewed chart documentation to obtain clinical updates. Surgery date and time not yet determined. CM will remain available if needed.               Expected Discharge Date and Time       Expected Discharge Date Expected Discharge Time    Nov 14, 2023               Pauline Rm RN     Office Phone: [phone]  Office Cell:     [phone]

## 2023-11-14 LAB
BACTERIA SPEC AEROBE CULT: NORMAL
BACTERIA SPEC AEROBE CULT: NORMAL
QT INTERVAL: 675 MS
QTC INTERVAL: 627 MS

## 2023-11-14 NOTE — PAYOR COMM NOTE
"This is discharge notification for Yusuf Jin   Reference/Auth # [id number]   Pt  in acute care on 23.    Mariela Bear, RN, BSN  Utilization Review Nurse  [facility]  Direct & confidential phone # [phone]  Fax # [phone]        Yusuf Jin (Dcsd. Male)       Date of Birth   1970    Social Security Number       Address   [address] [place] [address]    Home Phone   [phone]    MRN   [id number]       Yarsani   Unknown    Marital Status   Unknown                            Admission Date   23    Admission Type   Emergency    Admitting Provider   Victoriano Phillip DO    Attending Provider       Department, Room/Bed   [facility] MAIN OR, [facility] MAIN OR/MAIN OR       Discharge Date   2023    Discharge Disposition       Discharge Destination   Other                              Attending Provider: (none)   Allergies: No Known Allergies    Isolation: None   Infection: None   Code Status: No CPR    Ht: 175.3 cm (69\")   Wt: 88.5 kg (195 lb 1.7 oz)    Admission Cmt: None   Principal Problem: Cardiac arrest with successful resuscitation [I46.9]                   Active Insurance as of 2023       Primary Coverage       Payor Plan Insurance Group Employer/Plan Group    ANTHEM MEDICAID AN[place]M MEDICAID [id number]       Payor Plan Address Payor Plan Phone Number Payor Plan Fax Number Effective Dates    [address] [address] [phone]  2019 - None Entered    [facility] [id number]         Subscriber Name Subscriber Birth Date Member ID       YUSUF JIN 1970 [id number]                     Emergency Contacts        (Rel.) Home Phone Work Phone Mobile Phone    Tamiko Jin (Daughter) -- -- [phone]    Yusuf Interiano (Son) -- -- [phone]    Huong Jin (Mother) -- -- [phone]    Bree Riggins (Sister) -- -- [phone]                 Discharge Summary    "     Tommy Mendieta MD at 11/10/23 1402            Death Summary     Yusuf Jin 1970 [id number]     Date of Admission:11/2/2023     Date of Discharge: 11/10/23     Admission Diagnosis: Anoxic brain injury [G93.1]  History of sudden cardiac arrest successfully resuscitated [Z86.74]  Accidental fentanyl overdose, initial encounter [T40.411A]  Cardiac arrest with successful resuscitation [I46.9]     Discharge Diagnosis:     Cardiac arrest with successful resuscitation    Marijuana abuse    Alcohol abuse    Elevated blood sugar       Consults:  Neurology, nephrology, ID , general surgery, palliative care    Cause of death: Cardiac arrest due to marijuana abuse  Time of death:  11/10/2023 @ 1.50PM    Hospital Course: A 52 y.o. male with PMH of marijuana abuse and alcoholism presented to the hospital after a witnessed cardiac arrest and was admitted with a principal diagnosis of Cardiac arrest with successful resuscitation. Apparently, his marijuana was tested positive for fentanyl on a home kit, done by his sister.  Initial rhythm of asystole, unknown duration of CPR by EMS, multiple rounds of CPR in ED.  Intubated in the ED for hypoxic respiratory failure.   CT angio showed multifocal pneumonia.  CT head on admission with poor gray-white differentiation.  MRI brain on 11/5 showed diffuse anoxic brain injury.  Neurology was consulted and deemed poor prognosis.     On admission, he also went into full-blown septic shock, treated with broad-spectrum antibiotics and needed multiple vasopressors.  Also had CONOR with metabolic acidosis, treated with bicarb drip and nephrology was consulted.  Eventually needed dialysis and had tunneled HD catheter placement. Subsequently, also developed right hand ischemia due to radial artery dissection, vascular surgery was consulted and recommended conservative management.     Palliative care consulted for goals of care discussion.  Family opted for trach/PEG and LTAC  placement.  Subsequently, patient progressed to brain death.  Brain death testing was done on 11/10/2023 by , brain flow study showed no cerebral blood flow.  Discussed with Dr. Sher and patient is declared brain death.    Tommy Mendieta MD  Critical Care  11/10/23   14:14 EST       Electronically signed by Tommy Mendieta MD at 11/10/23 1281

## 2023-11-14 NOTE — ANESTHESIA POSTPROCEDURE EVALUATION
Patient: Yusuf Jin    Procedure Summary       Date: 11/13/23 Room / Location: [facility] OR 10 / [facility] MAIN OR    Anesthesia Start: 1729 Anesthesia Stop:     Procedure: ORGAN PROCUREMENT (Abdomen) Diagnosis:     Surgeons: Jesus Lee Provider: Rukhsana Hobbs CRNA    Anesthesia Type: general ASA Status: 6            Anesthesia Type: general    Vitals  Vitals Value Taken Time   BP     Temp 96.44 °F (35.8 °C) 11/13/23 1729   Pulse 99 11/13/23 1729   Resp     SpO2 100 % 11/13/23 1729   Vitals shown include unfiled device data.        Post Anesthesia Care and Evaluation      Comments: Organ donor  No anesthesia care post op

## 2023-11-18 NOTE — OP NOTE
Operative Report:    Patient Name:  Yusuf Jin  YOB: 1970    Date of Surgery:  11/6/2023     Indications:    52-year-old gentleman who was a witnessed arrest at home had return of spontaneous circulation.  Currently in the intensive care unit intubated weaning off of pressors no tube feeds yet.  No neck incisions or known history of neck radiation, has a small fat-containing epigastric hernia and some port incisions family unaware of his previous surgical history. I discussed risk, benefits and alternatives to percutaneous endoscopic gastrostomy (PEG) placement including infection, bleeding, injury to colon or other intra-abdominal organs, tube dislodgment resulting in sepsis, and tube malfunction requiring further procedures patient's family elected to proceed with surgery.   I discussed risk benefits and alternatives to tracheostomy creation including, infection, bleeding, injury to recurrent laryngeal nerve resulting in vocal cord paralysis, major vascular injury and patient's family elected to proceed with surgery.      Pre-op Diagnosis:   Anoxic brain injury       Post-Op Diagnosis Codes:  Name    Procedure/CPT® Codes:      Procedure(s):  TRACHEOSTOMY  ESOPHAGOGASTRODUODENOSCOPY WITH PERCUTANEOUS ENDOSCOPIC GASTROSTOMY TUBE INSERTION    Staff:  Surgeon(s):  Rm Reeves MD    Circulator: Francisca Alvarez RN; Kasey Pedroza RN  Scrub Person: Andria Mariano  Assistant: Arely Cooper  was responsible for performing the following activities: Retraction, Suction, Irrigation, Suturing, Closing, and Placing Dressing and their skilled assistance was necessary for the success of this case.  Endo Nurse: Vernell Ugalde RN          Anesthesia: General    Estimated Blood Loss: minimal    Implants:    Nothing was implanted during the procedure    Specimen:          None        Findings: Normal-appearing trachea, appropriate window for PEG    Complications: None    Description of  Procedure:   After risk benefits and alternatives were discussed with patient's family, informed consent was obtained.  She was transported to the operating room placed supine on the operating room table.  She had already been intubated in the ICU, and anesthesia provider took over management of general anesthesia, hemodynamics and airway.  Patient's neck was prepped and draped in sterile fashion and the surgical timeout was completed.    I began by making a 2 cm horizontal incision 2 cm above the patient's sternal notch with a 15 blade scalpel.  Electrocautery was used to dissect down through the platysma and a small subplatysmal flap was created.  Once the anterior jugular veins were identified and spared, a vertical incision was made through the midline of the neck between the strap muscles.  The isthmus of the thyroid was identified and divided with electrocautery, and the trachea was identified.  A cricoid hook retractor was placed under the cricoid and used to elevate the trachea into the wound.    Using 11 blade scalpel a horizontal incision was made in the anterior trachea between the second and third tracheal rings, a trach  was used to widen the tracheal incision. Two 0 Ethibond sutures were placed around the second and third tracheal rings.  A size 8 cuffed trach was inserted through the tracheostomy and the cuff was inflated.  The vent was switched to the trach and end-tidal CO2 was measured confirming appropriate placement of the trach.  Patient continued oxygenating and ventilating well.  Four 2-0 silk sutures were used to secure the tracheostomy to the skin.  The Ethibond sutures were tied loosely and secured to the patient's chest with a Tegaderm, to facilitate replacement of the trach if it should get dislodged in the early postoperative period.     I then proceeded with PEG placement.  A bite-block was inserted into the patient's mouth.  An EGD scope was inserted through the patient's mouth  down the patient's esophagus and into the patient's stomach, through the pylorus and into the second portion of the duodenum without difficulty.  Scope was withdrawn in the stomach and duodenum were inspected with no abnormalities identified.  The patient stomach was maximally insufflated with air.  Transillumination was confirmed in the patient's right upper quadrant as well as 1-1 palpation, and it appeared that there was a safe window to attempt PEG placement.    The patient's abdomen was prepped and draped in sterile fashion.  Lidocaine was used to infiltrate the area where transillumination was identified.  A 1 cm incision was made with an 11 blade scalpel.  An Angiocath was inserted through the incision and into the patient's stomach under endoscopic visualization.  The needle was removed, and a guidewire was placed through the Angiocath.  The wire was grasped with an endoscopic snare and the endoscope and wire were removed from the patient's mouth.  The PEG tube was threaded over the guidewire and the Seldinger technique.  This was pulled down into the patient's stomach and through the patient's abdominal wall.    Repeat endoscopy confirmed appropriate PEG placement with the bumper secured to the abdominal wall and stomach, but not too tight to cause necrosis.  The tube was secured at 6 cm.  The endoscope was withdrawn and esophagus was inspected on the way out with no injury or abnormalities identified.  The PEG tube was placed to gravity drainage and an abdominal binder was placed.  Before the patient left the operating room the sponge needle and instrument count was reported to me as correct x2.  Patient on the ventilator through the tracheostomy as planned, and was transported back to the ICU room without incident.       Rm Reeves MD     Date: 11/18/2023  Time: 13:49 EST    This note was created using Dragon Voice Recognition software.

## 2023-11-27 LAB — REF LAB TEST RESULTS: NORMAL

## 2024-05-29 NOTE — SIGNIFICANT NOTE
"Case discussed extensively with multiple physicians, including nephrology and neurology.  Spent several hours trying to find family today.  Appreciate assistance of case mgmt and social work.  Family meeting with pt's daughter, son, their mother, one of pt's brothers, pt's mother, and CM this afternoon.  Case discussed in detail, including the fact that CT imaging already showing signs of brain damage.  They have asked me to send testing for fentanyl and other synthetic drugs, which I will do.  I discussed that, if we can put him supine, that I need to place a vascath and start him on dialysis.  They are agreeable to proceed with this.  It should be noted that the person referred to as \"Kristine\" is NOT his sister and not kin in any way whatsoever.  Family has elected for FULL CODE status.  " Detail Level: Detailed Depth Of Biopsy: dermis Was A Bandage Applied: Yes Size Of Lesion In Cm: 0.6 X Size Of Lesion In Cm: 0 Biopsy Type: H and E Biopsy Method: Dermablade Anesthesia Type: 1% lidocaine without epinephrine Anesthesia Volume In Cc: 0.5 Hemostasis: Drysol Wound Care: Petrolatum Dressing: bandage Destruction After The Procedure: No Type Of Destruction Used: Curettage Curettage Text: The wound bed was treated with curettage after the biopsy was performed. Cryotherapy Text: The wound bed was treated with cryotherapy after the biopsy was performed. Electrodesiccation Text: The wound bed was treated with electrodesiccation after the biopsy was performed. Electrodesiccation And Curettage Text: The wound bed was treated with electrodesiccation and curettage after the biopsy was performed. Silver Nitrate Text: The wound bed was treated with silver nitrate after the biopsy was performed. Lab: [id number] Consent: Written consent was obtained and risks were reviewed including but not limited to scarring, infection, bleeding, scabbing, incomplete removal, nerve damage and allergy to anesthesia. Post-Care Instructions: I reviewed with the patient in detail post-care instructions. Patient is to keep the biopsy site dry overnight, and then apply petrolatum or Aquaphor twice daily until healed. Patient may apply hydrogen peroxide soaks to remove any crusting. Notification Instructions: Patient will be notified of biopsy results. However, patient instructed to call the office if not contacted within 2 weeks. Billing Type: Third-Party Bill Information: Selecting Yes will display possible errors in your note based on the variables you have selected. This validation is only offered as a suggestion for you. PLEASE NOTE THAT THE VALIDATION TEXT WILL BE REMOVED WHEN YOU FINALIZE YOUR NOTE. IF YOU WANT TO FAX A PRELIMINARY NOTE YOU WILL NEED TO TOGGLE THIS TO 'NO' IF YOU DO NOT WANT IT IN YOUR FAXED NOTE.

## (undated) DEVICE — SUT SILK 0 TIES 30IN A306H

## (undated) DEVICE — Device

## (undated) DEVICE — 3M™ IOBAN™ 2 ANTIMICROBIAL INCISE DRAPE 6650EZ: Brand: IOBAN™ 2

## (undated) DEVICE — KT SURG TURNOVER 050

## (undated) DEVICE — TP UMB COTN 1/8X36 U12T

## (undated) DEVICE — TUBING, SUCTION, 1/4" X 12', STRAIGHT: Brand: MEDLINE

## (undated) DEVICE — GOWN,REINFRCE,POLY,SIRUS,BREATH SLV,XXLG: Brand: MEDLINE

## (undated) DEVICE — PENCL E/S ULTRAVAC TELESCP NOSE HOLSTR 10FT

## (undated) DEVICE — COVER,TABLE,HEAVY DUTY,79"X110",STRL: Brand: MEDLINE

## (undated) DEVICE — LP VESL MAXI 2.5X1MM RED 2PK

## (undated) DEVICE — COVER,TABLE,44X90,STERILE: Brand: MEDLINE

## (undated) DEVICE — SINGLE BASIN PLUS 3-LF: Brand: MEDLINE INDUSTRIES, INC.

## (undated) DEVICE — ST BLD PUMP NV 180M/FLTR LL 38ML 126IN

## (undated) DEVICE — NDL HYPO ECLPS SFTY 25G 1 1/2IN

## (undated) DEVICE — TBG PENCL TELESCP MEGADYNE SMOKE EVAC 15FT

## (undated) DEVICE — SUT SILK 2/0 SH 30IN K833H

## (undated) DEVICE — SUT ETHLN 2 LR D/A 30IN 490T

## (undated) DEVICE — SPONGE,DISSECTOR,ROUND CHERRY,XR,ST,5/PK: Brand: MEDLINE

## (undated) DEVICE — MARKR SKIN 2TP W/RULR

## (undated) DEVICE — GOWN,REINFORCE,POLY,SIRUS,BREATH SLV,XLG: Brand: MEDLINE

## (undated) DEVICE — DBD-PACK,UNIVERSAL,W/2 GOWNS: Brand: MEDLINE

## (undated) DEVICE — CVR HNDL LT CAM LB54

## (undated) DEVICE — SUT SILK 3/0 TIES 18IN A184H

## (undated) DEVICE — PENCL HND ROCKRSWTCH HOLSTR EZ CLEAN TP CRD 10FT

## (undated) DEVICE — SOLUTION,WATER,IRRIGATION,1000ML,STERILE: Brand: MEDLINE

## (undated) DEVICE — STERNUM BLADE, OFFSET (31.7 X 0.64 X 6.3MM)

## (undated) DEVICE — APPL CHLORAPREP 1 STEP 10.5ML

## (undated) DEVICE — TOWEL,OR,DSP,ST,WHITE,DLX,4/PK,20PK/CS: Brand: MEDLINE

## (undated) DEVICE — DRP SLUSH MACH

## (undated) DEVICE — GLV SURG BIOGEL LTX PF 7

## (undated) DEVICE — GAUZE,SPONGE,4"X4",32PLY,XRAY,STRL,LF: Brand: MEDLINE

## (undated) DEVICE — KT PEG ENDOVIVE ENFIT SFTY PUSH 20F 1P/U

## (undated) DEVICE — SYR LL TP 10ML STRL

## (undated) DEVICE — SUT SILK 4/0 TIES 18IN A183H

## (undated) DEVICE — PACK,UNIVERSAL,NO GOWNS: Brand: MEDLINE

## (undated) DEVICE — SYR LUERLOK 5CC

## (undated) DEVICE — SUT ETHIB 0/0 MO6 I8IN CX45D

## (undated) DEVICE — SUT SILK 2/0 TIES 18IN A185H

## (undated) DEVICE — PK MAJ LAPAROTOMY 50

## (undated) DEVICE — SUT SILK 2/0 30IN A305H

## (undated) DEVICE — TP SXN YANKR BULB STRL

## (undated) DEVICE — SPNG LAP PREWSH SFTPK 18X18IN STRL PK/5

## (undated) DEVICE — COVER,MAYO STAND,STERILE: Brand: MEDLINE

## (undated) DEVICE — CVR HNDL LT SURG ACCSSRY BLU STRL

## (undated) DEVICE — BITEBLOCK ENDO W/STRAP 60F A/ LF DISP

## (undated) DEVICE — PK MINOR LAPAROTOMY 50

## (undated) DEVICE — DRAPE SHEET ULTRAGARD: Brand: MEDLINE

## (undated) DEVICE — SOL IRRIG NACL 1000ML

## (undated) DEVICE — PK ENDO GI 50

## (undated) DEVICE — UNDRGLV SURG BIOGEL PIMICROINDICATOR SYNTH SZ7.5PF STRL PR/2